# Patient Record
Sex: FEMALE | Race: WHITE | NOT HISPANIC OR LATINO | Employment: UNEMPLOYED | URBAN - NONMETROPOLITAN AREA
[De-identification: names, ages, dates, MRNs, and addresses within clinical notes are randomized per-mention and may not be internally consistent; named-entity substitution may affect disease eponyms.]

---
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
## 2021-01-12 ENCOUNTER — TRANSFERRED RECORDS (OUTPATIENT)
Dept: HEALTH INFORMATION MANAGEMENT | Facility: OTHER | Age: 21
End: 2021-01-12

## 2021-01-18 ENCOUNTER — MEDICAL CORRESPONDENCE (OUTPATIENT)
Dept: HEALTH INFORMATION MANAGEMENT | Facility: OTHER | Age: 21
End: 2021-01-18

## 2021-01-19 ENCOUNTER — TELEPHONE (OUTPATIENT)
Dept: OBGYN | Facility: OTHER | Age: 21
End: 2021-01-19

## 2021-01-19 DIAGNOSIS — O21.9 NAUSEA AND VOMITING DURING PREGNANCY: Primary | ICD-10-CM

## 2021-01-19 RX ORDER — PROMETHAZINE HYDROCHLORIDE 25 MG/1
25 TABLET ORAL EVERY 6 HOURS PRN
Qty: 30 TABLET | Refills: 0 | Status: SHIPPED | OUTPATIENT
Start: 2021-01-19 | End: 2021-08-11

## 2021-01-19 NOTE — TELEPHONE ENCOUNTER
Patient calls today stating she is pregnant and vomiting several times per day. She did see a provider in [place] who prescribed Unisom. Patient states it makes her too tired and does not help the nausea. Per standing order, patient was prescribed phenergan. She was advised to try this first in the evening as it may also make her tired. Patient will follow up with Intake visit on 1/22/21.    Analy Chan RN...................1/19/2021 3:12 PM

## 2021-01-19 NOTE — TELEPHONE ENCOUNTER
Clotilde is wondering if she can get anything for her morning sickness.  She has her phone intake visit scheduled for this Friday 1/22/21.  Caridad Wells on 1/19/2021 at 11:54 AM

## 2021-01-22 ENCOUNTER — VIRTUAL VISIT (OUTPATIENT)
Dept: OBGYN | Facility: OTHER | Age: 21
End: 2021-01-22
Attending: OBSTETRICS & GYNECOLOGY
Payer: COMMERCIAL

## 2021-01-22 VITALS — BODY MASS INDEX: 38.64 KG/M2 | HEIGHT: 62 IN | WEIGHT: 210 LBS

## 2021-01-22 DIAGNOSIS — O36.80X0 ENCOUNTER TO DETERMINE FETAL VIABILITY OF PREGNANCY, SINGLE OR UNSPECIFIED FETUS: Primary | ICD-10-CM

## 2021-01-22 PROCEDURE — 99207 PR OB VISIT-NO CHARGE - GICH ONLY: CPT | Mod: TEL

## 2021-01-22 ASSESSMENT — MIFFLIN-ST. JEOR: SCORE: 1680.8

## 2021-01-22 ASSESSMENT — PATIENT HEALTH QUESTIONNAIRE - PHQ9: SUM OF ALL RESPONSES TO PHQ QUESTIONS 1-9: 5

## 2021-01-22 ASSESSMENT — PAIN SCALES - GENERAL: PAINLEVEL: NO PAIN (0)

## 2021-01-22 NOTE — NURSING NOTE
"Chief Complaint   Patient presents with     Prenatal Care     Intake       Initial Ht 1.575 m (5' 2\")   Wt 95.3 kg (210 lb)   LMP 12/04/2020   BMI 38.41 kg/m   Estimated body mass index is 38.41 kg/m  as calculated from the following:    Height as of this encounter: 1.575 m (5' 2\").    Weight as of this encounter: 95.3 kg (210 lb).  Medication Reconciliation: complete    Verbal consent received for virtual intake visit.  Total duration of call 20 minutes.    Deb Whitten RN         "

## 2021-01-22 NOTE — PROGRESS NOTES
HPI:    This is a 20 year old female patient,  who was called today for OB Intake visit. Patient reports positive pregnancy test at home.     Obstetrical history and OB Demographics updated to the best of this nurse's ability based on patient report. PHQ-9 depression screening and routine Domestic Abuse screening completed. All immediate questions and concerns answered.    Last menstrual period is reported as Patient's last menstrual period was 2020. JASPREET based on LMP is 09/10/2021.  Her cycles are regular.  Her last menstrual period was normal.   Since her LMP, she has experienced  nausea, emesis, fatigue and headache.       Personal OB history includes: none  Previous OB Provider: leonela  Previous Delivering Clinic: na  Release of Records: na    Current delivery plan: GI  Preferred OB Provider: Claudia Cardenas MD  Current Primary Care Provider: Janki Muñoz  Pediatrician: none    Additional History: 2017 ovarian cyst rupture, MTHFR gene diagnosis     Have you travelled during the pregnancy?No  Have your sexual partner(s) travelled during the pregnancy?No      HISTORY:   Planned Pregnancy: No  Marital Status: Single  Occupation: currently unemployed  Living in Household: Significant Other    Father of the baby is involved.   Family and father of baby is supportive of current pregnancy.  Past Medical History of Father of Baby:No significant medical history    Past History:  Her past medical history History reviewed. No pertinent past medical history..      She has a history of      Since her last LMP she denies use of alcohol, tobacco and street drugs.    Pap smear history: NO - under age 21, PAP not appropriate for age    STD/STI history: No STD history    STD/STI symptoms: no noticeable symptoms     Past medical, surgical, social and family history were reviewed and updated in EPIC.    Medications reviewed by this nurse. Current medication list:  Current Outpatient Medications   Medication Sig  Dispense Refill     promethazine (PHENERGAN) 25 MG tablet Take 1 tablet (25 mg) by mouth every 6 hours as needed for nausea 30 tablet 0     albuterol (PROAIR HFA/PROVENTIL HFA/VENTOLIN HFA) 108 (90 Base) MCG/ACT inhaler Inhale 1-2 puffs into the lungs every 4 hours as needed for shortness of breath / dyspnea, wheezing or other (coughing spasms) (Patient not taking: Reported on 2021) 1 Inhaler 0     The following medications were recommended to be discontinued due to Pregnancy Category D status: none  Patient informed to contact her primary care provider as soon as possible to discuss a safer alternative.    Risk factors:  Moderate and moderately severe risks (consult with OB/Gyn)  Previous fetal or  demise: No  History of  delivery: No  History of heart disease Class I: No  Severe anemia, unresponsive to iron therapy: No  Pelvic mass or neoplasm: No  Previous : No  Hyper/hypothyroidism: No  History of postpartum hemorrhage requiring transfusion:No  History of Placenta Accreta: No    High Risk (Pregnancy managed by OB/Gyn)  Multiple pregnancy: No  Pre-gestational diabetes: No  Chronic Hypertension: No  Renal Failure: No  Heart disease, class II or greater: No  Rh Isoimmunization: No  Chronic active hepatitis: No  Convulsive disorder, poorly controlled: No  Isoimmune thrombocytopenia: No  Pre-term premature rupture of membranes: No  Lupus or other autoimmune disorder: Yes- 2017 diagnosis  Human Immunodeficiency Virus: No      ASSESSMENT/PLAN:     No diagnosis found.    20 year old , 7w0d of pregnancy with JASPREET of 9/10/2021, Alternate JASPREET Entry    Per standing orders and scope of practice of this nurse, patient will have the following orders placed and completed prior to initial OB visit with the appropriate provider:    --early ultrasound for dating and viability ordered for 6+ weeks gestation based on LMP    --Quantitative Beta HCG and progesterone monitoring if  indicated    Counseling given:     - Recommended weight gain for pregnancy: < 15 lbs.   BMI < 18.5  28-40 lbs   18.5 - 24.9 25-35   25 - 29.9 15-25   > 30  < 15       PLAN/PATIENT INSTRUCTIONS:    Normal exercise.  Normal sexual activity.  Prenatal vitamins.  Anticipated weight gain.    follow-up appointment with Dr. Claudia Cardenas MD for pre- care and take multivitamin or pre-keith vitamins    Deb Whitten RN.................................................. 2021 9:11 AM

## 2021-01-22 NOTE — PATIENT INSTRUCTIONS
Normal exercise.  Normal sexual activity.  Prenatal vitamins.  Anticipated weight gain.    follow-up appointment with Dr. Claudia Cardenas MD for pre- care and take multivitamin or pre- vitamins

## 2021-01-29 ENCOUNTER — PRENATAL OFFICE VISIT (OUTPATIENT)
Dept: OBGYN | Facility: OTHER | Age: 21
End: 2021-01-29
Attending: OBSTETRICS & GYNECOLOGY
Payer: COMMERCIAL

## 2021-01-29 ENCOUNTER — HOSPITAL ENCOUNTER (OUTPATIENT)
Dept: ULTRASOUND IMAGING | Facility: OTHER | Age: 21
End: 2021-01-29
Attending: OBSTETRICS & GYNECOLOGY
Payer: COMMERCIAL

## 2021-01-29 VITALS
BODY MASS INDEX: 34.6 KG/M2 | SYSTOLIC BLOOD PRESSURE: 122 MMHG | DIASTOLIC BLOOD PRESSURE: 74 MMHG | HEART RATE: 76 BPM | WEIGHT: 188 LBS | HEIGHT: 62 IN

## 2021-01-29 DIAGNOSIS — O36.80X0 ENCOUNTER TO DETERMINE FETAL VIABILITY OF PREGNANCY, SINGLE OR UNSPECIFIED FETUS: ICD-10-CM

## 2021-01-29 DIAGNOSIS — Z34.01 SUPERVISION OF NORMAL FIRST TEEN PREGNANCY IN FIRST TRIMESTER: Primary | ICD-10-CM

## 2021-01-29 LAB
ABO + RH BLD: NORMAL
ABO + RH BLD: NORMAL
BASOPHILS # BLD AUTO: 0 10E9/L (ref 0–0.2)
BASOPHILS NFR BLD AUTO: 0.4 %
BLD GP AB SCN SERPL QL: NORMAL
BLOOD BANK CMNT PATIENT-IMP: NORMAL
C TRACH DNA SPEC QL NAA+PROBE: NOT DETECTED
DIFFERENTIAL METHOD BLD: ABNORMAL
EOSINOPHIL # BLD AUTO: 0.1 10E9/L (ref 0–0.7)
EOSINOPHIL NFR BLD AUTO: 1 %
ERYTHROCYTE [DISTWIDTH] IN BLOOD BY AUTOMATED COUNT: 15.9 % (ref 10–15)
HCT VFR BLD AUTO: 36.7 % (ref 35–47)
HGB BLD-MCNC: 12 G/DL (ref 11.7–15.7)
IMM GRANULOCYTES # BLD: 0 10E9/L (ref 0–0.4)
IMM GRANULOCYTES NFR BLD: 0.4 %
LYMPHOCYTES # BLD AUTO: 1.2 10E9/L (ref 0.8–5.3)
LYMPHOCYTES NFR BLD AUTO: 11.8 %
MCH RBC QN AUTO: 26.1 PG (ref 26.5–33)
MCHC RBC AUTO-ENTMCNC: 32.7 G/DL (ref 31.5–36.5)
MCV RBC AUTO: 80 FL (ref 78–100)
MISCELLANEOUS TEST: NORMAL
MONOCYTES # BLD AUTO: 0.4 10E9/L (ref 0–1.3)
MONOCYTES NFR BLD AUTO: 4.3 %
N GONORRHOEA DNA SPEC QL NAA+PROBE: NOT DETECTED
NEUTROPHILS # BLD AUTO: 8.1 10E9/L (ref 1.6–8.3)
NEUTROPHILS NFR BLD AUTO: 82.1 %
PLATELET # BLD AUTO: 331 10E9/L (ref 150–450)
RBC # BLD AUTO: 4.6 10E12/L (ref 3.8–5.2)
SPECIMEN EXP DATE BLD: NORMAL
SPECIMEN SOURCE: NORMAL
WBC # BLD AUTO: 9.8 10E9/L (ref 4–11)

## 2021-01-29 PROCEDURE — 81329 SMN1 GENE DOS/DELETION ALYS: CPT | Mod: ZL | Performed by: OBSTETRICS & GYNECOLOGY

## 2021-01-29 PROCEDURE — 86850 RBC ANTIBODY SCREEN: CPT | Mod: ZL | Performed by: OBSTETRICS & GYNECOLOGY

## 2021-01-29 PROCEDURE — 86762 RUBELLA ANTIBODY: CPT | Mod: ZL | Performed by: OBSTETRICS & GYNECOLOGY

## 2021-01-29 PROCEDURE — 86780 TREPONEMA PALLIDUM: CPT | Mod: ZL | Performed by: OBSTETRICS & GYNECOLOGY

## 2021-01-29 PROCEDURE — 86901 BLOOD TYPING SEROLOGIC RH(D): CPT | Mod: ZL | Performed by: OBSTETRICS & GYNECOLOGY

## 2021-01-29 PROCEDURE — 81220 CFTR GENE COM VARIANTS: CPT | Performed by: OBSTETRICS & GYNECOLOGY

## 2021-01-29 PROCEDURE — 87491 CHLMYD TRACH DNA AMP PROBE: CPT | Mod: ZL,XU | Performed by: OBSTETRICS & GYNECOLOGY

## 2021-01-29 PROCEDURE — 87340 HEPATITIS B SURFACE AG IA: CPT | Mod: ZL | Performed by: OBSTETRICS & GYNECOLOGY

## 2021-01-29 PROCEDURE — 84999 UNLISTED CHEMISTRY PROCEDURE: CPT | Mod: ZL | Performed by: OBSTETRICS & GYNECOLOGY

## 2021-01-29 PROCEDURE — 36415 COLL VENOUS BLD VENIPUNCTURE: CPT | Mod: ZL | Performed by: OBSTETRICS & GYNECOLOGY

## 2021-01-29 PROCEDURE — 85025 COMPLETE CBC W/AUTO DIFF WBC: CPT | Mod: ZL | Performed by: OBSTETRICS & GYNECOLOGY

## 2021-01-29 PROCEDURE — 87389 HIV-1 AG W/HIV-1&-2 AB AG IA: CPT | Mod: ZL | Performed by: OBSTETRICS & GYNECOLOGY

## 2021-01-29 PROCEDURE — 86900 BLOOD TYPING SEROLOGIC ABO: CPT | Mod: ZL | Performed by: OBSTETRICS & GYNECOLOGY

## 2021-01-29 PROCEDURE — 87591 N.GONORRHOEAE DNA AMP PROB: CPT | Mod: ZL,XU | Performed by: OBSTETRICS & GYNECOLOGY

## 2021-01-29 PROCEDURE — 99207 PR OB VISIT-NO CHARGE - GICH ONLY: CPT | Performed by: OBSTETRICS & GYNECOLOGY

## 2021-01-29 PROCEDURE — 87086 URINE CULTURE/COLONY COUNT: CPT | Mod: ZL | Performed by: OBSTETRICS & GYNECOLOGY

## 2021-01-29 PROCEDURE — 76801 OB US < 14 WKS SINGLE FETUS: CPT

## 2021-01-29 ASSESSMENT — MIFFLIN-ST. JEOR: SCORE: 1581.01

## 2021-01-29 ASSESSMENT — PAIN SCALES - GENERAL: PAINLEVEL: NO PAIN (0)

## 2021-01-29 NOTE — NURSING NOTE
Chief Complaint   Patient presents with     Prenatal Care     8w0d     Offers no complaints   Huong Davis LPN........................1/29/2021  11:08 AM     Medication Reconciliation: completed   Huong Davis LPN  1/29/2021 11:08 AM

## 2021-01-29 NOTE — PROGRESS NOTES
New Obstetrics Visit    HPI: 20 year old  at 8w0d by LMP c/w 8w0d US here today for initial OB visit. Her LMP was 20. Was having regular monthly menses. This was a unplanned pregnancy, but welcome. Wasn't using contraception. Has mild nausea and occasional vomiting. Mild cramping. No VB. Has hx of depression- stopped bupropion and fluoxetine with +UPT and reports mood is good    OBHx  OB History    Para Term  AB Living   1 0 0 0 0 0   SAB TAB Ectopic Multiple Live Births   0 0 0 0 0      # Outcome Date GA Lbr Jerome/2nd Weight Sex Delivery Anes PTL Lv   1 Current                PMHx:   Past Medical History:   Diagnosis Date     MTHFR gene mutation (H)       PSHx:   Past Surgical History:   Procedure Laterality Date     wisdom teeth        Meds:   Current Outpatient Medications   Medication     promethazine (PHENERGAN) 25 MG tablet     albuterol (PROAIR HFA/PROVENTIL HFA/VENTOLIN HFA) 108 (90 Base) MCG/ACT inhaler     No current facility-administered medications for this visit.      Allergies:     Allergies   Allergen Reactions     Tramadol Other (See Comments)     Ankle swelling and pain       SocHx:   Social History     Tobacco Use     Smoking status: Never Smoker     Smokeless tobacco: Never Used   Substance Use Topics     Alcohol use: Yes     Frequency: Never     Comment: occ     Drug use: Not Currently     Types: Marijuana     Comment: Has not used marijuana during pregnancy     Lives with Alec, together x3 years. Not working or in school.    FamHx:   Family History   Problem Relation Age of Onset     Genetic Disorder Mother         MTHFR mutation     Pulmonary Embolism Mother         2018, no issues during pregnancy     Thyroid Cancer Father      Heart Disease Maternal Grandmother      Diabetes Maternal Grandfather         ROS: 10-Point ROS negative except as noted in HPI      Physical Exam  /74 (BP Location: Right arm, Patient Position: Sitting, Cuff Size: Adult Large)   Pulse 76   " Ht 1.575 m (5' 2\")   Wt 85.3 kg (188 lb)   LMP 2020   Breastfeeding No   BMI 34.39 kg/m    Body mass index is 34.39 kg/m .  Gen: Well-appearing, NAD  HEENT: Normocephalic, atraumatic  Neck: Thyroid is not enlarged, no appreciable masses palpated. Non-tender  CV:  RRR, no m/r/g auscultated  Pulm: CTAB, no w/r/r auscultated  Abd: Soft, non-tender, non-distended  Ext: No LE edema, extremities warm and well perfused    Pelvic:  Normal appearing external female genitalia. No vaginal lesions. Moderate white discharge. Cervix normal, no lesions. Uterus is 9 wk size, mobile, non-tender, anteverted. No adnexal tenderness or masses    Assessment/Plan:  Ms. Clotilde Dorantes is a 20 year old  at 8w0d by LMP c/w 8w0d uS, here for new OB visit. Pregnancy is complicated by MTHFR gene mutation, depression.  1. MTHFR mutation: no changes to folic acid recommendations. Discussed this is controversially associated with VTE- her bigger risk for VTE is her family history, although mother did well during pregnancy. She does not need anticoagulation during pregnancy or postpartum for ppx.  2. Depression: no meds, currently stable. Continue to monitor. Plan restart postpartum or sooner prn  3. New OB labs ord'd  4. Imaging: dating US at 8w0d  5. Genetics: desires- CF and SMA 2021. Plan [place] next visit  6. Immunizations: none    Follow up in 4 weeks.    Claudia Cardenas MD  OB/GYN  2021 11:45 AM     "

## 2021-01-30 LAB
BACTERIA SPEC CULT: NORMAL
SPECIMEN SOURCE: NORMAL
T PALLIDUM AB SER QL: NONREACTIVE

## 2021-01-31 LAB
HBV SURFACE AG SERPL QL IA: NONREACTIVE
HIV 1+2 AB+HIV1 P24 AG SERPL QL IA: NONREACTIVE

## 2021-02-01 LAB — RUBV IGG SERPL IA-ACNC: 74 IU/ML

## 2021-02-09 LAB
CFTR ALLELE 2 BLD/T QL: NEGATIVE
CFTR P.R117H+5T VAR BLD/T QL: NORMAL
CYSTIC FIBROSIS 165 VARIANT INTERP: NORMAL

## 2021-02-15 LAB
RESULT: NORMAL
SEND OUTS MISC TEST CODE: NORMAL
SEND OUTS MISC TEST SPECIMEN: NORMAL
TEST NAME: NORMAL

## 2021-03-02 ENCOUNTER — PRENATAL OFFICE VISIT (OUTPATIENT)
Dept: OBGYN | Facility: OTHER | Age: 21
End: 2021-03-02
Attending: OBSTETRICS & GYNECOLOGY
Payer: COMMERCIAL

## 2021-03-02 VITALS
DIASTOLIC BLOOD PRESSURE: 74 MMHG | WEIGHT: 186 LBS | HEART RATE: 80 BPM | SYSTOLIC BLOOD PRESSURE: 122 MMHG | BODY MASS INDEX: 34.02 KG/M2

## 2021-03-02 DIAGNOSIS — Z34.01 SUPERVISION OF NORMAL FIRST TEEN PREGNANCY IN FIRST TRIMESTER: Primary | ICD-10-CM

## 2021-03-02 PROCEDURE — 99207 PR OB VISIT-NO CHARGE - GICH ONLY: CPT | Performed by: OBSTETRICS & GYNECOLOGY

## 2021-03-02 RX ORDER — PRENATAL VIT/IRON FUM/FOLIC AC 27MG-0.8MG
1 TABLET ORAL DAILY
COMMUNITY
End: 2022-04-27

## 2021-03-02 ASSESSMENT — PAIN SCALES - GENERAL: PAINLEVEL: NO PAIN (0)
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
## 2021-03-11 ENCOUNTER — TELEPHONE (OUTPATIENT)
Dept: OBGYN | Facility: OTHER | Age: 21
End: 2021-03-11

## 2021-03-11 NOTE — TELEPHONE ENCOUNTER
Patient called and informed of normal [facility] results and she wanted to know sex of baby (Male) and requested info to be left with unit 5 check in for FOB. No further questions or concerns at this time.  Fadumo Cardenas RN on 3/11/2021 at 3:25 PM

## 2021-03-30 ENCOUNTER — PRENATAL OFFICE VISIT (OUTPATIENT)
Dept: OBGYN | Facility: OTHER | Age: 21
End: 2021-03-30
Attending: OBSTETRICS & GYNECOLOGY
Payer: COMMERCIAL

## 2021-03-30 VITALS
WEIGHT: 190 LBS | HEART RATE: 80 BPM | DIASTOLIC BLOOD PRESSURE: 60 MMHG | SYSTOLIC BLOOD PRESSURE: 124 MMHG | BODY MASS INDEX: 34.75 KG/M2

## 2021-03-30 DIAGNOSIS — Z34.02 SUPERVISION OF NORMAL FIRST TEEN PREGNANCY IN SECOND TRIMESTER: Primary | ICD-10-CM

## 2021-03-30 PROCEDURE — 99207 PR OB VISIT-NO CHARGE - GICH ONLY: CPT | Performed by: OBSTETRICS & GYNECOLOGY

## 2021-03-30 SDOH — HEALTH STABILITY: MENTAL HEALTH: HOW OFTEN DO YOU HAVE 6 OR MORE DRINKS ON ONE OCCASION?: NOT ASKED

## 2021-03-30 SDOH — HEALTH STABILITY: MENTAL HEALTH: HOW MANY STANDARD DRINKS CONTAINING ALCOHOL DO YOU HAVE ON A TYPICAL DAY?: NOT ASKED

## 2021-03-30 ASSESSMENT — PAIN SCALES - GENERAL: PAINLEVEL: NO PAIN (0)

## 2021-03-30 NOTE — PROGRESS NOTES
Return OB Visit    S: Patient is feeling well. Still tired. Occasional mild cramping. No VB.    O: /60 (BP Location: Right arm, Patient Position: Sitting, Cuff Size: Adult Large)   Pulse 80   Wt 86.2 kg (190 lb)   LMP 2020   Breastfeeding No   BMI 34.75 kg/m    Gen: Well-appearing, NAD  See OB Flowsheet    A/P:  Clotilde Dorantes is a 20 year old  at 16w4d by LMP c/w 8w0d US, here for return OB visit.  MTHFR mutation: does not need anticoagulation during pregnancy or postpartum.   Depression: no meds, currently stable. Plan to restart PP or sooner prn     PNC: Rh positive, Rubella immune  Genetics: normal CF and SMA screen. [employer] 3/2/2021   Imaging: dating US at 8w0d  Immunizations: none  RTC 4 weeks with US    Claudia Cardenas MD  OB/GYN  3/30/2021 9:53 AM

## 2021-03-30 NOTE — NURSING NOTE
Chief Complaint   Patient presents with     Prenatal Care     16w4d     Offers no complaints  Huong Davis LPN........................3/30/2021  9:41 AM     Medication Reconciliation: completed   Huong Davis LPN  3/30/2021 9:41 AM

## 2021-04-15 ENCOUNTER — TELEPHONE (OUTPATIENT)
Dept: EMERGENCY MEDICINE | Facility: OTHER | Age: 21
End: 2021-04-15

## 2021-04-15 ENCOUNTER — HOSPITAL ENCOUNTER (EMERGENCY)
Facility: OTHER | Age: 21
Discharge: HOME | End: 2021-04-15
Attending: EMERGENCY MEDICINE | Admitting: EMERGENCY MEDICINE
Payer: COMMERCIAL

## 2021-04-15 ENCOUNTER — ANCILLARY PROCEDURE (OUTPATIENT)
Dept: ULTRASOUND IMAGING | Facility: OTHER | Age: 21
End: 2021-04-15
Attending: EMERGENCY MEDICINE
Payer: COMMERCIAL

## 2021-04-15 VITALS
TEMPERATURE: 98.2 F | BODY MASS INDEX: 34.96 KG/M2 | DIASTOLIC BLOOD PRESSURE: 68 MMHG | HEART RATE: 78 BPM | SYSTOLIC BLOOD PRESSURE: 111 MMHG | RESPIRATION RATE: 20 BRPM | WEIGHT: 190 LBS | OXYGEN SATURATION: 98 % | HEIGHT: 62 IN

## 2021-04-15 DIAGNOSIS — R10.9 ABDOMINAL CRAMPING AFFECTING PREGNANCY: ICD-10-CM

## 2021-04-15 DIAGNOSIS — O26.899 ABDOMINAL CRAMPING AFFECTING PREGNANCY: ICD-10-CM

## 2021-04-15 DIAGNOSIS — O23.599 BACTERIAL VAGINOSIS IN PREGNANCY: Primary | ICD-10-CM

## 2021-04-15 DIAGNOSIS — B96.89 BACTERIAL VAGINOSIS IN PREGNANCY: Primary | ICD-10-CM

## 2021-04-15 DIAGNOSIS — B37.31 CANDIDIASIS OF VAGINA DURING PREGNANCY: ICD-10-CM

## 2021-04-15 DIAGNOSIS — O98.819 CANDIDIASIS OF VAGINA DURING PREGNANCY: ICD-10-CM

## 2021-04-15 LAB
ALBUMIN UR-MCNC: NEGATIVE MG/DL
APPEARANCE UR: ABNORMAL
BACTERIA #/AREA URNS HPF: ABNORMAL /HPF
BILIRUB UR QL STRIP: NEGATIVE
COLOR UR AUTO: ABNORMAL
GLUCOSE UR STRIP-MCNC: NEGATIVE MG/DL
HGB UR QL STRIP: NEGATIVE
KETONES UR STRIP-MCNC: NEGATIVE MG/DL
LEUKOCYTE ESTERASE UR QL STRIP: ABNORMAL
NITRATE UR QL: NEGATIVE
PH UR STRIP: 7.5 PH (ref 5–7)
RBC #/AREA URNS AUTO: 2 /HPF (ref 0–2)
SOURCE: ABNORMAL
SP GR UR STRIP: 1.01 (ref 1–1.03)
SQUAMOUS #/AREA URNS AUTO: 14 /HPF (ref 0–1)
UROBILINOGEN UR STRIP-MCNC: NORMAL MG/DL (ref 0–2)
WBC #/AREA URNS AUTO: 2 /HPF (ref 0–5)

## 2021-04-15 PROCEDURE — 99282 EMERGENCY DEPT VISIT SF MDM: CPT | Performed by: EMERGENCY MEDICINE

## 2021-04-15 PROCEDURE — 76815 OB US LIMITED FETUS(S): CPT | Mod: TC | Performed by: EMERGENCY MEDICINE

## 2021-04-15 PROCEDURE — 99282 EMERGENCY DEPT VISIT SF MDM: CPT | Mod: 25 | Performed by: EMERGENCY MEDICINE

## 2021-04-15 PROCEDURE — 99283 EMERGENCY DEPT VISIT LOW MDM: CPT | Performed by: EMERGENCY MEDICINE

## 2021-04-15 PROCEDURE — 76815 OB US LIMITED FETUS(S): CPT | Mod: 26 | Performed by: EMERGENCY MEDICINE

## 2021-04-15 PROCEDURE — 87086 URINE CULTURE/COLONY COUNT: CPT | Mod: ZL | Performed by: EMERGENCY MEDICINE

## 2021-04-15 PROCEDURE — 81001 URINALYSIS AUTO W/SCOPE: CPT | Mod: ZL | Performed by: EMERGENCY MEDICINE

## 2021-04-15 ASSESSMENT — MIFFLIN-ST. JEOR: SCORE: 1590.08

## 2021-04-15 NOTE — ED PROVIDER NOTES
"Clotilde Dorantes  : 2000 Age: 20 year old Sex: female MRN: [id number]    CC: Abd pain in pregnancy    HPI: Clotilde Dorantes is a 20 year old  at 18w6d by 8w0d US who presents with cramping abdominal pain. She states that she has intermittently had cramping abdominal pain throughout the pregnancy, but since yesterday evening the pain has been worse than it was previously. She endorses cramping feelings every few hours which are sufficient to wake her up. No gush of fluid or vaginal bleeding. No Fevers. No N/V/D. No dysuria/freq/urg.     ED Course and MDM:  Abd pain in preg: [place] leach vs UTI vs round ligament. Bedside US demonstrated normal fetal movement and heartrate.  I initially neglected to obtain a urinalysis, but called the patient and placed an order with a lab for urinalysis.  Her urinalysis to me appears to be a contaminated specimen.  Rather than treating her for potential asymptomatic bacteriuria of pregnancy, I would like to wait on the culture results for this.  With a reassuring ultrasound, patient was discharged home in stable condition with return precautions and follow-up with her obstetrician    Final Clinical Impression:  Abdominal pain in pregnancy    Amor Menard MD  Internal Medicine and Emergency Medicine  9:07 AM 04/15/21      Physical Exam:  /76   Pulse 84   Temp 98.2  F (36.8  C) (Tympanic)   Resp 20   Ht 1.575 m (5' 2\")   Wt 86.2 kg (190 lb)   LMP 2020   SpO2 98%   BMI 34.75 kg/m      Abd Soft, mildly tender throughout without rebound or guarding    ROS:  Focused ROS performed and noted in HPI           Amor Menard MD  04/15/21 1705    "

## 2021-04-15 NOTE — ED TRIAGE NOTES
ED Nursing Triage Note (General)   ________________________________    Clotilde JUSTIN Dorantes is a 20 year old Female that presents to triage private car with a chief complaint of abdominal cramping. It started last night and worsen throughout the night to the point of waking her up. Pt will be 19 weeks pregnant tomorrow. Pt says cramps get up to 8/10 when they present. Pt denies any bleeding. Pt denies feeling nauseated.     LMP 12/04/2020 t  Patient appears alert  and oriented, in no acute distress., and cooperative, pleasant and calm behavior.  GCS Total = 15  Airway: intact  Breathing noted as Normal.  Circulation Normal  Skin normal  Action taken:  Triage to critical care immediately      PRE HOSPITAL PRIOR LIVING SITUATION Significant Other

## 2021-04-15 NOTE — DISCHARGE INSTRUCTIONS
You were seen today with cramping abdominal pain in pregnancy.  At this time there are no signs of urine premature labor.  The baby looks good on ultrasound.  If you have any new symptoms that are concerning you, please come back to be seen.  Please follow-up with your obstetrician at your scheduled appointment.  Please take Tylenol for your pain and stay well-hydrated

## 2021-04-16 LAB
BACTERIA SPEC CULT: NORMAL
SPECIMEN SOURCE: NORMAL

## 2021-04-26 NOTE — PROGRESS NOTES
Return OB Visit    S: Ms. Dorantes is feeling well today. She has no acute concerns. Denies leaking of fluid, vaginal bleeding, painful contractions. Notes fetal movements. She had her anatomy US today    O:  /68   Pulse 104   Wt 89.9 kg (198 lb 4.8 oz)   LMP 2020   BMI 36.27 kg/m      Gen: Well-appearing, NAD  See OB Flowsheet    FH 20 cm   bpm    Assessment:  Ms. Clotilde Dorantes is a 20 year old yo  here for an OB follow up visit. She is currently 20w4d. This pregnancy is complicated by MTHFR mutation and depression.    Plan:  # Routine Prenatal Care  -- Dating: LMP= 8w US JASPREET: 9/10/2021  -- PNLs:    A+, Ab screen neg   RPR nr   Hep B S Ag neg   Rubella Immune   HIV neg   GC/Chlam neg    -- Genetic Screening: [place] low risk: male, SMA low risk, CF low risk  -- Anatomy US: performed today, will follow up results  -- Immunizations: Plans for Tdap at approximately 27 weeks gestation  -- 3rd TM labs including CBC, RPR: Planned for after 28 weeks gestation  -- 1 hr GTT: next visit  -- GBS: Planned for 36 weeks  -- Postpartum Planning: To be discussed   -- Delivery Planning: No indication for early IOL at this time. To be discussed continually  -- Return to clinic in 4 weeks for OB follow up visit  -- Planning to do at next visit: 1 hr GTT    # MTHFR mutation  -- no anticoagulation needed in pregnancy or postpartum period    # Depression  -- currently stable  -- plan for resuming medications postpartum

## 2021-04-27 ENCOUNTER — HOSPITAL ENCOUNTER (OUTPATIENT)
Dept: ULTRASOUND IMAGING | Facility: OTHER | Age: 21
End: 2021-04-27
Attending: OBSTETRICS & GYNECOLOGY
Payer: COMMERCIAL

## 2021-04-27 ENCOUNTER — PRENATAL OFFICE VISIT (OUTPATIENT)
Dept: OBGYN | Facility: OTHER | Age: 21
End: 2021-04-27
Attending: STUDENT IN AN ORGANIZED HEALTH CARE EDUCATION/TRAINING PROGRAM
Payer: COMMERCIAL

## 2021-04-27 VITALS
SYSTOLIC BLOOD PRESSURE: 102 MMHG | BODY MASS INDEX: 36.27 KG/M2 | HEART RATE: 104 BPM | WEIGHT: 198.3 LBS | DIASTOLIC BLOOD PRESSURE: 68 MMHG

## 2021-04-27 DIAGNOSIS — Z15.89 MTHFR MUTATION: ICD-10-CM

## 2021-04-27 DIAGNOSIS — Z34.02 SUPERVISION OF NORMAL FIRST TEEN PREGNANCY IN SECOND TRIMESTER: Primary | ICD-10-CM

## 2021-04-27 DIAGNOSIS — Z34.02 SUPERVISION OF NORMAL FIRST TEEN PREGNANCY IN SECOND TRIMESTER: ICD-10-CM

## 2021-04-27 PROCEDURE — 76805 OB US >/= 14 WKS SNGL FETUS: CPT

## 2021-04-27 PROCEDURE — 99207 PR OB VISIT-NO CHARGE - GICH ONLY: CPT | Performed by: STUDENT IN AN ORGANIZED HEALTH CARE EDUCATION/TRAINING PROGRAM

## 2021-04-27 ASSESSMENT — PAIN SCALES - GENERAL: PAINLEVEL: NO PAIN (0)

## 2021-04-27 NOTE — NURSING NOTE
Pt presents to clinic today for prenatal care 20w4d.Pt denies any contractions, bleeding, or leakage of fluid at this time.      Medication Reconciliation: complete  Alayna Crump LPN

## 2021-05-11 ENCOUNTER — HOSPITAL ENCOUNTER (OUTPATIENT)
Facility: OTHER | Age: 21
End: 2021-05-11
Admitting: STUDENT IN AN ORGANIZED HEALTH CARE EDUCATION/TRAINING PROGRAM
Payer: COMMERCIAL

## 2021-05-11 ENCOUNTER — NURSE TRIAGE (OUTPATIENT)
Dept: NURSING | Facility: CLINIC | Age: 21
End: 2021-05-11

## 2021-05-11 ENCOUNTER — HOSPITAL ENCOUNTER (OUTPATIENT)
Facility: OTHER | Age: 21
Discharge: HOME | End: 2021-05-11
Attending: STUDENT IN AN ORGANIZED HEALTH CARE EDUCATION/TRAINING PROGRAM | Admitting: STUDENT IN AN ORGANIZED HEALTH CARE EDUCATION/TRAINING PROGRAM
Payer: COMMERCIAL

## 2021-05-11 VITALS
OXYGEN SATURATION: 96 % | RESPIRATION RATE: 18 BRPM | TEMPERATURE: 98.6 F | SYSTOLIC BLOOD PRESSURE: 127 MMHG | HEART RATE: 88 BPM | DIASTOLIC BLOOD PRESSURE: 68 MMHG

## 2021-05-11 DIAGNOSIS — Z34.02 SUPERVISION OF NORMAL FIRST TEEN PREGNANCY IN SECOND TRIMESTER: Primary | ICD-10-CM

## 2021-05-11 PROBLEM — Z36.89 ENCOUNTER FOR TRIAGE IN PREGNANT PATIENT: Status: ACTIVE | Noted: 2021-05-11

## 2021-05-11 LAB
ALBUMIN UR-MCNC: NEGATIVE MG/DL
AMORPH CRY #/AREA URNS HPF: ABNORMAL /HPF
APPEARANCE UR: CLEAR
BACTERIA #/AREA URNS HPF: ABNORMAL /HPF
BILIRUB UR QL STRIP: NEGATIVE
COLOR UR AUTO: ABNORMAL
GLUCOSE UR STRIP-MCNC: NEGATIVE MG/DL
HGB UR QL STRIP: NEGATIVE
KETONES UR STRIP-MCNC: NEGATIVE MG/DL
LEUKOCYTE ESTERASE UR QL STRIP: ABNORMAL
NITRATE UR QL: NEGATIVE
PH UR STRIP: 7 PH (ref 5–7)
RBC #/AREA URNS AUTO: 1 /HPF (ref 0–2)
SOURCE: ABNORMAL
SP GR UR STRIP: 1.01 (ref 1–1.03)
SPECIMEN SOURCE: ABNORMAL
SQUAMOUS #/AREA URNS AUTO: 5 /HPF (ref 0–1)
UROBILINOGEN UR STRIP-MCNC: NORMAL MG/DL (ref 0–2)
WBC #/AREA URNS AUTO: 6 /HPF (ref 0–5)
WET PREP SPEC: ABNORMAL

## 2021-05-11 PROCEDURE — G0463 HOSPITAL OUTPT CLINIC VISIT: HCPCS | Mod: 25

## 2021-05-11 PROCEDURE — 81001 URINALYSIS AUTO W/SCOPE: CPT | Performed by: STUDENT IN AN ORGANIZED HEALTH CARE EDUCATION/TRAINING PROGRAM

## 2021-05-11 PROCEDURE — 87210 SMEAR WET MOUNT SALINE/INK: CPT | Performed by: STUDENT IN AN ORGANIZED HEALTH CARE EDUCATION/TRAINING PROGRAM

## 2021-05-11 PROCEDURE — 250N000013 HC RX MED GY IP 250 OP 250 PS 637: Performed by: STUDENT IN AN ORGANIZED HEALTH CARE EDUCATION/TRAINING PROGRAM

## 2021-05-11 RX ORDER — METRONIDAZOLE 500 MG/1
500 TABLET ORAL 2 TIMES DAILY
Qty: 14 TABLET | Refills: 0 | Status: SHIPPED | OUTPATIENT
Start: 2021-05-11 | End: 2021-05-18

## 2021-05-11 RX ORDER — ACETAMINOPHEN 325 MG/1
975 TABLET ORAL ONCE
Status: COMPLETED | OUTPATIENT
Start: 2021-05-11 | End: 2021-05-11

## 2021-05-11 RX ORDER — CLOTRIMAZOLE 1 %
CREAM (GRAM) TOPICAL DAILY
Qty: 15 G | Refills: 0 | Status: SHIPPED | OUTPATIENT
Start: 2021-05-11 | End: 2021-05-18

## 2021-05-11 RX ORDER — CLOTRIMAZOLE 1 %
CREAM (GRAM) TOPICAL DAILY
Qty: 15 G | Refills: 0 | Status: SHIPPED | OUTPATIENT
Start: 2021-05-11 | End: 2021-06-24

## 2021-05-11 RX ADMIN — ACETAMINOPHEN 975 MG: 325 TABLET, FILM COATED ORAL at 20:14

## 2021-05-11 NOTE — TELEPHONE ENCOUNTER
Patient calling reporting she is 22w4d pregnant. Reports she has been having contractions. Denies feeling like she is going to pass out. RN artemio transferred patient to [facility]  to be triaged since FNA do not triage OB patient for [facility].     Mandeep Bay RN  [facility]

## 2021-05-12 NOTE — PROGRESS NOTES
Lab results called to MD. Patient will be treated for BV and yeast infection. Medications sent to [facility] pharmacy and patient will  tonight to start treatment. Discharged to home with instructions to follow up as previously scheduled in clinic. No further questions or concerns.

## 2021-05-12 NOTE — PROGRESS NOTES
Patient arrived from home with c/o cramping that started yesterday, reports baby has been active, denies vaginal bleeding or leaking of fluid of discharge. , 29 4/7 weeks. No recent intercourse. Wet prep and UA sent to lab. SVE: 0 cm, 0%, -2. FHR: 145 bpm, no contractions tracing or palpated. MD aware of patient, orders placed.

## 2021-05-26 ENCOUNTER — TELEPHONE (OUTPATIENT)
Dept: OBGYN | Facility: OTHER | Age: 21
End: 2021-05-26

## 2021-05-26 NOTE — PROGRESS NOTES
Return OB Visit    S: Ms. Dorantes is feeling well today. She has no acute concerns. Denies leaking of fluid, vaginal bleeding, painful contractions. Notes fetal movements.    O:   /80   Pulse 108   Wt 93.9 kg (207 lb)   LMP 2020   BMI 37.86 kg/m      Gen: Well-appearing, NAD  See OB Flowsheet    FH 25 cm   bpm    Assessment:  Ms. Clotilde Dorantes is a 20 year old yo  here for an OB follow up visit. She is currently 24w6d. This pregnancy is complicated by MTHFR mutation and depression.     Plan:  # Routine Prenatal Care  -- Dating: LMP= 8w US JASPREET: 9/10/2021  -- PNLs:               A+, Ab screen neg              RPR nr              Hep B S Ag neg              Rubella Immune              HIV neg              GC/Chlam neg     -- Genetic Screening: [place] low risk: male, SMA low risk, CF low risk  -- Anatomy US: : 50%ile, unable to see profile and RVOT   Follow up US ordered today  -- Immunizations: Plans for Tdap at next visit  -- 3rd TM labs including CBC, RPR: Planned for next visit  -- 1 hr GTT: today  -- GBS: Planned for 36 weeks  -- Postpartum Planning: To be discussed   -- Delivery Planning: No indication for early IOL at this time. To be discussed continually  -- Return to clinic in 4 weeks for OB follow up visit  -- Planning to do at next visit: CBC, RPR, Tdap     # MTHFR mutation  -- no anticoagulation needed in pregnancy or postpartum period     # Depression  -- currently stable  -- plan for resuming medications postpartum    JOSE JASSO MD on 2021 at 11:39 AM

## 2021-05-26 NOTE — TELEPHONE ENCOUNTER
Call from patient stating she is having some cramping lower abdomen, no bleeding or leaking fluid, no fever, baby is active as usual, no known urinary issues, no headache and swelling of the hands/feet. Advised patient to increase her fluid intake, take some tylenol and reviewed round ligament pain. Patient has appointment with Dr. Gutierrez tomorrow and does not request to be seen today. Advised her to call back if anything changes yet today. Patient voiced understanding and has no further questions or concerns at this time.   Fadumo Cardenas RN on 5/26/2021 at 11:45 AM

## 2021-05-27 ENCOUNTER — PRENATAL OFFICE VISIT (OUTPATIENT)
Dept: OBGYN | Facility: OTHER | Age: 21
End: 2021-05-27
Attending: STUDENT IN AN ORGANIZED HEALTH CARE EDUCATION/TRAINING PROGRAM
Payer: COMMERCIAL

## 2021-05-27 ENCOUNTER — HOSPITAL ENCOUNTER (OUTPATIENT)
Facility: OTHER | Age: 21
End: 2021-05-27
Admitting: STUDENT IN AN ORGANIZED HEALTH CARE EDUCATION/TRAINING PROGRAM
Payer: COMMERCIAL

## 2021-05-27 ENCOUNTER — HOSPITAL ENCOUNTER (OUTPATIENT)
Facility: OTHER | Age: 21
Discharge: HOME | End: 2021-05-27
Attending: STUDENT IN AN ORGANIZED HEALTH CARE EDUCATION/TRAINING PROGRAM | Admitting: STUDENT IN AN ORGANIZED HEALTH CARE EDUCATION/TRAINING PROGRAM
Payer: COMMERCIAL

## 2021-05-27 ENCOUNTER — NURSE TRIAGE (OUTPATIENT)
Dept: NURSING | Facility: CLINIC | Age: 21
End: 2021-05-27

## 2021-05-27 VITALS
TEMPERATURE: 98.2 F | HEART RATE: 82 BPM | RESPIRATION RATE: 18 BRPM | DIASTOLIC BLOOD PRESSURE: 60 MMHG | SYSTOLIC BLOOD PRESSURE: 131 MMHG

## 2021-05-27 VITALS
SYSTOLIC BLOOD PRESSURE: 124 MMHG | HEART RATE: 108 BPM | WEIGHT: 207 LBS | DIASTOLIC BLOOD PRESSURE: 80 MMHG | BODY MASS INDEX: 37.86 KG/M2

## 2021-05-27 DIAGNOSIS — Z34.02 SUPERVISION OF NORMAL FIRST TEEN PREGNANCY IN SECOND TRIMESTER: Primary | ICD-10-CM

## 2021-05-27 DIAGNOSIS — Z15.89 MTHFR MUTATION: ICD-10-CM

## 2021-05-27 LAB
ALBUMIN UR-MCNC: NEGATIVE MG/DL
APPEARANCE UR: ABNORMAL
BACTERIA #/AREA URNS HPF: ABNORMAL /HPF
BILIRUB UR QL STRIP: NEGATIVE
COLOR UR AUTO: ABNORMAL
GLUCOSE 1H P 50 G GLC PO SERPL-MCNC: 132 MG/DL (ref 60–129)
GLUCOSE UR STRIP-MCNC: NEGATIVE MG/DL
HGB UR QL STRIP: NEGATIVE
KETONES UR STRIP-MCNC: NEGATIVE MG/DL
LEUKOCYTE ESTERASE UR QL STRIP: ABNORMAL
MUCOUS THREADS #/AREA URNS LPF: PRESENT /LPF
NITRATE UR QL: NEGATIVE
PH UR STRIP: 7.5 PH (ref 5–7)
RBC #/AREA URNS AUTO: 0 /HPF (ref 0–2)
SOURCE: ABNORMAL
SP GR UR STRIP: 1.02 (ref 1–1.03)
SQUAMOUS #/AREA URNS AUTO: 2 /HPF (ref 0–1)
UROBILINOGEN UR STRIP-MCNC: NORMAL MG/DL (ref 0–2)
WBC #/AREA URNS AUTO: 2 /HPF (ref 0–5)

## 2021-05-27 PROCEDURE — 81001 URINALYSIS AUTO W/SCOPE: CPT | Performed by: STUDENT IN AN ORGANIZED HEALTH CARE EDUCATION/TRAINING PROGRAM

## 2021-05-27 PROCEDURE — 82950 GLUCOSE TEST: CPT | Mod: ZL | Performed by: STUDENT IN AN ORGANIZED HEALTH CARE EDUCATION/TRAINING PROGRAM

## 2021-05-27 PROCEDURE — 87086 URINE CULTURE/COLONY COUNT: CPT | Performed by: STUDENT IN AN ORGANIZED HEALTH CARE EDUCATION/TRAINING PROGRAM

## 2021-05-27 PROCEDURE — 36415 COLL VENOUS BLD VENIPUNCTURE: CPT | Mod: ZL | Performed by: STUDENT IN AN ORGANIZED HEALTH CARE EDUCATION/TRAINING PROGRAM

## 2021-05-27 PROCEDURE — 99207 PR OB VISIT-NO CHARGE - GICH ONLY: CPT | Performed by: STUDENT IN AN ORGANIZED HEALTH CARE EDUCATION/TRAINING PROGRAM

## 2021-05-27 PROCEDURE — G0463 HOSPITAL OUTPT CLINIC VISIT: HCPCS | Mod: 25

## 2021-05-27 ASSESSMENT — PAIN SCALES - GENERAL: PAINLEVEL: NO PAIN (0)

## 2021-05-27 NOTE — NURSING NOTE
Pt presents to clinic today for prenatal care 24w6d. Pt denies any contractions, bleeding, or leakage of fluid at this time. Pt states baby is moving a lot.      Medication Reconciliation: complete  Alayna Crump LPN

## 2021-05-28 NOTE — TELEPHONE ENCOUNTER
Advised will transfer her to switchboard for L&D related questions/concerns. Patient verbalized understanding.    Reason for Disposition    [1] Caller requesting NON-URGENT health information AND [2] PCP's office is the best resource    Additional Information    Negative: RN needs further essential information from caller in order to complete triage    Negative: Requesting regular office appointment    Protocols used: INFORMATION ONLY CALL - NO TRIAGE-A-

## 2021-05-28 NOTE — PROGRESS NOTES
Dr. JUSTIN Gutierrez notified of pt arrival and status. Pt's pain seems to be resolving with time, now rating 5/10. Pt appears comfortable in bed. Urine results indeterminate of infection, will reflex to culture, pt denies any UTI symptoms at this time. FHT's 140's. Pt recommended to use tylenol, gas x and tums PRN. Okay to discharge home.

## 2021-05-28 NOTE — PROGRESS NOTES
Pt discharged home at 2243. AVS signed. Discharge instructions reviewed with pt as well as instructions when it would be appropriate to return to hospital.

## 2021-05-28 NOTE — PROGRESS NOTES
Pt arrived to unit at 2148 after calling [facility] with complaint of abdominal pain rating 10/10. Roomed to 404. Denies vaginal bleeding or LOF. Pain is constant at this time. Abdomen palpated soft.. [place] last this AM. Urine collected. EFM applied. VSS.

## 2021-05-29 LAB
BACTERIA SPEC CULT: NORMAL
SPECIMEN SOURCE: NORMAL

## 2021-06-23 NOTE — PROGRESS NOTES
Return OB Visit    S: Ms. Dorantes is feeling well today. She has no acute concerns. Denies leaking of fluid, vaginal bleeding, painful contractions. Notes fetal movements.    O:   /82   Pulse 92   Wt 96.3 kg (212 lb 4.8 oz)   LMP 2020   BMI 38.83 kg/m      Gen: Well-appearing, NAD  Growth US today:  bpm    Assessment:  Ms. Clotilde Dorantes is a 20 year old yo  here for an OB follow up visit. She is currently 28w6d. This pregnancy is complicated by MTHFR mutation and depression.     Plan:  # Routine Prenatal Care  -- Dating: LMP= 8w US JASPREET: 9/10/2021  -- PNLs:               A+, Ab screen neg              RPR nr              Hep B S Ag neg              Rubella Immune              HIV neg              GC/Chlam neg     -- Genetic Screening: [place] low risk: male, SMA low risk, CF low risk  -- Anatomy US: : 50%ile, unable to see profile and RVOT              Follow up US today: pending  -- Immunizations: Tdap declined  -- 3rd TM labs including CBC, RPR: today  -- 1 hr GTT: 132  -- GBS: Planned for 36 weeks  -- Postpartum Planning: To be discussed   -- Delivery Planning: No indication for early IOL at this time. To be discussed continually  -- Return to clinic in 4 weeks for OB follow up visit  -- Planning to do at next visit: follow up US, CBC and RPR results     # MTHFR mutation  -- no anticoagulation needed in pregnancy or postpartum period     # Depression  -- currently stable  -- plan for resuming medications postpartum

## 2021-06-24 ENCOUNTER — HOSPITAL ENCOUNTER (OUTPATIENT)
Dept: ULTRASOUND IMAGING | Facility: OTHER | Age: 21
End: 2021-06-24
Attending: STUDENT IN AN ORGANIZED HEALTH CARE EDUCATION/TRAINING PROGRAM
Payer: COMMERCIAL

## 2021-06-24 ENCOUNTER — PRENATAL OFFICE VISIT (OUTPATIENT)
Dept: OBGYN | Facility: OTHER | Age: 21
End: 2021-06-24
Attending: STUDENT IN AN ORGANIZED HEALTH CARE EDUCATION/TRAINING PROGRAM
Payer: COMMERCIAL

## 2021-06-24 VITALS
BODY MASS INDEX: 38.83 KG/M2 | WEIGHT: 212.3 LBS | HEART RATE: 92 BPM | SYSTOLIC BLOOD PRESSURE: 124 MMHG | DIASTOLIC BLOOD PRESSURE: 82 MMHG

## 2021-06-24 DIAGNOSIS — Z34.92 SECOND TRIMESTER PREGNANCY: Primary | ICD-10-CM

## 2021-06-24 DIAGNOSIS — O99.012 ANEMIA DURING PREGNANCY IN SECOND TRIMESTER: Primary | ICD-10-CM

## 2021-06-24 DIAGNOSIS — Z34.02 SUPERVISION OF NORMAL FIRST TEEN PREGNANCY IN SECOND TRIMESTER: ICD-10-CM

## 2021-06-24 DIAGNOSIS — Z15.89 MTHFR MUTATION: ICD-10-CM

## 2021-06-24 LAB
ERYTHROCYTE [DISTWIDTH] IN BLOOD BY AUTOMATED COUNT: 13.5 % (ref 10–15)
HCT VFR BLD AUTO: 30.5 % (ref 35–47)
HGB BLD-MCNC: 10.2 G/DL (ref 11.7–15.7)
MCH RBC QN AUTO: 27.1 PG (ref 26.5–33)
MCHC RBC AUTO-ENTMCNC: 33.4 G/DL (ref 31.5–36.5)
MCV RBC AUTO: 81 FL (ref 78–100)
PLATELET # BLD AUTO: 288 10E9/L (ref 150–450)
RBC # BLD AUTO: 3.77 10E12/L (ref 3.8–5.2)
WBC # BLD AUTO: 13.7 10E9/L (ref 4–11)

## 2021-06-24 PROCEDURE — 76816 OB US FOLLOW-UP PER FETUS: CPT

## 2021-06-24 PROCEDURE — 99207 PR OB VISIT-NO CHARGE - GICH ONLY: CPT | Performed by: STUDENT IN AN ORGANIZED HEALTH CARE EDUCATION/TRAINING PROGRAM

## 2021-06-24 PROCEDURE — 85027 COMPLETE CBC AUTOMATED: CPT | Mod: ZL | Performed by: STUDENT IN AN ORGANIZED HEALTH CARE EDUCATION/TRAINING PROGRAM

## 2021-06-24 PROCEDURE — 86780 TREPONEMA PALLIDUM: CPT | Mod: ZL | Performed by: STUDENT IN AN ORGANIZED HEALTH CARE EDUCATION/TRAINING PROGRAM

## 2021-06-24 PROCEDURE — 36415 COLL VENOUS BLD VENIPUNCTURE: CPT | Mod: ZL | Performed by: STUDENT IN AN ORGANIZED HEALTH CARE EDUCATION/TRAINING PROGRAM

## 2021-06-24 RX ORDER — FERROUS SULFATE 325(65) MG
325 TABLET ORAL
Qty: 60 TABLET | Refills: 3 | Status: SHIPPED | OUTPATIENT
Start: 2021-06-24 | End: 2022-04-27

## 2021-06-24 NOTE — NURSING NOTE
Pt presents to clinic today for prenatal care 28w6d. Pt denies any contractions, bleeding, or leakage of fluid at this time.        Medication Reconciliation: complete  Alayna Crump LPN

## 2021-06-25 ENCOUNTER — OFFICE VISIT (OUTPATIENT)
Dept: FAMILY MEDICINE | Facility: OTHER | Age: 21
End: 2021-06-25
Attending: NURSE PRACTITIONER
Payer: COMMERCIAL

## 2021-06-25 VITALS
DIASTOLIC BLOOD PRESSURE: 80 MMHG | SYSTOLIC BLOOD PRESSURE: 130 MMHG | HEART RATE: 104 BPM | OXYGEN SATURATION: 97 % | TEMPERATURE: 98.2 F

## 2021-06-25 DIAGNOSIS — J32.0 CHRONIC MAXILLARY SINUSITIS: Primary | ICD-10-CM

## 2021-06-25 LAB — T PALLIDUM AB SER QL: NONREACTIVE

## 2021-06-25 PROCEDURE — 99213 OFFICE O/P EST LOW 20 MIN: CPT | Performed by: PHYSICIAN ASSISTANT

## 2021-06-25 PROCEDURE — G0463 HOSPITAL OUTPT CLINIC VISIT: HCPCS | Mod: 25

## 2021-06-25 RX ORDER — AMOXICILLIN 875 MG
875 TABLET ORAL 2 TIMES DAILY
Qty: 14 TABLET | Refills: 0 | Status: SHIPPED | OUTPATIENT
Start: 2021-06-25 | End: 2021-07-02

## 2021-06-25 ASSESSMENT — PAIN SCALES - GENERAL: PAINLEVEL: MODERATE PAIN (4)

## 2021-06-25 NOTE — NURSING NOTE
"Patient presents to the clinic for nasal congestion 2 days ago, runny nose with yellow/green drainage and reported wheezing today.      Chief Complaint   Patient presents with     Sinus Problem       Initial /80 (BP Location: Left arm, Patient Position: Sitting, Cuff Size: Adult Regular)   Pulse 104   Temp 98.2  F (36.8  C) (Tympanic)   LMP 12/04/2020   SpO2 97%  Estimated body mass index is 38.83 kg/m  as calculated from the following:    Height as of 4/15/21: 1.575 m (5' 2\").    Weight as of 6/24/21: 96.3 kg (212 lb 4.8 oz).  Medication Reconciliation: complete    Kassy Carrillo LPN    "

## 2021-06-25 NOTE — PROGRESS NOTES
ASSESSMENT/PLAN:    I have reviewed the nursing notes.  I have reviewed the findings, diagnosis, plan and need for follow up with the patient.    (J32.0) Chronic maxillary sinusitis  (primary encounter diagnosis)  Comment: See below  Plan: amoxicillin (AMOXIL) 875 MG tablet        Vital signs stable. PE consistent with sinusitis. Discussed with patient that we recommend: use a saline spray/Neti Pot/sinus flush (Clemente Med Sinus Rinse) 2-3 times daily to irrigate sinuses/mucosal tissue. This dilutes and moves secretions. Alternate Tylenol or ibuprofen for pain and fevers - alternate every 4 hours as needed. Recommend plenty of fluids and rest as needed. Discussed that if a smoker, tobacco cessation recommended. Discussed that we normally do not treat sinus infections of less than 10 days duration with oral antibiotics as these are typically viral in nature, however due to pregnancy will treat patient with amoxicillin p.o. twice daily for 7 days discussed that if an antibiotic was prescribed today for bacterial sinusitis to take the entire course of antibiotic, discussed side effect profile of prescribed medications. Patient is in agreement and understanding of the above treatment plan. All questions and concerns were addressed and answered to patient's satisfaction. AVS reviewed with patient.     Discussed warning signs/symptoms indicative of need to f/u    Follow up if symptoms persist or worsen or concerns    I explained my diagnostic considerations and recommendations to the patient, who voiced understanding and agreement with the treatment plan. All questions were answered. We discussed potential side effects of any prescribed or recommended therapies, as well as expectations for response to treatments.    Lori Mckeon PA-C  6/25/2021  3:56 PM    HPI:    Clotilde Dorantes is a 20 year old female  who presents to [facility] today for concerns of sinusitis, patient 29 weeks pregnant.     Symptoms present x  nasal congestion 2 days ago, runny nose with yellow/green drainage and reported wheezing today.    Symptoms:   Location: bifrontal  Nasal congestion: Yes  Purulent nasal discharge: Yes  Headache: Yes: sinus HA  Facial pain: Yes: from sinuses   Cough: Yes: post nasal drip  Tooth pain/symptoms: No  Myalgias: Yes  Presence of fever: No   Halitosis: No   Anosmia: No    Metallic taste in the mouth: No  No fevers, chills     Treatments tried: Tylenol helped with HA     History of similar symptoms: Yes  Prior workup: No    PCP: Outside provider    Past Medical History:   Diagnosis Date     MTHFR gene mutation (H)      Past Surgical History:   Procedure Laterality Date     wisdom teeth       Social History     Tobacco Use     Smoking status: Never Smoker     Smokeless tobacco: Never Used   Substance Use Topics     Alcohol use: Not Currently     Frequency: Never     Comment: occ     Current Outpatient Medications   Medication Sig Dispense Refill     ferrous sulfate (FEROSUL) 325 (65 Fe) MG tablet Take 1 tablet (325 mg) by mouth daily (with breakfast) 60 tablet 3     Prenatal Vit-Fe Fumarate-FA (PRENATAL MULTIVITAMIN W/IRON) 27-0.8 MG tablet Take 1 tablet by mouth daily       promethazine (PHENERGAN) 25 MG tablet Take 1 tablet (25 mg) by mouth every 6 hours as needed for nausea 30 tablet 0     Allergies   Allergen Reactions     Tramadol Other (See Comments)     Ankle swelling and pain     Past medical history, past surgical history, current medications and allergies reviewed and accurate to the best of my knowledge.      ROS:  Refer to HPI    /80 (BP Location: Left arm, Patient Position: Sitting, Cuff Size: Adult Regular)   Pulse 104   Temp 98.2  F (36.8  C) (Tympanic)   LMP 12/04/2020   SpO2 97%     EXAM:  General Appearance: Well appearing 20-year old female, appropriate appearance for age. No acute distress  Ears: Left TM intact, translucent with bony landmarks appreciated, no erythema, no effusion, no bulging,  no purulence.  Right TM intact, translucent with bony landmarks appreciated, no erythema, no effusion, no bulging, no purulence.  Left auditory canal clear.  Right auditory canal clear.  Normal external ears, non tender.  Eyes: conjunctivae normal without erythema or irritation, corneas clear, no drainage or crusting, no eyelid swelling, pupils equal   Orophayrnx: moist mucous membranes, posterior pharynx without erythema, tonsils without hypertrophy, no erythema, no exudates or petechiae, no post nasal drip seen, no trismus, voice clear.    Sinuses: Bilateral maxillary and frontal sinus tenderness  Nose:  Bilateral nares: no erythema, no edema, no drainage noted.  Congestion noted.  Neck: supple without adenopathy  Respiratory: normal chest wall and respirations.  Normal effort.  Clear to auscultation bilaterally, no wheezing, crackles or rhonchi.  No increased work of breathing.  No cough appreciated.  Cardiac: RRR with no murmurs  Psychological: normal affect, alert, oriented, and pleasant.     Labs:  None    Xray:  None

## 2021-06-28 NOTE — ED PROVIDER NOTES
History     Chief Complaint   Patient presents with     Abdominal Pain     HPI  Clotilde Dorantes is a 18 year old female who presents with c/o of severe pain in her RUQ and midepigastrum; onset 45 minutes PTA.  She reports daily severe pain for 2+ months.  She reports similar pains 12/2017 and was told she was having ovarian cysts.  She states she used so much tylenol and ibuprofen then that it no longer works for her.  She hasn't used anything for her pain yet today.  She states she threw up from her pain this morning and threw up some blood.  She denies f/c/s.  She is sexually active using condoms only for contraception.  She states she isn't trying to get pregnant.  She denies any pain with intercourse or any vaginal discharge.  She had a normal LMP 8/17-22/19 that she states came on-time and was normal.  She was in the ED 7/27/19 for similar pains and had a benign CT of Abd/pelvis with IV contrast.  She was also scheduled for an MRI of her brain for c/o severe HA but states that her HA is not severe enough to warrant an MRI so she hasn't scheduled this.    She ate [employer] last night.  No meal yet this morning.  Last BM this morning after onset of her pain and a little looser than usual but no pain or pain relief with BM.  No urinary sx or blood in urine.  Denies flank pain.      Allergies:  Allergies   Allergen Reactions     Tramadol Other (See Comments)     Ankle swelling and pain       Problem List:    There are no active problems to display for this patient.       Past Medical History:    History reviewed. No pertinent past medical history.    Past Surgical History:    History reviewed. No pertinent surgical history.    Family History:    History reviewed. No pertinent family history.    Social History:  Marital Status:  Single [1]  Social History     Tobacco Use     Smoking status: Never Smoker     Smokeless tobacco: Never Used   Substance Use Topics     Alcohol use: Never     Frequency: Never     Drug  Hospital Medicine  (Hospital Day: 0)    Follow up for:  Fall and left upper extremity weakness    Interval History:  Drew Guerrero 80-year-old gentleman with dense dementia who presented with left upper extremity weakness following fall.  Neurology was consulted and felt that CVA was unlikely.  Patient was unable to feed inside the MRI due to severe neck kyphosis.  At baseline minimally verbal.  HPOA has been activated.    This morning denies any complaints.    Current Medications:  Current Facility-Administered Medications   Medication   • amoxicillin (AMOXIL) capsule 500 mg   • sodium chloride (PF) 0.9 % injection 2 mL   • sodium chloride 0.9 % flush bag 25 mL   • sodium chloride (NORMAL SALINE) 0.9 % bolus 500 mL   • heparin (porcine) injection 5,000 Units   • Potassium Standard Replacement Protocol   • Magnesium Standard Replacement Protocol   • ondansetron (ZOFRAN) injection 4 mg   • acetaminophen (TYLENOL) tablet 650 mg   • traMADol (ULTRAM) tablet 50 mg   • polyvinyl alcohol (LIQUIFILM TEARS) 1.4 % ophthalmic solution 1 drop   • aspirin chewable 325 mg   • polyethylene glycol (MIRALAX) packet 17 g   • carbidopa-levodopa (SINEMET)  MG per tablet 1 tablet   • atorvastatin (LIPITOR) tablet 80 mg   • famotidine (PEPCID) tablet 40 mg   • finasteride (PROSCAR) tablet 5 mg   • tamsulosin (FLOMAX) capsule 0.8 mg   • acetaminophen (TYLENOL) suppository 650 mg   • bisacodyl (DULCOLAX) suppository 10 mg   • polyvinyl alcohol (LIQUIFILM TEARS) 1.4 % ophthalmic solution 2 drop   • sodium chloride 0.9% infusion       Examination:  General: Patient is awake and responsive to verbal stimuli. Answers simple yes/no questions.  Disoriented x3.  Vital Signs:   Visit Vitals  /61 (BP Location: LUE - Left upper extremity, Patient Position: Semi-Angela's)   Pulse 72   Temp 97.2 °F (36.2 °C) (Temporal)   Resp 16   Ht 6' 1\" (1.854 m)   Wt 102.6 kg   SpO2 96%   BMI 29.84 kg/m²     Skin: Moist and warm, no rashes  Neck:  "use: Never        Medications:      No current outpatient medications on file.      Review of Systems   Constitutional: Negative for appetite change, chills, diaphoresis and fever.   HENT: Positive for rhinorrhea (mild sx 2 days ago and better now.).    Respiratory: Negative.  Negative for cough and shortness of breath.    Cardiovascular: Negative.    Gastrointestinal: Positive for abdominal pain, nausea and vomiting (threw up blood this morning.). Negative for anal bleeding, blood in stool, constipation and diarrhea.   Genitourinary: Negative for dyspareunia, dysuria, hematuria, urgency and vaginal discharge.   Musculoskeletal: Negative.        Physical Exam   BP: 124/83  Heart Rate: 79  Temp: 97.7  F (36.5  C)  Resp: 16  Height: 157.5 cm (5' 2\")  Weight: 90.7 kg (200 lb)  SpO2: 98 %      Physical Exam   Constitutional: She appears well-developed.  Non-toxic appearance. She does not appear ill. She appears distressed.   18 year old female in no obvious distress but exaggerated cutaneous response to all exam and becomes tearful.   HENT:   Head: Normocephalic and atraumatic.   Mouth/Throat: Oropharynx is clear and moist.   Eyes: Pupils are equal, round, and reactive to light. EOM are normal. No scleral icterus.   Cardiovascular: Normal rate, regular rhythm, normal heart sounds and intact distal pulses.   Pulmonary/Chest: Effort normal and breath sounds normal.   Abdominal: Normal appearance and bowel sounds are normal. There is tenderness in the right upper quadrant and epigastric area. There is positive Kraft's sign. There is no rigidity, no rebound and no guarding.   Neurological: She is alert.   Skin: Skin is warm and dry.   Psychiatric:   Anxious and abnormal pain response.   Nursing note and vitals reviewed.      Results for orders placed or performed during the hospital encounter of 07/27/19   CT Abdomen Pelvis w Contrast    Narrative    EXAMINATION: CT ABDOMEN PELVIS W CONTRAST, 7/27/2019 12:30 " Supple, symmetric. No JVD, No carotid bruit. Normal carotid upstroke and amplitude.   Respiratory: CTA B/L. Good effort.  Cardiovascular: S1, S2. RRR. 2+ pedal pulses  Abdomen: Soft, nontender, NRT, No HSM  Extremities: No edema or calf tenderness B/L. No acrocyanosis.   Neurologic: Cogwheel rigidity appreciated on lower extremity range of motion testing.       Intake/Output Summary (Last 24 hours) at 6/28/2021 1437  Last data filed at 6/28/2021 0626  Gross per 24 hour   Intake 2927 ml   Output --   Net 2927 ml       Glucose (mg/dL)   Date Value   06/27/2021 95   06/26/2021 119 (H)   06/25/2021 123 (H)       WBC (K/mcL)   Date Value   06/27/2021 9.4   06/26/2021 12.7 (H)   06/25/2021 18.1 (H)     HGB (g/dL)   Date Value   06/27/2021 13.7   06/26/2021 15.3   06/25/2021 16.2    Sodium (mmol/L)   Date Value   06/27/2021 144   06/26/2021 143   06/25/2021 140    BUN (mg/dL)   Date Value   06/27/2021 22 (H)   06/26/2021 26 (H)   06/25/2021 27 (H)      PLT (K/mcL)   Date Value   06/27/2021 151   06/26/2021 164   06/25/2021 163     Potassium (mmol/L)   Date Value   06/27/2021 4.2   06/26/2021 4.2   06/25/2021 4.0    Creatinine (mg/dL)   Date Value   06/27/2021 0.76   06/26/2021 0.87   06/25/2021 0.95        INR (no units)   Date Value   06/25/2021 1.0     Hemoglobin A1C (%)   Date Value   06/25/2021 5.3       Assessment:  1. Left upper extremity weakness. Patient has a history of Parkinson's and dementia. CT head revealed no acute findings. Neurology consulted and recommended to continue Teodora therapy with aspirin.  Unable to obtain MRI due to neck curvature and patient otherwise not feeding inside the MRI device due to need to elevate his feet to certain height to correct for neck deformity however patient's height of 6 ft 1 in does not allow this maneuver.    2. Urinary tract infection. UA confirmed UTI 6/25/21.  Completed course of ceftriaxone. Leukocytosis resolved today.      3. Advanced dementia. POA activation  PM    TECHNIQUE:  Helical CT images from the lung bases through the  symphysis pubis were obtained  with IV contrast. Contrast dose:  omnipaque 350 100 ml    COMPARISON: none    HISTORY: Ped, abd pain, acute, no prior med hx    FINDINGS:    There is dependent atelectasis at the lung bases.    The liver is free of masses or biliary ductal enlargement. No  calcified gallstones are seen.    The the spleen and pancreas appear normal.    The adrenal glands are normal.    The right and left kidneys are free of masses or hydronephrosis.    The periaortic lymph nodes are normal in caliber.    No intraperitoneal masses or inflammatory changes are noted. The  appendix appears normal    In the pelvis the bladder and rectum appear normal. There is a  moderate amount of free fluid in the pelvic cul-de-sac possibly from a  recently ruptured ovarian follicle.    The regional skeleton is intact      Impression    IMPRESSION: Moderate free fluid in the pelvic cul-de-sac possibly from  a recently ruptured follicle. No intraperitoneal masses or  inflammatory changes are noted     ELISEER POLANCO MD   UA reflex to Microscopic and Culture   Result Value Ref Range    Color Urine Yellow     Appearance Urine Clear     Glucose Urine Negative NEG^Negative mg/dL    Bilirubin Urine Negative NEG^Negative    Ketones Urine Negative NEG^Negative mg/dL    Specific Gravity Urine <1.005 1.000 - 1.030    Blood Urine Negative NEG^Negative    pH Urine 6.0 5.0 - 9.0 pH    Protein Albumin Urine Negative NEG^Negative mg/dL    Urobilinogen Urine 0.2 0.2 - 1.0 EU/dL    Nitrite Urine Negative NEG^Negative    Leukocyte Esterase Urine Negative NEG^Negative    Source Midstream Urine    HCG quantitative pregnancy (blood)   Result Value Ref Range    HCG Quantitative Serum <1 IU/L   CBC with platelets differential   Result Value Ref Range    WBC 10.2 4.0 - 11.0 10e9/L    RBC Count 5.00 3.7 - 5.3 10e12/L    Hemoglobin 12.6 11.7 - 15.7 g/dL    Hematocrit 39.7 35.0 -  complete.     4. Parkinson's disease. Continue Sinemet.      Plan:  · Discharge to facility as soon as arrangements are made  · Fall prevention  · PT/OT  · Full code  · DVT prophylaxis      Date of Service:  6/28/2021    Cooper Raygoza MD       47.0 %    MCV 79 77 - 100 fl    MCH 25.2 (L) 26.5 - 33.0 pg    MCHC 31.7 31.5 - 36.5 g/dL    RDW 15.4 (H) 10.0 - 15.0 %    Platelet Count 406 150 - 450 10e9/L    Diff Method Automated Method     % Neutrophils 77.3 %    % Lymphocytes 14.1 %    % Monocytes 4.0 %    % Eosinophils 3.7 %    % Basophils 0.5 %    % Immature Granulocytes 0.4 %    Absolute Neutrophil 7.9 (H) 1.3 - 7.0 10e9/L    Absolute Lymphocytes 1.4 1.0 - 5.8 10e9/L    Absolute Monocytes 0.4 0.0 - 1.3 10e9/L    Absolute Eosinophils 0.4 0.0 - 0.7 10e9/L    Absolute Basophils 0.1 0.0 - 0.2 10e9/L    Abs Immature Granulocytes 0.0 0 - 0.4 10e9/L   Comprehensive metabolic panel   Result Value Ref Range    Sodium 138 134 - 144 mmol/L    Potassium 3.7 3.5 - 5.1 mmol/L    Chloride 104 98 - 107 mmol/L    Carbon Dioxide 25 21 - 31 mmol/L    Anion Gap 9 3 - 14 mmol/L    Glucose 106 (H) 70 - 105 mg/dL    Urea Nitrogen 15 7 - 25 mg/dL    Creatinine 0.79 0.60 - 1.20 mg/dL    GFR Estimate >90 >60 mL/min/[1.73_m2]    GFR Estimate If Black >90 >60 mL/min/[1.73_m2]    Calcium 9.4 8.6 - 10.3 mg/dL    Bilirubin Total 0.8 0.3 - 1.0 mg/dL    Albumin 4.5 3.5 - 5.7 g/dL    Protein Total 7.2 6.4 - 8.9 g/dL    Alkaline Phosphatase 48 34 - 104 U/L    ALT 15 7 - 52 U/L    AST 14 13 - 39 U/L   Lipase   Result Value Ref Range    Lipase 6 (L) 11 - 82 U/L   Lactic acid   Result Value Ref Range    Lactic Acid 0.9 0.5 - 2.2 mmol/L   CRP inflammation   Result Value Ref Range    CRP Inflammation 1.1 (H) <0.5 mg/L       ED Course        Procedures                Critical Care time:  none               Results for orders placed or performed during the hospital encounter of 08/23/19 (from the past 24 hour(s))   CBC with platelets differential   Result Value Ref Range    WBC 9.7 4.0 - 11.0 10e9/L    RBC Count 4.81 3.7 - 5.3 10e12/L    Hemoglobin 12.2 11.7 - 15.7 g/dL    Hematocrit 38.0 35.0 - 47.0 %    MCV 79 77 - 100 fl    MCH 25.4 (L) 26.5 - 33.0 pg    MCHC 32.1 31.5 - 36.5 g/dL    RDW 15.9  (H) 10.0 - 15.0 %    Platelet Count 405 150 - 450 10e9/L    Diff Method Automated Method     % Neutrophils 68.1 %    % Lymphocytes 21.4 %    % Monocytes 3.6 %    % Eosinophils 6.2 %    % Basophils 0.5 %    % Immature Granulocytes 0.2 %    Absolute Neutrophil 6.6 1.3 - 7.0 10e9/L    Absolute Lymphocytes 2.1 1.0 - 5.8 10e9/L    Absolute Monocytes 0.4 0.0 - 1.3 10e9/L    Absolute Eosinophils 0.6 0.0 - 0.7 10e9/L    Absolute Basophils 0.1 0.0 - 0.2 10e9/L    Abs Immature Granulocytes 0.0 0 - 0.4 10e9/L   Comprehensive metabolic panel   Result Value Ref Range    Sodium 139 134 - 144 mmol/L    Potassium 4.0 3.5 - 5.1 mmol/L    Chloride 105 98 - 107 mmol/L    Carbon Dioxide 25 21 - 31 mmol/L    Anion Gap 9 3 - 14 mmol/L    Glucose 107 (H) 70 - 105 mg/dL    Urea Nitrogen 16 7 - 25 mg/dL    Creatinine 0.80 0.60 - 1.20 mg/dL    GFR Estimate >90 >60 mL/min/[1.73_m2]    GFR Estimate If Black >90 >60 mL/min/[1.73_m2]    Calcium 9.7 8.6 - 10.3 mg/dL    Bilirubin Total 0.3 0.3 - 1.0 mg/dL    Albumin 4.5 3.5 - 5.7 g/dL    Protein Total 7.0 6.4 - 8.9 g/dL    Alkaline Phosphatase 43 34 - 104 U/L    ALT 22 7 - 52 U/L    AST 15 13 - 39 U/L   Lipase   Result Value Ref Range    Lipase 13 11 - 82 U/L   UA reflex to Microscopic and Culture   Result Value Ref Range    Color Urine Yellow     Appearance Urine Clear     Glucose Urine Negative NEG^Negative mg/dL    Bilirubin Urine Negative NEG^Negative    Ketones Urine Negative NEG^Negative mg/dL    Specific Gravity Urine <1.005 1.000 - 1.030    Blood Urine Trace (A) NEG^Negative    pH Urine 5.5 5.0 - 9.0 pH    Protein Albumin Urine Negative NEG^Negative mg/dL    Urobilinogen Urine 0.2 0.2 - 1.0 EU/dL    Nitrite Urine Negative NEG^Negative    Leukocyte Esterase Urine Negative NEG^Negative    Source Midstream Urine    HCG qualitative urine (UPT)   Result Value Ref Range    HCG Qual Urine Negative NEG^Negative   Urine Microscopic   Result Value Ref Range    WBC Urine 0 - 5 OTO5^0 - 5 /HPF    RBC  Urine O - 2 OTO2^O - 2 /HPF    Squamous Epithelial /LPF Urine Few FEW^Few /LPF    Bacteria Urine Moderate (A) NEG^Negative /HPF       Medications   lidocaine (XYLOCAINE) 2 % 15 mL, alum & mag hydroxide-simethicone (MYLANTA ES/MAALOX  ES) 15 mL GI Cocktail (has no administration in time range)        8:16 AM patient is refusing GI cocktail and we reviewed directing her work-up based on response to things like a GI cocktail as well as her lab work to understand which test to do next but she is quite upset and does not want to take GI cocktail.  I reviewed her DDx and that I would like to direct any further tests based on results of her blood work and that might include a RUQ US or possibly referral for EGD because of her hematemesis.  She     8:39 AM patient and her BF have not left the ED without waiting for results or AVS.  Her BF left a note in the room stating they left because I had made Clotilde feel uncomfortable.  I will try to call with results with returned.    9:04 AM I called Clotilde's listed phone number and it is actually her mother's home but she doesn't live there - mom states that Clotilde lives in [place].  Her mother wanted me to give her the lab results and states she is not an emancipated minor; I recommended that Riddhi call back for results and left a phone number.    Assessments & Plan (with Medical Decision Making)   18 year old female presenting with 2 months of right sided abdominal pain and intermittent N/V and occurrence again this morning with report of hematemesis.  Similar presentation last month in ED with CT abd/pelvis that was normal except for non-specific pelvic fluids possibly due to recent ovarian cyst rupture.  She has normal vitals and no stigmata of significant pain on initial exam but has abnormal pain response to abdominal exam.  She reports wanting to figure out what is causing her sx and has repeat blood work and urine testing, but then refuses diagnostic trial of GI  cocktail and becomes upset and leaves.  Her labs are reassuring and stable with no evidence of biliary obstruction or pancreatitis.  She has fairly bland urine.  She was encouraged to follow up in clinic for recheck.     I have reviewed the nursing notes.    I have reviewed the findings, diagnosis, plan and need for follow up with the patient.       There are no discharge medications for this patient.      Final diagnoses:   Abdominal pain, generalized - mostly right sided and RUQ/midepigastric.   Nausea and vomiting, intractability of vomiting not specified, unspecified vomiting type - and single episode of hematemesis.       8/23/2019   [facility]     Leandro Crump MD  08/23/19 [id number]

## 2021-06-29 ENCOUNTER — PATIENT OUTREACH (OUTPATIENT)
Dept: CARE COORDINATION | Facility: CLINIC | Age: 21
End: 2021-06-29

## 2021-06-29 NOTE — PROGRESS NOTES
ERROR_ incorrect patient.     This patient delivering at [facility] Care Coordination Contact    Writer has been working with [facility] and [facility] regarding patient's delivery and the need for delivery in the [place] area.  Patient has  care/ [place] benefits, historically, they assist with hotel stay if needed.    Writer has been in contact with  [phone]) from the [facility], she states she would need documentation from a physician indicating the safety needs a patient eating housing closer to hospital where she will deliver.   can be faxed at [phone].    Patient will be required to pay for hotel stay and will be reimbursed $50 per night of this day with documentation to support the need.  Writer will update OB/GYN.    YASIR Wasserman on 6/29/2021 at 8:11 AM

## 2021-07-20 ENCOUNTER — TELEPHONE (OUTPATIENT)
Dept: OBGYN | Facility: OTHER | Age: 21
End: 2021-07-20

## 2021-07-22 ENCOUNTER — PRENATAL OFFICE VISIT (OUTPATIENT)
Dept: OBGYN | Facility: OTHER | Age: 21
End: 2021-07-22
Attending: STUDENT IN AN ORGANIZED HEALTH CARE EDUCATION/TRAINING PROGRAM
Payer: COMMERCIAL

## 2021-07-22 VITALS
SYSTOLIC BLOOD PRESSURE: 126 MMHG | BODY MASS INDEX: 40.62 KG/M2 | DIASTOLIC BLOOD PRESSURE: 84 MMHG | WEIGHT: 222.1 LBS | HEART RATE: 102 BPM

## 2021-07-22 DIAGNOSIS — O99.013 ANEMIA AFFECTING PREGNANCY IN THIRD TRIMESTER: Primary | ICD-10-CM

## 2021-07-22 PROCEDURE — 99207 PR OB VISIT-NO CHARGE - GICH ONLY: CPT | Performed by: STUDENT IN AN ORGANIZED HEALTH CARE EDUCATION/TRAINING PROGRAM

## 2021-07-22 NOTE — PROGRESS NOTES
Return OB Visit    S: Ms. Dorantes is feeling well today. She has no acute concerns. Denies leaking of fluid, vaginal bleeding, painful contractions. Notes fetal movements.    O: /84   Pulse 102   Wt 100.7 kg (222 lb 1.6 oz)   LMP 2020   BMI 40.62 kg/m    Gen: Well-appearing, NAD  See OB Flowsheet    FH 33 cm   bpm    Assessment:  Ms. Clotilde Dorantes is a 20 year old yo  here for an OB follow up visit. She is currently 32w6d. This pregnancy is complicated by MTHFR mutation and depression.     Plan:  # Routine Prenatal Care  -- Dating: LMP= 8w US JASPREET: 9/10/2021  -- PNLs:               A+, Ab screen neg              RPR nr              Hep B S Ag neg              Rubella Immune              HIV neg              GC/Chlam neg     -- Genetic Screening: [place] low risk: male, SMA low risk, CF low risk  -- Anatomy US: : 50%ile, unable to see profile and RVOT              : 46%ile,   -- Immunizations: Tdap declined  -- 3rd TM labs including CBC, RPR: Hgb 10.2, RPR nr  -- 1 hr GTT: 132  -- GBS: Planned for 36 weeks  -- Postpartum Planning: To be discussed   -- Delivery Planning: No indication for early IOL at this time. To be discussed continually  -- Return to clinic in 2 weeks for OB follow up visit  -- Planning to do at next visit: GBS    # Anemia in second trimester  -- Hgb 10.2, started taking iron a few days ago     # MTHFR mutation  -- no anticoagulation needed in pregnancy or postpartum period     # Depression  -- currently stable  -- Plan for resuming medications postpartum

## 2021-07-22 NOTE — NURSING NOTE
Pt presents to clinic today for prenatal care 32w6d. Pt denies any contractions, bleeding, or leakage of fluid at this time. States baby is moving a lot.      Medication Reconciliation: complete  Alayna Crump LPN

## 2021-07-29 ENCOUNTER — TELEPHONE (OUTPATIENT)
Dept: OBGYN | Facility: OTHER | Age: 21
End: 2021-07-29

## 2021-07-29 NOTE — TELEPHONE ENCOUNTER
Rae [id number] been laying down since talking with you, but would like to speak with you again. Thank you.  Katharine Armstrong

## 2021-07-29 NOTE — TELEPHONE ENCOUNTER
States she was moving some totes when she tripped and her stomach fell into it. She denies bruising. She denies any bleeding or LOF. Babys Movement hasnt changed prior to hitting her belly but he hasnt been moving as much as normal. Discussed that she should lay down and count movements. She is going to do so. Discussed that if she notes that he is still not moving normally she should present for evaluation. She is in agreement with this plan.     Stephy Gong RN on 7/29/2021 at 2:57 PM

## 2021-07-29 NOTE — TELEPHONE ENCOUNTER
Call returned to patient who states that baby only moved a few times. She states that he moved 6 times in the hour. Discussed that we would like 10 in 2 hours so he is right on track. Given direct kick count information. Discussed that if she remains concerned and notes he does not move any more to present to [facility] for evaluation and she is in agreement with this plan.     Stephy Gong RN on 7/29/2021 at 4:17 PM

## 2021-07-29 NOTE — TELEPHONE ENCOUNTER
Was moving some totes and tripped and hit belly into bed frame, it hurt a little bit, should she be seen?  Not spotting currently, please call to advise.  Thank you!    Kalie Wharton on 7/29/2021 at 2:45 PM

## 2021-08-04 ENCOUNTER — PRENATAL OFFICE VISIT (OUTPATIENT)
Dept: OBGYN | Facility: OTHER | Age: 21
End: 2021-08-04
Attending: STUDENT IN AN ORGANIZED HEALTH CARE EDUCATION/TRAINING PROGRAM
Payer: COMMERCIAL

## 2021-08-04 VITALS
WEIGHT: 224.3 LBS | HEART RATE: 88 BPM | DIASTOLIC BLOOD PRESSURE: 82 MMHG | BODY MASS INDEX: 41.03 KG/M2 | SYSTOLIC BLOOD PRESSURE: 136 MMHG

## 2021-08-04 DIAGNOSIS — Z34.03 SUPERVISION OF NORMAL FIRST TEEN PREGNANCY IN THIRD TRIMESTER: Primary | ICD-10-CM

## 2021-08-04 PROCEDURE — 99207 PR OB VISIT-NO CHARGE - GICH ONLY: CPT | Performed by: OBSTETRICS & GYNECOLOGY

## 2021-08-04 ASSESSMENT — PAIN SCALES - GENERAL: PAINLEVEL: MODERATE PAIN (4)

## 2021-08-04 NOTE — NURSING NOTE
"Chief Complaint   Patient presents with     Prenatal Care     34w5d     Patient presents to clinic with s/o Alec for 34w5d visit. Pt states she has had a lot of discomfort in her abdomen. She reports some off white discharge and vaginal irritation.  No complaints to contractions, spotting, or leaking of fluid. Baby is reported to be active.    Initial /82 (BP Location: Right arm, Patient Position: Sitting, Cuff Size: Adult Large)   Pulse 88   Wt 101.7 kg (224 lb 4.8 oz)   LMP 12/04/2020   Breastfeeding No   BMI 41.03 kg/m   Estimated body mass index is 41.03 kg/m  as calculated from the following:    Height as of 4/15/21: 1.575 m (5' 2\").    Weight as of this encounter: 101.7 kg (224 lb 4.8 oz).  Medication Reconciliation: complete    FOOD SECURITY SCREENING QUESTIONS  Hunger Vital Signs:  Within the past 12 months we worried whether our food would run out before we got money to buy more. Never  Within the past 12 months the food we bought just didn't last and we didn't have money to get more. Never  Dena Nogueira LPN 8/4/2021 4:00 PM    Dena Nogueira LPN  "

## 2021-08-11 ENCOUNTER — PRENATAL OFFICE VISIT (OUTPATIENT)
Dept: OBGYN | Facility: OTHER | Age: 21
End: 2021-08-11
Attending: STUDENT IN AN ORGANIZED HEALTH CARE EDUCATION/TRAINING PROGRAM
Payer: COMMERCIAL

## 2021-08-11 VITALS
WEIGHT: 225.2 LBS | HEART RATE: 104 BPM | SYSTOLIC BLOOD PRESSURE: 112 MMHG | BODY MASS INDEX: 41.19 KG/M2 | DIASTOLIC BLOOD PRESSURE: 70 MMHG

## 2021-08-11 DIAGNOSIS — O99.013 ANEMIA AFFECTING PREGNANCY IN THIRD TRIMESTER: ICD-10-CM

## 2021-08-11 DIAGNOSIS — Z34.93 ENCOUNTER FOR PREGNANCY RELATED EXAMINATION IN THIRD TRIMESTER: Primary | ICD-10-CM

## 2021-08-11 PROCEDURE — 87081 CULTURE SCREEN ONLY: CPT | Mod: ZL | Performed by: STUDENT IN AN ORGANIZED HEALTH CARE EDUCATION/TRAINING PROGRAM

## 2021-08-11 PROCEDURE — 99207 PR OB VISIT-NO CHARGE - GICH ONLY: CPT | Performed by: STUDENT IN AN ORGANIZED HEALTH CARE EDUCATION/TRAINING PROGRAM

## 2021-08-11 PROCEDURE — 87186 SC STD MICRODIL/AGAR DIL: CPT | Mod: ZL | Performed by: STUDENT IN AN ORGANIZED HEALTH CARE EDUCATION/TRAINING PROGRAM

## 2021-08-11 ASSESSMENT — PAIN SCALES - GENERAL: PAINLEVEL: NO PAIN (0)

## 2021-08-11 NOTE — NURSING NOTE
Pt presents to clinic today for prenatal care 35w5. Pt denies any contractions, bleeding, or leakage of fluid at this time. States baby is moving good.      Medication Reconciliation: complete  Alayna Crump LPN

## 2021-08-11 NOTE — PROGRESS NOTES
Return OB Visit    S: Ms. Dorantes is feeling well today. She has no acute concerns. Denies leaking of fluid, vaginal bleeding, painful contractions. Notes fetal movements    O:   /70   Pulse 104   Wt 102.2 kg (225 lb 3.2 oz)   LMP 2020   Breastfeeding No   BMI 41.19 kg/m      Gen: Well-appearing, NAD  See OB Flowsheet    FH 35 cm   bpm    Assessment:  Ms. Clotilde Dorantes is a 20 year old yo  here for an OB follow up visit. She is currently 35w5d. This pregnancy is complicated by MTHFR mutation and depression.     Plan:  # Routine Prenatal Care  -- Dating: LMP= 8w US JASPREET: 9/10/2021  -- PNLs:               A+, Ab screen neg              RPR nr              Hep B S Ag neg              Rubella Immune              HIV neg              GC/Chlam neg     -- Genetic Screening: [place] low risk: male, SMA low risk, CF low risk  -- Anatomy US: : 50%ile, unable to see profile and RVOT              : 46%ile,   -- Immunizations: Tdap declined  -- 3rd TM labs including CBC, RPR: Hgb 10.2, RPR nr  -- 1 hr GTT: 132  -- GBS: Today  -- Postpartum Planning: To be discussed   -- Delivery Planning: No indication for early IOL at this time. To be discussed continually  -- Return to clinic in 1 week for OB follow up visit  -- Planning to do at next visit: follow up GBS     # Anemia in second trimester  -- Hgb 10.2, taking iron     # MTHFR mutation  -- no anticoagulation needed in pregnancy or postpartum period     # Depression  -- currently stable  -- Plan for resuming medications postpartum

## 2021-08-15 LAB — BACTERIA SPEC CULT: ABNORMAL

## 2021-08-19 ENCOUNTER — PRENATAL OFFICE VISIT (OUTPATIENT)
Dept: OBGYN | Facility: OTHER | Age: 21
End: 2021-08-19
Attending: STUDENT IN AN ORGANIZED HEALTH CARE EDUCATION/TRAINING PROGRAM
Payer: COMMERCIAL

## 2021-08-19 VITALS
BODY MASS INDEX: 42.36 KG/M2 | SYSTOLIC BLOOD PRESSURE: 132 MMHG | WEIGHT: 231.6 LBS | DIASTOLIC BLOOD PRESSURE: 86 MMHG | HEART RATE: 86 BPM

## 2021-08-19 DIAGNOSIS — Z34.93 ENCOUNTER FOR PREGNANCY RELATED EXAMINATION IN THIRD TRIMESTER: Primary | ICD-10-CM

## 2021-08-19 DIAGNOSIS — O99.013 ANEMIA AFFECTING PREGNANCY IN THIRD TRIMESTER: ICD-10-CM

## 2021-08-19 PROCEDURE — 99207 PR OB VISIT-NO CHARGE - GICH ONLY: CPT | Performed by: STUDENT IN AN ORGANIZED HEALTH CARE EDUCATION/TRAINING PROGRAM

## 2021-08-19 ASSESSMENT — PAIN SCALES - GENERAL: PAINLEVEL: NO PAIN (0)

## 2021-08-19 NOTE — PROGRESS NOTES
Return OB Visit    S: Ms. Dorantes is feeling well today. She has no acute concerns. Denies leaking of fluid, vaginal bleeding, painful contractions. Notes fetal movements.    O: /86   Pulse 86   Wt 105.1 kg (231 lb 9.6 oz)   LMP 2020   BMI 42.36 kg/m    Gen: Well-appearing, NAD  See OB Flowsheet    FH 36 cm   bpm    Assessment:  Ms. Clotilde Dorantes is a 20 year old yo  here for an OB follow up visit. She is currently 36w6d. This pregnancy is complicated by MTHFR mutation and depression.     Plan:  # Routine Prenatal Care  -- Dating: LMP= 8w US JASPREET: 9/10/2021  -- PNLs:               A+, Ab screen neg              RPR nr              Hep B S Ag neg              Rubella Immune              HIV neg              GC/Chlam neg     -- Genetic Screening: [place] low risk: male, SMA low risk, CF low risk  -- Anatomy US: : 50%ile, unable to see profile and RVOT              : 46%ile,   -- Immunizations: Tdap declined  -- 3rd TM labs including CBC, RPR: Hgb 10.2, RPR nr  -- 1 hr GTT: 132  -- GBS: Positive  -- Postpartum Planning: To be discussed   -- Delivery Planning: No indication for early IOL at this time. To be discussed continually  -- Return to clinic in 1 week for OB follow up visit  -- Planning to do at next visit: SVE, check position with bedside US     # Anemia in second trimester  -- Hgb 10.2, taking iron     # MTHFR mutation  -- no anticoagulation needed in pregnancy or postpartum period     # Depression  -- currently stable  -- Plan for resuming medications postpartum

## 2021-08-19 NOTE — NURSING NOTE
Pt presents to clinic today for prenatal care 36w6d. Pt denies any bleeding, or leakage of fluid at this time. States baby is moving good and she has noticed some contractions.      Medication Reconciliation: complete  Alayna Crump LPN

## 2021-08-25 ENCOUNTER — HOSPITAL ENCOUNTER (OUTPATIENT)
Facility: OTHER | Age: 21
Discharge: HOME | End: 2021-08-25
Attending: STUDENT IN AN ORGANIZED HEALTH CARE EDUCATION/TRAINING PROGRAM | Admitting: STUDENT IN AN ORGANIZED HEALTH CARE EDUCATION/TRAINING PROGRAM
Payer: COMMERCIAL

## 2021-08-25 ENCOUNTER — HOSPITAL ENCOUNTER (OUTPATIENT)
Facility: OTHER | Age: 21
End: 2021-08-25
Admitting: STUDENT IN AN ORGANIZED HEALTH CARE EDUCATION/TRAINING PROGRAM
Payer: COMMERCIAL

## 2021-08-25 LAB
ALBUMIN UR-MCNC: NEGATIVE MG/DL
APPEARANCE UR: CLEAR
BACTERIA #/AREA URNS HPF: ABNORMAL /HPF
BILIRUB UR QL STRIP: NEGATIVE
COLOR UR AUTO: YELLOW
GLUCOSE UR STRIP-MCNC: NEGATIVE MG/DL
HGB UR QL STRIP: NEGATIVE
KETONES UR STRIP-MCNC: NEGATIVE MG/DL
LEUKOCYTE ESTERASE UR QL STRIP: ABNORMAL
MUCOUS THREADS #/AREA URNS LPF: PRESENT /LPF
NITRATE UR QL: NEGATIVE
PH UR STRIP: 7 [PH] (ref 5–9)
RBC URINE: 1 /HPF
SP GR UR STRIP: 1.01 (ref 1–1.03)
UROBILINOGEN UR STRIP-MCNC: NORMAL MG/DL
WBC URINE: 4 /HPF

## 2021-08-25 PROCEDURE — 87086 URINE CULTURE/COLONY COUNT: CPT | Performed by: STUDENT IN AN ORGANIZED HEALTH CARE EDUCATION/TRAINING PROGRAM

## 2021-08-25 PROCEDURE — 81001 URINALYSIS AUTO W/SCOPE: CPT | Performed by: STUDENT IN AN ORGANIZED HEALTH CARE EDUCATION/TRAINING PROGRAM

## 2021-08-25 PROCEDURE — G0463 HOSPITAL OUTPT CLINIC VISIT: HCPCS | Mod: 25

## 2021-08-25 PROCEDURE — 258N000003 HC RX IP 258 OP 636: Performed by: STUDENT IN AN ORGANIZED HEALTH CARE EDUCATION/TRAINING PROGRAM

## 2021-08-25 PROCEDURE — 250N000013 HC RX MED GY IP 250 OP 250 PS 637: Performed by: STUDENT IN AN ORGANIZED HEALTH CARE EDUCATION/TRAINING PROGRAM

## 2021-08-25 PROCEDURE — 96360 HYDRATION IV INFUSION INIT: CPT

## 2021-08-25 RX ORDER — LIDOCAINE 40 MG/G
CREAM TOPICAL
Status: DISCONTINUED | OUTPATIENT
Start: 2021-08-25 | End: 2021-08-25 | Stop reason: HOSPADM

## 2021-08-25 RX ORDER — DIPHENOXYLATE HCL/ATROPINE 2.5-.025MG
1 TABLET ORAL ONCE
Status: COMPLETED | OUTPATIENT
Start: 2021-08-25 | End: 2021-08-25

## 2021-08-25 RX ADMIN — DIPHENOXYLATE HYDROCHLORIDE AND ATROPINE SULFATE 1 TABLET: 2.5; .025 TABLET ORAL at 13:58

## 2021-08-25 RX ADMIN — SODIUM CHLORIDE, POTASSIUM CHLORIDE, SODIUM LACTATE AND CALCIUM CHLORIDE 1000 ML: 600; 310; 30; 20 INJECTION, SOLUTION INTRAVENOUS at 13:55

## 2021-08-25 NOTE — PROGRESS NOTES
Pt presents here from home with 9 stools belly cramping and chills. since 2330 last night.. Significant other has diarrhea also since this morning. Urine sent. [facility] on. H & P obtained. Saw Dr Desai last Thursday.

## 2021-08-25 NOTE — PROGRESS NOTES
1515 Dr Guardado here to see pt. May discharge to home. Keep appointment for tomorrow.   NS DISCHARGE NOTE    Patient discharged to home at 1540PM via ambulation. Accompanied by spouse and staff. Discharge instructions reviewed with patient, opportunity offered to ask questions. Prescriptions - None ordered for discharge. All belongings sent with patient.    Nickie Michel RN

## 2021-08-25 NOTE — PROGRESS NOTES
Return OB Visit    S: Ms. Dorantes is feeling well today. She has no acute concerns. Denies leaking of fluid, vaginal bleeding, painful contractions. Notes fetal movements. She had recent diarrhea after a bad meal and had fluids in OB triage yesterday. Her significant other also experienced GI upset after eating this as well.     O:   /78   Pulse 90   Wt 104.7 kg (230 lb 14.4 oz)   LMP 2020   BMI 42.23 kg/m      Gen: Well-appearing, NAD  See OB Flowsheet    FH 36 cm  -150 bpm  SVE: closed-1, checked yesterday in OB unit  Bedside US cephalic    Assessment:  Ms. Clotilde Dorantes is a 20 year old yo  here for an OB follow up visit. She is currently 37w6d. This pregnancy is complicated by MTHFR mutation and depression.     Plan:  # Routine Prenatal Care  -- Dating: LMP= 8w US JASPREET: 9/10/2021  -- PNLs:               A+, Ab screen neg              RPR nr              Hep B S Ag neg              Rubella Immune              HIV neg              GC/Chlam neg     -- Genetic Screening: [place] low risk: male, SMA low risk, CF low risk  -- Anatomy US: : 50%ile, unable to see profile and RVOT              : 46%ile,   -- Immunizations: Tdap declined  -- 3rd TM labs including CBC, RPR: Hgb 10.2, RPR nr  -- 1 hr GTT: 132  -- GBS: Positive  -- Postpartum Planning: Breastfeeding  -- Delivery Planning: No indication for early IOL at this time. To be discussed continually  -- Return to clinic in 1 week for OB follow up visit  -- Planning to do at next visit: SVE, check position with bedside US     # Anemia in second trimester  -- Hgb 10.2, taking iron     # MTHFR mutation  -- no anticoagulation needed in pregnancy or postpartum period     # Depression  -- currently stable  -- Plan for resuming medications postpartum

## 2021-08-26 ENCOUNTER — PRENATAL OFFICE VISIT (OUTPATIENT)
Dept: OBGYN | Facility: OTHER | Age: 21
End: 2021-08-26
Attending: STUDENT IN AN ORGANIZED HEALTH CARE EDUCATION/TRAINING PROGRAM
Payer: COMMERCIAL

## 2021-08-26 VITALS
HEART RATE: 90 BPM | SYSTOLIC BLOOD PRESSURE: 122 MMHG | DIASTOLIC BLOOD PRESSURE: 78 MMHG | BODY MASS INDEX: 42.23 KG/M2 | WEIGHT: 230.9 LBS

## 2021-08-26 DIAGNOSIS — O99.013 ANEMIA AFFECTING PREGNANCY IN THIRD TRIMESTER: Primary | ICD-10-CM

## 2021-08-26 LAB — BACTERIA UR CULT: NORMAL

## 2021-08-26 PROCEDURE — 99207 PR OB VISIT-NO CHARGE - GICH ONLY: CPT | Performed by: STUDENT IN AN ORGANIZED HEALTH CARE EDUCATION/TRAINING PROGRAM

## 2021-08-26 ASSESSMENT — PAIN SCALES - GENERAL: PAINLEVEL: NO PAIN (0)

## 2021-08-26 NOTE — NURSING NOTE
Pt presents to clinic today for prenatal care 37w6d. Pt denies any bleeding, or leakage of fluid at this time. States baby is moving good and has noticed some contractions.      Medication Reconciliation: complete  Alayna Crump LPN

## 2021-09-02 ENCOUNTER — PRENATAL OFFICE VISIT (OUTPATIENT)
Dept: OBGYN | Facility: OTHER | Age: 21
End: 2021-09-02
Attending: STUDENT IN AN ORGANIZED HEALTH CARE EDUCATION/TRAINING PROGRAM
Payer: COMMERCIAL

## 2021-09-02 VITALS
WEIGHT: 234.4 LBS | BODY MASS INDEX: 42.87 KG/M2 | HEART RATE: 94 BPM | SYSTOLIC BLOOD PRESSURE: 130 MMHG | DIASTOLIC BLOOD PRESSURE: 84 MMHG

## 2021-09-02 DIAGNOSIS — Z15.89 MTHFR MUTATION: ICD-10-CM

## 2021-09-02 DIAGNOSIS — Z34.93 ENCOUNTER FOR PREGNANCY RELATED EXAMINATION IN THIRD TRIMESTER: ICD-10-CM

## 2021-09-02 DIAGNOSIS — O99.013 ANEMIA AFFECTING PREGNANCY IN THIRD TRIMESTER: Primary | ICD-10-CM

## 2021-09-02 PROCEDURE — 99207 PR OB VISIT-NO CHARGE - GICH ONLY: CPT | Performed by: STUDENT IN AN ORGANIZED HEALTH CARE EDUCATION/TRAINING PROGRAM

## 2021-09-02 ASSESSMENT — PAIN SCALES - GENERAL: PAINLEVEL: NO PAIN (0)

## 2021-09-02 NOTE — NURSING NOTE
Pt presents to clinic today for prenatal care 38w6d. Pt denies any contractions, bleeding, or leakage of fluid at this time. States baby is moving good.      Medication Reconciliation: complete  Alayna Crump LPN

## 2021-09-02 NOTE — PROGRESS NOTES
Return OB Visit    S: Ms. Dorantes is feeling well today. She has no acute concerns. Denies leaking of fluid, vaginal bleeding, painful contractions. Notes fetal movements.    O:  /84   Pulse 94   Wt 106.3 kg (234 lb 6.4 oz)   LMP 2020   BMI 42.87 kg/m      Gen: Well-appearing, NAD  See OB Flowsheet    FH 38 cm   bpm  SVE: closed/thick/high  Bedside US: cephalic    Assessment:  Ms. Clotilde Dorantes is a 20 year old yo  here for an OB follow up visit. She is currently 38w6d. This pregnancy is complicated by MTHFR mutation and depression.     Plan:  # Routine Prenatal Care  -- Dating: LMP= 8w US JASPREET: 9/10/2021  -- PNLs:               A+, Ab screen neg              RPR nr              Hep B S Ag neg              Rubella Immune              HIV neg              GC/Chlam neg     -- Genetic Screening: [place] low risk: male, SMA low risk, CF low risk  -- Anatomy US: : 50%ile, unable to see profile and RVOT              : 46%ile,   -- Immunizations: Tdap declined  -- 3rd TM labs including CBC, RPR: Hgb 10.2, RPR nr  -- 1 hr GTT: 132  -- GBS: Positive  -- Postpartum Planning: Breastfeeding  -- Delivery Planning: No indication for early IOL at this time. To be discussed continually  -- Return to clinic in 1 week for OB follow up visit  -- Planning to do at next visit: BRAD, plan IOL/start paperwork (tentatively ? She will be 41w3d)     # Anemia in second trimester  -- Hgb 10.2, taking iron     # MTHFR mutation  -- no anticoagulation needed in pregnancy or postpartum period     # Depression  -- currently stable  -- Plan for resuming medications postpartum

## 2021-09-08 ENCOUNTER — PRENATAL OFFICE VISIT (OUTPATIENT)
Dept: OBGYN | Facility: OTHER | Age: 21
End: 2021-09-08
Attending: STUDENT IN AN ORGANIZED HEALTH CARE EDUCATION/TRAINING PROGRAM
Payer: COMMERCIAL

## 2021-09-08 VITALS
DIASTOLIC BLOOD PRESSURE: 84 MMHG | SYSTOLIC BLOOD PRESSURE: 128 MMHG | HEART RATE: 96 BPM | WEIGHT: 234.3 LBS | BODY MASS INDEX: 42.85 KG/M2

## 2021-09-08 DIAGNOSIS — Z15.89 MTHFR MUTATION: ICD-10-CM

## 2021-09-08 DIAGNOSIS — O99.013 ANEMIA AFFECTING PREGNANCY IN THIRD TRIMESTER: ICD-10-CM

## 2021-09-08 DIAGNOSIS — Z01.812 PRE-PROCEDURE LAB EXAM: Primary | ICD-10-CM

## 2021-09-08 PROCEDURE — 99207 PR OB VISIT-NO CHARGE - GICH ONLY: CPT | Performed by: STUDENT IN AN ORGANIZED HEALTH CARE EDUCATION/TRAINING PROGRAM

## 2021-09-08 ASSESSMENT — PAIN SCALES - GENERAL: PAINLEVEL: NO PAIN (0)

## 2021-09-08 NOTE — NURSING NOTE
"  Chief Complaint   Patient presents with     Prenatal Care     39w5d       Initial /84 (BP Location: Right arm, Patient Position: Sitting, Cuff Size: Adult Large)   Pulse 96   Wt 106.3 kg (234 lb 4.8 oz)   LMP 12/04/2020   Breastfeeding No   BMI 42.85 kg/m   Estimated body mass index is 42.85 kg/m  as calculated from the following:    Height as of 4/15/21: 1.575 m (5' 2\").    Weight as of this encounter: 106.3 kg (234 lb 4.8 oz).  Medication Reconciliation: complete    Analy Chan RN  "

## 2021-09-08 NOTE — PROGRESS NOTES
Return OB Visit    S: Ms. Dorantes is feeling well today. She has no acute concerns. Denies leaking of fluid, vaginal bleeding, painful contractions. Notes fetal movements.    O: /84 (BP Location: Right arm, Patient Position: Sitting, Cuff Size: Adult Large)   Pulse 96   Wt 106.3 kg (234 lb 4.8 oz)   LMP 2020   Breastfeeding No   BMI 42.85 kg/m    Gen: Well-appearing, NAD  See OB Flowsheet    FH 39 cm   bpm  SVE: closed/thick/high  Bedside US: cephalic    Assessment:  Ms. Clotilde Dorantes is a 20 year old yo  here for an OB follow up visit. She is currently 39w5d. This pregnancy is complicated by MTHFR mutation and depression.     Plan:  # Routine Prenatal Care  -- Dating: LMP= 8w US JASPREET: 9/10/2021  -- PNLs:               A+, Ab screen neg              RPR nr              Hep B S Ag neg              Rubella Immune              HIV neg              GC/Chlam neg     -- Genetic Screening: [place] low risk: male, SMA low risk, CF low risk  -- Anatomy US: : 50%ile, unable to see profile and RVOT              : 46%ile,   -- Immunizations: Tdap declined  -- 3rd TM labs including CBC, RPR: Hgb 10.2, RPR nr  -- 1 hr GTT: 132  -- GBS: Positive  -- Postpartum Planning: Breastfeeding  -- Delivery Planning: IOL  5:30 am She will be 41w3d  -- Return to clinic in 1 week for OB follow up visit  -- Planning to do at next visit: BRAD, IOL  5:30 am She will be 41w3d     # Anemia in second trimester  -- Hgb 10.2, taking iron     # MTHFR mutation  -- no anticoagulation needed in pregnancy or postpartum period     # Depression  -- currently stable  -- Plan for resuming medications postpartum       # Obesity  -- Pre-gravid BMI 38  -- Weight gain this pregnancy 95kg-->106kg         Leslie Desai MD  OB/GYN  2021 10:57 AM

## 2021-09-14 ENCOUNTER — HOSPITAL ENCOUNTER (OUTPATIENT)
Facility: OTHER | Age: 21
Discharge: HOME | End: 2021-09-14
Attending: OBSTETRICS & GYNECOLOGY | Admitting: OBSTETRICS & GYNECOLOGY
Payer: COMMERCIAL

## 2021-09-14 ENCOUNTER — PRENATAL OFFICE VISIT (OUTPATIENT)
Dept: OBGYN | Facility: OTHER | Age: 21
End: 2021-09-14
Attending: STUDENT IN AN ORGANIZED HEALTH CARE EDUCATION/TRAINING PROGRAM
Payer: COMMERCIAL

## 2021-09-14 VITALS
RESPIRATION RATE: 16 BRPM | SYSTOLIC BLOOD PRESSURE: 134 MMHG | DIASTOLIC BLOOD PRESSURE: 80 MMHG | HEART RATE: 91 BPM | OXYGEN SATURATION: 99 % | TEMPERATURE: 98.7 F

## 2021-09-14 VITALS
BODY MASS INDEX: 43.16 KG/M2 | WEIGHT: 236 LBS | DIASTOLIC BLOOD PRESSURE: 78 MMHG | SYSTOLIC BLOOD PRESSURE: 126 MMHG | HEART RATE: 88 BPM

## 2021-09-14 DIAGNOSIS — Z34.03 SUPERVISION OF NORMAL FIRST TEEN PREGNANCY IN THIRD TRIMESTER: Primary | ICD-10-CM

## 2021-09-14 DIAGNOSIS — N89.8 VAGINAL DISCHARGE: ICD-10-CM

## 2021-09-14 PROCEDURE — G0463 HOSPITAL OUTPT CLINIC VISIT: HCPCS | Mod: 25

## 2021-09-14 PROCEDURE — 99207 PR OB VISIT-NO CHARGE - GICH ONLY: CPT | Performed by: OBSTETRICS & GYNECOLOGY

## 2021-09-14 PROCEDURE — 99213 OFFICE O/P EST LOW 20 MIN: CPT | Mod: 25 | Performed by: OBSTETRICS & GYNECOLOGY

## 2021-09-14 PROCEDURE — G0463 HOSPITAL OUTPT CLINIC VISIT: HCPCS

## 2021-09-14 PROCEDURE — 59025 FETAL NON-STRESS TEST: CPT | Performed by: OBSTETRICS & GYNECOLOGY

## 2021-09-14 RX ORDER — LIDOCAINE 40 MG/G
CREAM TOPICAL
Status: DISCONTINUED | OUTPATIENT
Start: 2021-09-14 | End: 2021-09-14 | Stop reason: HOSPADM

## 2021-09-14 ASSESSMENT — PAIN SCALES - GENERAL: PAINLEVEL: NO PAIN (0)

## 2021-09-14 NOTE — PROGRESS NOTES
Obstetrics Triage Note    HPI:  Clotilde Dorantes is a 21 year old  female at 40w4d by LMP 8w0d US, here with complaints of vaginal bleeding.      Patient states that after her appointment today, she had bright red bleeding that was more than just spotting.  It has subsequently resolved. She had her membranes stripped in clinic. She is feeling some ctx that are uncomfortable but not painful.  + FM.    ROS:  Negative except as mentioned in HPI.    PMH:  Past Medical History:   Diagnosis Date     MTHFR gene mutation (H)        PSHx:  Past Surgical History:   Procedure Laterality Date     wisdom teeth         Medications:  No current facility-administered medications on file prior to encounter.  ferrous sulfate (FEROSUL) 325 (65 Fe) MG tablet, Take 1 tablet (325 mg) by mouth daily (with breakfast)  Prenatal Vit-Fe Fumarate-FA (PRENATAL MULTIVITAMIN W/IRON) 27-0.8 MG tablet, Take 1 tablet by mouth daily         Allergies:     Allergies   Allergen Reactions     Tramadol Other (See Comments)     Ankle swelling and pain       Physical Exam:   Vitals:    21 1645   BP: 134/80   BP Location: Right arm   Pulse: 91   Resp: 16   Temp: 98.7  F (37.1  C)   TempSrc: Temporal   SpO2: 99%      Gen: resting comfortably, in NAD  Resp: nonlabored  Abd: soft, gravid, non-tender, non-distended  Cx: 3/70/-2, unchanged from clinic exam    NST:  FHT: , mod eduardo, + accels, no decels  [place]: 2 in 10 min    Assessment/Plan: Clotilde Dorantes is a 21 year old  at 40w4d by LMP c/w 8w0d US, here for vaginal bleeding. Suspect due to her cervical exam with membrane sweeping in clinic this afternoon. Now resolved  - Dispo: to home with follow up in the next week. Discussed tylenol for pain as needed. Discussed labor warning signs and indication to return to care.    Claudia Cardenas MD  OBGYN  2021 6:30 PM

## 2021-09-14 NOTE — PROGRESS NOTES
Return OB Visit    S: Patient is uncomfortable. Some cramping but nothing regular. No VB. Had wet underwear when she first got up this morning, not since. No large gush. +FM    O: /78 (BP Location: Right arm, Patient Position: Sitting, Cuff Size: Adult Large)   Pulse 88   Wt 107 kg (236 lb)   LMP 2020   Breastfeeding No   BMI 43.16 kg/m    Gen: Well-appearing, NAD  See OB Flowsheet    NST: 120, mod variability, + accels, no decels.  [place]: quiet    A/P:  Clotilde Dorantes is a 21 year old  at 40w4d by LMP c/w 8w0d US, here for return OB visit.  MTHFR mutation: does not need anticoagulation during pregnancy or postpartum.   Depression: no meds, currently stable. Plan to restart PP  Anemia: on iron  Plans breastfeeding, no epidural  Undecided on contraception, reviewed options     PNC: Rh positive, Rubella immune, , GBS positive  Genetics: normal CF and SMA screen, and [place]  Imaging: dating US at 8w0d, normal anatomy survey after follow up  Immunizations: none  IOL scheduled for  for late-term gestation. NST completed today due to late term, continued expectant management    Claudia Cardenas MD  OB/GYN  2021 1:54 PM

## 2021-09-14 NOTE — PROGRESS NOTES
Dr. AMANDA Cardenas reports that cervical check is unchanged from in clinic earlier today. Ordered to discharge pt home.  Carolee Murillo RN on 9/14/2021 at 6:37 PM

## 2021-09-14 NOTE — NURSING NOTE
FOOD SECURITY SCREENING QUESTIONS  Hunger Vital Signs:  Within the past 12 months we worried whether our food would run out before we got money to buy more. Never  Within the past 12 months the food we bought just didn't last and we didn't have money to get more. Never  Huong Davis LPN 9/14/2021 1:53 PM    Chief Complaint   Patient presents with     Prenatal Care     40w4d     Offers no complaints  Huong Davis LPN........................9/14/2021  1:53 PM     Medication Reconciliation: completed   Huong Davis LPN  9/14/2021 1:53 PM

## 2021-09-14 NOTE — DISCHARGE INSTRUCTIONS
Return to [facility] for the following:    Headaches and blurred vision    Uterine contractions that are consistent and of increased intensity    Sharp abdominal pain that does not go away    Decreased fetal movement    Vaginal bleeding    Leaking of amniotic fluid    Call if any questions or concerns: [phone]

## 2021-09-14 NOTE — PROGRESS NOTES
pt here reporting vaginal bleeding and contractions. Pt states she membranes were striped today in clinic and she started having bright red bleeding. Bleeding currently light pink. Pt placed on EFM/EUM. On-call MD notified.

## 2021-09-15 NOTE — PROGRESS NOTES
[place] NSG DISCHARGE NOTE    Patient discharged to home at 7:00 PM via ambulation. Accompanied by significant other and staff. Discharge instructions reviewed with patient and spouse, opportunity offered to ask questions. Prescriptions - None ordered for discharge. All belongings sent with patient.    Carolee Murillo RN

## 2021-09-16 ENCOUNTER — ANESTHESIA EVENT (OUTPATIENT)
Dept: OBGYN | Facility: OTHER | Age: 21
End: 2021-09-16
Payer: COMMERCIAL

## 2021-09-16 ENCOUNTER — HOSPITAL ENCOUNTER (INPATIENT)
Facility: OTHER | Age: 21
LOS: 2 days | Discharge: HOME | End: 2021-09-18
Attending: STUDENT IN AN ORGANIZED HEALTH CARE EDUCATION/TRAINING PROGRAM | Admitting: OBSTETRICS & GYNECOLOGY
Payer: COMMERCIAL

## 2021-09-16 ENCOUNTER — TELEPHONE (OUTPATIENT)
Dept: OBGYN | Facility: OTHER | Age: 21
End: 2021-09-16

## 2021-09-16 ENCOUNTER — ANESTHESIA (OUTPATIENT)
Dept: OBGYN | Facility: OTHER | Age: 21
End: 2021-09-16
Payer: COMMERCIAL

## 2021-09-16 PROBLEM — O42.90 RUPTURE OF MEMBRANES WITH DELAY OF DELIVERY: Status: ACTIVE | Noted: 2021-09-16

## 2021-09-16 LAB
A1 MICROGLOB PLACENTAL VAG QL: POSITIVE
ABO/RH(D): NORMAL
ANTIBODY SCREEN: NEGATIVE
BASOPHILS # BLD AUTO: 0 10E3/UL (ref 0–0.2)
BASOPHILS NFR BLD AUTO: 0 %
EOSINOPHIL # BLD AUTO: 0.2 10E3/UL (ref 0–0.7)
EOSINOPHIL NFR BLD AUTO: 2 %
ERYTHROCYTE [DISTWIDTH] IN BLOOD BY AUTOMATED COUNT: 18 % (ref 10–15)
HCT VFR BLD AUTO: 32.8 % (ref 35–47)
HGB BLD-MCNC: 10.6 G/DL (ref 11.7–15.7)
IMM GRANULOCYTES # BLD: 0.1 10E3/UL
IMM GRANULOCYTES NFR BLD: 1 %
LYMPHOCYTES # BLD AUTO: 1.8 10E3/UL (ref 0.8–5.3)
LYMPHOCYTES NFR BLD AUTO: 13 %
MCH RBC QN AUTO: 25.9 PG (ref 26.5–33)
MCHC RBC AUTO-ENTMCNC: 32.3 G/DL (ref 31.5–36.5)
MCV RBC AUTO: 80 FL (ref 78–100)
MONOCYTES # BLD AUTO: 0.6 10E3/UL (ref 0–1.3)
MONOCYTES NFR BLD AUTO: 5 %
NEUTROPHILS # BLD AUTO: 10.4 10E3/UL (ref 1.6–8.3)
NEUTROPHILS NFR BLD AUTO: 79 %
NRBC # BLD AUTO: 0 10E3/UL
NRBC BLD AUTO-RTO: 0 /100
PLATELET # BLD AUTO: 283 10E3/UL (ref 150–450)
RBC # BLD AUTO: 4.1 10E6/UL (ref 3.8–5.2)
SARS-COV-2 RNA RESP QL NAA+PROBE: NEGATIVE
SPECIMEN EXPIRATION DATE: NORMAL
T PALLIDUM AB SER QL: NONREACTIVE
WBC # BLD AUTO: 13.1 10E3/UL (ref 4–11)

## 2021-09-16 PROCEDURE — 722N000001 HC LABOR CARE VAGINAL DELIVERY SINGLE

## 2021-09-16 PROCEDURE — 250N000011 HC RX IP 250 OP 636: Performed by: NURSE ANESTHETIST, CERTIFIED REGISTERED

## 2021-09-16 PROCEDURE — 370N000003 HC ANESTHESIA WARD SERVICE

## 2021-09-16 PROCEDURE — 258N000003 HC RX IP 258 OP 636: Performed by: NURSE ANESTHETIST, CERTIFIED REGISTERED

## 2021-09-16 PROCEDURE — 0UQGXZZ REPAIR VAGINA, EXTERNAL APPROACH: ICD-10-PCS | Performed by: OBSTETRICS & GYNECOLOGY

## 2021-09-16 PROCEDURE — 84112 EVAL AMNIOTIC FLUID PROTEIN: CPT | Performed by: OBSTETRICS & GYNECOLOGY

## 2021-09-16 PROCEDURE — 10H073Z INSERTION OF MONITORING ELECTRODE INTO PRODUCTS OF CONCEPTION, VIA NATURAL OR ARTIFICIAL OPENING: ICD-10-PCS | Performed by: OBSTETRICS & GYNECOLOGY

## 2021-09-16 PROCEDURE — 59400 OBSTETRICAL CARE: CPT | Performed by: OBSTETRICS & GYNECOLOGY

## 2021-09-16 PROCEDURE — 85025 COMPLETE CBC W/AUTO DIFF WBC: CPT | Performed by: OBSTETRICS & GYNECOLOGY

## 2021-09-16 PROCEDURE — 120N000001 HC R&B MED SURG/OB

## 2021-09-16 PROCEDURE — 258N000003 HC RX IP 258 OP 636: Performed by: OBSTETRICS & GYNECOLOGY

## 2021-09-16 PROCEDURE — 10H07YZ INSERTION OF OTHER DEVICE INTO PRODUCTS OF CONCEPTION, VIA NATURAL OR ARTIFICIAL OPENING: ICD-10-PCS | Performed by: OBSTETRICS & GYNECOLOGY

## 2021-09-16 PROCEDURE — G0463 HOSPITAL OUTPT CLINIC VISIT: HCPCS | Mod: 25

## 2021-09-16 PROCEDURE — U0003 INFECTIOUS AGENT DETECTION BY NUCLEIC ACID (DNA OR RNA); SEVERE ACUTE RESPIRATORY SYNDROME CORONAVIRUS 2 (SARS-COV-2) (CORONAVIRUS DISEASE [COVID-19]), AMPLIFIED PROBE TECHNIQUE, MAKING USE OF HIGH THROUGHPUT TECHNOLOGIES AS DESCRIBED BY CMS-2020-01-R: HCPCS | Performed by: OBSTETRICS & GYNECOLOGY

## 2021-09-16 PROCEDURE — 86901 BLOOD TYPING SEROLOGIC RH(D): CPT | Performed by: OBSTETRICS & GYNECOLOGY

## 2021-09-16 PROCEDURE — 86780 TREPONEMA PALLIDUM: CPT | Performed by: OBSTETRICS & GYNECOLOGY

## 2021-09-16 PROCEDURE — 250N000011 HC RX IP 250 OP 636: Performed by: OBSTETRICS & GYNECOLOGY

## 2021-09-16 PROCEDURE — 250N000009 HC RX 250: Performed by: OBSTETRICS & GYNECOLOGY

## 2021-09-16 PROCEDURE — 0UQMXZZ REPAIR VULVA, EXTERNAL APPROACH: ICD-10-PCS | Performed by: OBSTETRICS & GYNECOLOGY

## 2021-09-16 PROCEDURE — 36415 COLL VENOUS BLD VENIPUNCTURE: CPT | Performed by: OBSTETRICS & GYNECOLOGY

## 2021-09-16 PROCEDURE — 250N000009 HC RX 250: Performed by: NURSE ANESTHETIST, CERTIFIED REGISTERED

## 2021-09-16 RX ORDER — TRANEXAMIC ACID 10 MG/ML
1 INJECTION, SOLUTION INTRAVENOUS EVERY 30 MIN PRN
Status: DISCONTINUED | OUTPATIENT
Start: 2021-09-16 | End: 2021-09-18 | Stop reason: HOSPADM

## 2021-09-16 RX ORDER — OXYTOCIN/0.9 % SODIUM CHLORIDE 30/500 ML
340 PLASTIC BAG, INJECTION (ML) INTRAVENOUS CONTINUOUS PRN
Status: DISCONTINUED | OUTPATIENT
Start: 2021-09-16 | End: 2021-09-18 | Stop reason: HOSPADM

## 2021-09-16 RX ORDER — ONDANSETRON 4 MG/1
4 TABLET, ORALLY DISINTEGRATING ORAL EVERY 6 HOURS PRN
Status: DISCONTINUED | OUTPATIENT
Start: 2021-09-16 | End: 2021-09-16 | Stop reason: HOSPADM

## 2021-09-16 RX ORDER — ONDANSETRON 2 MG/ML
4 INJECTION INTRAMUSCULAR; INTRAVENOUS EVERY 6 HOURS PRN
Status: DISCONTINUED | OUTPATIENT
Start: 2021-09-16 | End: 2021-09-16 | Stop reason: HOSPADM

## 2021-09-16 RX ORDER — MODIFIED LANOLIN
OINTMENT (GRAM) TOPICAL
Status: DISCONTINUED | OUTPATIENT
Start: 2021-09-16 | End: 2021-09-18 | Stop reason: HOSPADM

## 2021-09-16 RX ORDER — MISOPROSTOL 100 UG/1
400 TABLET ORAL
Status: DISCONTINUED | OUTPATIENT
Start: 2021-09-16 | End: 2021-09-18 | Stop reason: HOSPADM

## 2021-09-16 RX ORDER — DIPHENHYDRAMINE HCL 25 MG
25 CAPSULE ORAL EVERY 6 HOURS PRN
Status: DISCONTINUED | OUTPATIENT
Start: 2021-09-16 | End: 2021-09-17

## 2021-09-16 RX ORDER — NALOXONE HYDROCHLORIDE 0.4 MG/ML
0.2 INJECTION, SOLUTION INTRAMUSCULAR; INTRAVENOUS; SUBCUTANEOUS
Status: DISCONTINUED | OUTPATIENT
Start: 2021-09-16 | End: 2021-09-16 | Stop reason: HOSPADM

## 2021-09-16 RX ORDER — NALOXONE HYDROCHLORIDE 0.4 MG/ML
0.4 INJECTION, SOLUTION INTRAMUSCULAR; INTRAVENOUS; SUBCUTANEOUS
Status: DISCONTINUED | OUTPATIENT
Start: 2021-09-16 | End: 2021-09-16 | Stop reason: HOSPADM

## 2021-09-16 RX ORDER — HYDROCORTISONE 2.5 %
CREAM (GRAM) TOPICAL 3 TIMES DAILY PRN
Status: DISCONTINUED | OUTPATIENT
Start: 2021-09-16 | End: 2021-09-18 | Stop reason: HOSPADM

## 2021-09-16 RX ORDER — METOCLOPRAMIDE HYDROCHLORIDE 5 MG/ML
10 INJECTION INTRAMUSCULAR; INTRAVENOUS EVERY 6 HOURS PRN
Status: DISCONTINUED | OUTPATIENT
Start: 2021-09-16 | End: 2021-09-16 | Stop reason: HOSPADM

## 2021-09-16 RX ORDER — LIDOCAINE 40 MG/G
CREAM TOPICAL
Status: DISCONTINUED | OUTPATIENT
Start: 2021-09-16 | End: 2021-09-16 | Stop reason: HOSPADM

## 2021-09-16 RX ORDER — METHYLERGONOVINE MALEATE 0.2 MG/ML
200 INJECTION INTRAVENOUS
Status: DISCONTINUED | OUTPATIENT
Start: 2021-09-16 | End: 2021-09-18 | Stop reason: HOSPADM

## 2021-09-16 RX ORDER — METOCLOPRAMIDE 10 MG/1
10 TABLET ORAL EVERY 6 HOURS PRN
Status: DISCONTINUED | OUTPATIENT
Start: 2021-09-16 | End: 2021-09-16 | Stop reason: HOSPADM

## 2021-09-16 RX ORDER — OXYTOCIN/0.9 % SODIUM CHLORIDE 30/500 ML
340 PLASTIC BAG, INJECTION (ML) INTRAVENOUS CONTINUOUS PRN
Status: DISCONTINUED | OUTPATIENT
Start: 2021-09-16 | End: 2021-09-16 | Stop reason: HOSPADM

## 2021-09-16 RX ORDER — SODIUM CHLORIDE, SODIUM LACTATE, POTASSIUM CHLORIDE, CALCIUM CHLORIDE 600; 310; 30; 20 MG/100ML; MG/100ML; MG/100ML; MG/100ML
INJECTION, SOLUTION INTRAVENOUS CONTINUOUS
Status: DISCONTINUED | OUTPATIENT
Start: 2021-09-16 | End: 2021-09-16 | Stop reason: HOSPADM

## 2021-09-16 RX ORDER — BREAST PUMP
EACH MISCELLANEOUS
Qty: 1 EACH | Refills: 0 | Status: SHIPPED | OUTPATIENT
Start: 2021-09-16 | End: 2022-04-27

## 2021-09-16 RX ORDER — FENTANYL CITRATE-0.9 % NACL/PF 10 MCG/ML
100 PLASTIC BAG, INJECTION (ML) INTRAVENOUS EVERY 5 MIN PRN
Status: COMPLETED | OUTPATIENT
Start: 2021-09-16 | End: 2021-09-16

## 2021-09-16 RX ORDER — IBUPROFEN 400 MG/1
800 TABLET, FILM COATED ORAL EVERY 6 HOURS PRN
Status: DISCONTINUED | OUTPATIENT
Start: 2021-09-16 | End: 2021-09-18 | Stop reason: HOSPADM

## 2021-09-16 RX ORDER — BISACODYL 10 MG
10 SUPPOSITORY, RECTAL RECTAL DAILY PRN
Status: DISCONTINUED | OUTPATIENT
Start: 2021-09-16 | End: 2021-09-18 | Stop reason: HOSPADM

## 2021-09-16 RX ORDER — BUPIVACAINE HYDROCHLORIDE 2.5 MG/ML
10 INJECTION, SOLUTION EPIDURAL; INFILTRATION; INTRACAUDAL ONCE
Status: COMPLETED | OUTPATIENT
Start: 2021-09-16 | End: 2021-09-16

## 2021-09-16 RX ORDER — PROCHLORPERAZINE 25 MG
25 SUPPOSITORY, RECTAL RECTAL EVERY 12 HOURS PRN
Status: DISCONTINUED | OUTPATIENT
Start: 2021-09-16 | End: 2021-09-16 | Stop reason: HOSPADM

## 2021-09-16 RX ORDER — OXYTOCIN 10 [USP'U]/ML
10 INJECTION, SOLUTION INTRAMUSCULAR; INTRAVENOUS
Status: DISCONTINUED | OUTPATIENT
Start: 2021-09-16 | End: 2021-09-17

## 2021-09-16 RX ORDER — OXYTOCIN/0.9 % SODIUM CHLORIDE 30/500 ML
100-340 PLASTIC BAG, INJECTION (ML) INTRAVENOUS CONTINUOUS PRN
Status: DISCONTINUED | OUTPATIENT
Start: 2021-09-16 | End: 2021-09-17

## 2021-09-16 RX ORDER — DIPHENHYDRAMINE HYDROCHLORIDE 50 MG/ML
25 INJECTION INTRAMUSCULAR; INTRAVENOUS EVERY 6 HOURS PRN
Status: DISCONTINUED | OUTPATIENT
Start: 2021-09-16 | End: 2021-09-17

## 2021-09-16 RX ORDER — PROCHLORPERAZINE MALEATE 10 MG
10 TABLET ORAL EVERY 6 HOURS PRN
Status: DISCONTINUED | OUTPATIENT
Start: 2021-09-16 | End: 2021-09-16 | Stop reason: HOSPADM

## 2021-09-16 RX ORDER — PENICILLIN G 3000000 [IU]/50ML
3 INJECTION, SOLUTION INTRAVENOUS EVERY 4 HOURS
Status: DISCONTINUED | OUTPATIENT
Start: 2021-09-16 | End: 2021-09-16 | Stop reason: HOSPADM

## 2021-09-16 RX ORDER — OXYTOCIN 10 [USP'U]/ML
10 INJECTION, SOLUTION INTRAMUSCULAR; INTRAVENOUS
Status: DISCONTINUED | OUTPATIENT
Start: 2021-09-16 | End: 2021-09-16 | Stop reason: HOSPADM

## 2021-09-16 RX ORDER — OXYTOCIN 10 [USP'U]/ML
10 INJECTION, SOLUTION INTRAMUSCULAR; INTRAVENOUS
Status: DISCONTINUED | OUTPATIENT
Start: 2021-09-16 | End: 2021-09-18 | Stop reason: HOSPADM

## 2021-09-16 RX ORDER — LIDOCAINE HYDROCHLORIDE 10 MG/ML
INJECTION, SOLUTION INFILTRATION; PERINEURAL PRN
Status: DISCONTINUED | OUTPATIENT
Start: 2021-09-16 | End: 2021-09-16

## 2021-09-16 RX ORDER — ACETAMINOPHEN 325 MG/1
650 TABLET ORAL EVERY 4 HOURS PRN
Status: DISCONTINUED | OUTPATIENT
Start: 2021-09-16 | End: 2021-09-18 | Stop reason: HOSPADM

## 2021-09-16 RX ORDER — LIDOCAINE HYDROCHLORIDE AND EPINEPHRINE 15; 5 MG/ML; UG/ML
3 INJECTION, SOLUTION EPIDURAL
Status: COMPLETED | OUTPATIENT
Start: 2021-09-16 | End: 2021-09-16

## 2021-09-16 RX ORDER — KETOROLAC TROMETHAMINE 30 MG/ML
30 INJECTION, SOLUTION INTRAMUSCULAR; INTRAVENOUS
Status: DISCONTINUED | OUTPATIENT
Start: 2021-09-16 | End: 2021-09-17

## 2021-09-16 RX ORDER — CARBOPROST TROMETHAMINE 250 UG/ML
250 INJECTION, SOLUTION INTRAMUSCULAR
Status: DISCONTINUED | OUTPATIENT
Start: 2021-09-16 | End: 2021-09-18 | Stop reason: HOSPADM

## 2021-09-16 RX ORDER — FENTANYL CITRATE 50 UG/ML
INJECTION, SOLUTION INTRAMUSCULAR; INTRAVENOUS PRN
Status: DISCONTINUED | OUTPATIENT
Start: 2021-09-16 | End: 2021-09-16

## 2021-09-16 RX ORDER — TERBUTALINE SULFATE 1 MG/ML
0.25 INJECTION, SOLUTION SUBCUTANEOUS
Status: DISCONTINUED | OUTPATIENT
Start: 2021-09-16 | End: 2021-09-16 | Stop reason: HOSPADM

## 2021-09-16 RX ORDER — MISOPROSTOL 100 UG/1
400 TABLET ORAL
Status: DISCONTINUED | OUTPATIENT
Start: 2021-09-16 | End: 2021-09-16 | Stop reason: HOSPADM

## 2021-09-16 RX ORDER — TRANEXAMIC ACID 10 MG/ML
1 INJECTION, SOLUTION INTRAVENOUS EVERY 30 MIN PRN
Status: DISCONTINUED | OUTPATIENT
Start: 2021-09-16 | End: 2021-09-16 | Stop reason: HOSPADM

## 2021-09-16 RX ORDER — IBUPROFEN 600 MG/1
600 TABLET, FILM COATED ORAL
Status: DISCONTINUED | OUTPATIENT
Start: 2021-09-16 | End: 2021-09-17

## 2021-09-16 RX ORDER — OXYTOCIN/0.9 % SODIUM CHLORIDE 30/500 ML
1-24 PLASTIC BAG, INJECTION (ML) INTRAVENOUS CONTINUOUS
Status: DISCONTINUED | OUTPATIENT
Start: 2021-09-16 | End: 2021-09-16 | Stop reason: HOSPADM

## 2021-09-16 RX ORDER — METHYLERGONOVINE MALEATE 0.2 MG/ML
200 INJECTION INTRAVENOUS
Status: DISCONTINUED | OUTPATIENT
Start: 2021-09-16 | End: 2021-09-16 | Stop reason: HOSPADM

## 2021-09-16 RX ORDER — CARBOPROST TROMETHAMINE 250 UG/ML
250 INJECTION, SOLUTION INTRAMUSCULAR
Status: DISCONTINUED | OUTPATIENT
Start: 2021-09-16 | End: 2021-09-16 | Stop reason: HOSPADM

## 2021-09-16 RX ORDER — DOCUSATE SODIUM 100 MG/1
100 CAPSULE, LIQUID FILLED ORAL DAILY
Status: DISCONTINUED | OUTPATIENT
Start: 2021-09-16 | End: 2021-09-18 | Stop reason: HOSPADM

## 2021-09-16 RX ADMIN — PENICILLIN G POTASSIUM 5 MILLION UNITS: 5000000 POWDER, FOR SOLUTION INTRAMUSCULAR; INTRAPLEURAL; INTRATHECAL; INTRAVENOUS at 02:58

## 2021-09-16 RX ADMIN — SODIUM CHLORIDE, POTASSIUM CHLORIDE, SODIUM LACTATE AND CALCIUM CHLORIDE: 600; 310; 30; 20 INJECTION, SOLUTION INTRAVENOUS at 15:41

## 2021-09-16 RX ADMIN — Medication 10 ML/HR: at 05:00

## 2021-09-16 RX ADMIN — LIDOCAINE HYDROCHLORIDE AND EPINEPHRINE 4 ML: 15; 5 INJECTION, SOLUTION EPIDURAL at 04:48

## 2021-09-16 RX ADMIN — DEXTROSE MONOHYDRATE 3 MILLION UNITS: 50 INJECTION, SOLUTION INTRAVENOUS at 15:39

## 2021-09-16 RX ADMIN — LIDOCAINE HYDROCHLORIDE 20 ML: 10 INJECTION, SOLUTION INFILTRATION; PERINEURAL at 19:30

## 2021-09-16 RX ADMIN — LIDOCAINE HYDROCHLORIDE 5 ML: 10 INJECTION, SOLUTION INFILTRATION; PERINEURAL at 04:45

## 2021-09-16 RX ADMIN — FENTANYL CITRATE 100 MCG: 50 INJECTION, SOLUTION INTRAMUSCULAR; INTRAVENOUS at 15:01

## 2021-09-16 RX ADMIN — BUPIVACAINE HYDROCHLORIDE 8 ML: 2.5 INJECTION, SOLUTION EPIDURAL; INFILTRATION; INTRACAUDAL; PERINEURAL at 04:56

## 2021-09-16 RX ADMIN — Medication 2 MILLI-UNITS/MIN: at 11:35

## 2021-09-16 RX ADMIN — Medication 100 MCG: at 06:09

## 2021-09-16 RX ADMIN — DEXTROSE MONOHYDRATE 3 MILLION UNITS: 50 INJECTION, SOLUTION INTRAVENOUS at 12:01

## 2021-09-16 RX ADMIN — DEXTROSE MONOHYDRATE 3 MILLION UNITS: 50 INJECTION, SOLUTION INTRAVENOUS at 07:41

## 2021-09-16 RX ADMIN — DEXTROSE MONOHYDRATE 3 MILLION UNITS: 50 INJECTION, SOLUTION INTRAVENOUS at 19:06

## 2021-09-16 RX ADMIN — SODIUM CHLORIDE, POTASSIUM CHLORIDE, SODIUM LACTATE AND CALCIUM CHLORIDE 25 ML: 600; 310; 30; 20 INJECTION, SOLUTION INTRAVENOUS at 02:57

## 2021-09-16 RX ADMIN — METHYLERGONOVINE MALEATE 200 MCG: 0.2 INJECTION INTRAVENOUS at 19:32

## 2021-09-16 RX ADMIN — SODIUM CHLORIDE, POTASSIUM CHLORIDE, SODIUM LACTATE AND CALCIUM CHLORIDE: 600; 310; 30; 20 INJECTION, SOLUTION INTRAVENOUS at 11:59

## 2021-09-16 RX ADMIN — Medication 2 MILLI-UNITS/MIN: at 09:35

## 2021-09-16 RX ADMIN — Medication 100 MCG: at 05:34

## 2021-09-16 RX ADMIN — Medication 100 MCG: at 06:32

## 2021-09-16 RX ADMIN — FENTANYL CITRATE 100 MCG: 50 INJECTION, SOLUTION INTRAMUSCULAR; INTRAVENOUS at 17:24

## 2021-09-16 RX ADMIN — SODIUM CHLORIDE, POTASSIUM CHLORIDE, SODIUM LACTATE AND CALCIUM CHLORIDE: 600; 310; 30; 20 INJECTION, SOLUTION INTRAVENOUS at 10:27

## 2021-09-16 RX ADMIN — SODIUM CHLORIDE, POTASSIUM CHLORIDE, SODIUM LACTATE AND CALCIUM CHLORIDE: 600; 310; 30; 20 INJECTION, SOLUTION INTRAVENOUS at 07:41

## 2021-09-16 RX ADMIN — Medication: at 12:53

## 2021-09-16 RX ADMIN — Medication 100 MCG: at 06:14

## 2021-09-16 NOTE — PROGRESS NOTES
Patient arrived to [facility] with sunny Mix. Patient c/o SROM at 0117, amnisure collected. EUM/EFM applied.

## 2021-09-16 NOTE — PROGRESS NOTES
Call placed to Dr. Wilder about pt's arrival/status. Amnisure positive, GBS positive. Orders obtained. Category I tracing noted with occasional contraction. Will continue to monitor and provide interventions as needed.

## 2021-09-16 NOTE — H&P
[facility] Labor and Delivery History and Physical    Coltilde Dorantes MRN# [id number]   Age: 21 year old YOB: 2000     Date of Admission:  2021    Primary care provider: Germán Menendez           Chief Complaint:   Clotilde Dorantes is a 21 year old  at 40w6d by LMP c/w 8w0d US admitted for SROM. Reports her water broke around 0115, clear fluid. She is now comfortable with epidural          Pregnancy history:     OBSTETRIC HISTORY:    OB History    Para Term  AB Living   1 0 0 0 0 0   SAB TAB Ectopic Multiple Live Births   0 0 0 0 0      # Outcome Date GA Lbr Jerome/2nd Weight Sex Delivery Anes PTL Lv   1 Current                EDC: Estimated Date of Delivery: Sep 10, 2021    Prenatal Labs:   Lab Results   Component Value Date    ABO A 2021    RH Pos 2021    AS Negative 2021    HEPBANG Nonreactive 2021    HGB 10.6 (L) 2021       GBS Status: positive    Active Problem List  Patient Active Problem List   Diagnosis     Encounter for triage in pregnant patient     Rupture of membranes with delay of delivery       Medication Prior to Admission  Medications Prior to Admission   Medication Sig Dispense Refill Last Dose     ferrous sulfate (FEROSUL) 325 (65 Fe) MG tablet Take 1 tablet (325 mg) by mouth daily (with breakfast) 60 tablet 3 9/15/2021 at Unknown time     Prenatal Vit-Fe Fumarate-FA (PRENATAL MULTIVITAMIN W/IRON) 27-0.8 MG tablet Take 1 tablet by mouth daily   9/15/2021 at Unknown time   .        Maternal Past Medical History:     Past Medical History:   Diagnosis Date     MTHFR gene mutation (H)      Past Surgical History:   Procedure Laterality Date     wisdom teeth                         Family History:     Family History   Problem Relation Age of Onset     Genetic Disorder Mother         MTHFR mutation     Pulmonary Embolism Mother         , no issues during pregnancy     Thyroid Cancer Father      Heart  Disease Maternal Grandmother      Diabetes Maternal Grandfather              Social History:     Social History     Tobacco Use     Smoking status: Never Smoker     Smokeless tobacco: Never Used   Substance Use Topics     Alcohol use: Not Currently     Comment: occ            Review of Systems:   The Review of Systems is negative other than noted in the HPI          Physical Exam:     Patient Vitals for the past 8 hrs:   BP Temp Temp src Pulse Resp SpO2   09/16/21 0807 -- -- -- -- -- 97 %   09/16/21 0804 92/53 -- -- -- -- --   09/16/21 0802 -- -- -- -- -- 97 %   09/16/21 0759 92/54 -- -- -- -- --   09/16/21 0757 -- -- -- -- -- 98 %   09/16/21 0755 99/52 -- -- -- -- --   09/16/21 0753 -- (!) 96.4  F (35.8  C) -- -- -- --   09/16/21 0752 -- -- -- -- -- 99 %   09/16/21 0749 (!) 89/53 -- -- -- -- --   09/16/21 0747 -- -- -- -- -- 98 %   09/16/21 0744 96/51 -- -- -- -- --   09/16/21 0742 -- -- -- -- -- 99 %   09/16/21 0739 96/48 -- -- -- -- --   09/16/21 0737 -- -- -- -- -- 98 %   09/16/21 0734 96/50 98.3  F (36.8  C) Temporal 71 16 98 %   09/16/21 0732 -- -- -- -- -- 98 %   09/16/21 0730 105/51 -- -- -- -- --   09/16/21 0727 -- -- -- -- -- 99 %   09/16/21 0726 97/52 -- -- -- -- --   09/16/21 0722 -- -- -- -- -- 98 %   09/16/21 0720 109/51 -- -- -- -- --   09/16/21 0717 -- -- -- -- -- 98 %   09/16/21 0714 (!) 88/52 -- -- -- -- --   09/16/21 0712 -- -- -- -- -- 97 %   09/16/21 0709 100/55 -- -- -- -- --   09/16/21 0707 -- -- -- -- -- 96 %   09/16/21 0705 110/52 -- -- -- -- --   09/16/21 0702 -- -- -- -- -- 96 %   09/16/21 0659 103/54 -- -- -- -- --   09/16/21 0657 -- -- -- -- -- 98 %   09/16/21 0655 105/54 -- -- -- -- --   09/16/21 0652 -- -- -- -- -- 98 %   09/16/21 0650 105/52 -- -- -- -- --   09/16/21 0647 -- -- -- -- -- 99 %   09/16/21 0644 109/59 -- -- -- -- --     Gen: resting in bed, NAD  CV: RRR  Resp: CTAB  Abd: gravid, soft, nontender  Ext: nontender    Cervix: 4/90/-2  Membranes: SROM, clear  EFW: 8.5  lbs  Presentation:Cephalic    Fetal Heart Rate Tracin, mod variability, + accels, suspected decel but discontinuous tracing, FSE placed  Tocometer: not tracing well, ?1-3 ctx in 10 min. IUPC placed        Assessment:   Clotilde Dorantes is a 21 year old  at 40w6d admitted with SROM.          Plan:   FWB: Cat II for suspected decel but moderate variability and reactive, overall reassuring, FSE placed for better monitoring. EFW 8.5 lbs  Labor: IUPC placed as difficult to assess contraction pattern.   Pain: epidural  GBS positive: on PCN  Rh positive, RI  Anticipate     Claudia Cardenas MD

## 2021-09-16 NOTE — PROGRESS NOTES
Intrapartum Progress Note    S: Patient is feeling back pain.     O: BP (!) 140/70   Pulse 71   Temp (!) 96.4  F (35.8  C)   Resp 16   LMP 2020   SpO2 97%    Gen: resting in bed, NAD  Cvx: 10/100/-1    FHT: 120, mod variability, + accels, early vs late decels.   [place]: 4 contractions in 10 min    Membranes: SROM, clear    A/P: 21 year old  at 40w6d by LMP c/w 8w0d US admitted for SROM  FWB: likely category I but some decels suspicious for late, maintains moderate variability, EFW 8.5 lbs  Labor: now complete and pushing, on pitocin per protocol  Pain: epidural  GBS: positive, PCN adequate  Rh: positive  Rubella: immune  Continue routine labor management.   Anticipate     Claudia Cardenas MD 4:57 PM

## 2021-09-16 NOTE — PROGRESS NOTES
cx 8/90/-1  Cephalic with moulding  FSE- shows reassuring tracing  IUPC shows q 2-4 min    Kenneth Wilder MD FACOG  11:59 AM 9/16/2021

## 2021-09-16 NOTE — PLAN OF CARE
Pt having decelerations, Dr. Anisha Cardenas in unit, visualizing strip. Ordered to stop Pitocin at 1100. Fluid bolus started, pt repositioned several times due to fetal heart rate and pt comfort. Pitocin restart at 2 miliunits at 1130.

## 2021-09-16 NOTE — ANESTHESIA PROCEDURE NOTES
Epidural catheter Procedure Note  Pre-Procedure   Staff -        CRNA: Jayme Oneill APRN CRNA       Performed By: CRNA       Location: OB       Procedure Start/Stop Times: 9/16/2021 4:36 AM and 9/16/2021 5:00 AM       Pre-Anesthestic Checklist: patient identified, IV checked, risks and benefits discussed, informed consent, monitors and equipment checked, pre-op evaluation, at physician/surgeon's request and post-op pain management  Timeout:       Correct Patient: Yes        Correct Procedure: Yes        Correct Site: Yes        Correct Position: Yes   Procedure Documentation  Procedure: epidural catheter       Diagnosis: Labor Pain       Patient Position: sitting       Patient Prep/Sterile Barriers: sterile gloves, mask, patient draped       Skin prep: Chloraprep       Local skin infiltrated with 5 mL of 1% lidocaine.        Insertion Site: L3-4. (midline approach).       Technique: LORT air        EFREM at 7 cm.       Needle Type: Touhy needle       Needle Gauge: 17.        Needle Length (Inches): 3.5        Catheter: 20 G.         Catheter threaded easily.         7 cm epidural space.         Threaded 14 cm at skin.         # of attempts: 1 and  # of redirects:  1    Assessment/Narrative         Paresthesias: No.       Test dose of 4 mL lidocaine 1.5% w/ 1:200,000 epinephrine at 04:48 CDT.         Test dose negative, 3 minutes after injection, for signs of intravascular, subdural, or intrathecal injection.       Insertion/Infusion Method: LORT air       Aspiration negative for Heme or CSF via Epidural Catheter.    Comments:  No Complications encountered.  Pt tolerated procedure well.

## 2021-09-16 NOTE — PROGRESS NOTES
Pt reporting urge to push and upper back pain. Cervix 9-10cm/100%/-1. Dr. Wilder notified. CRNA called to assess pt's epidural.

## 2021-09-16 NOTE — PROGRESS NOTES
Due to maternal activity before epidural, patient monitoring unsuccessful. Spot checks done as able. Patient now comfortable with epidural, EFM/EUM in place and tracing well.

## 2021-09-16 NOTE — ANESTHESIA PREPROCEDURE EVALUATION
Anesthesia Pre-Procedure Evaluation    Patient: Clotilde Dorantes   MRN: [id number] : 2000        Preoperative Diagnosis: * No pre-op diagnosis entered *   Procedure : * No procedures listed *     Past Medical History:   Diagnosis Date     MTHFR gene mutation (H)       Past Surgical History:   Procedure Laterality Date     wisdom teeth        Allergies   Allergen Reactions     Tramadol Other (See Comments)     Ankle swelling and pain      Social History     Tobacco Use     Smoking status: Never Smoker     Smokeless tobacco: Never Used   Substance Use Topics     Alcohol use: Not Currently     Comment: occ      Wt Readings from Last 1 Encounters:   21 107 kg (236 lb)        Anesthesia Evaluation   Pt has had prior anesthetic. Type: MAC.    No history of anesthetic complications       ROS/MED HX  ENT/Pulmonary:  - neg pulmonary ROS     Neurologic:  - neg neurologic ROS     Cardiovascular:  - neg cardiovascular ROS     METS/Exercise Tolerance:     Hematologic:  - neg hematologic  ROS     Musculoskeletal:       GI/Hepatic:     (+) GERD,     Renal/Genitourinary:       Endo:     (+) Obesity,     Psychiatric/Substance Use:  - neg psychiatric ROS     Infectious Disease:       Malignancy:       Other:            Physical Exam    Airway        Mallampati: II   TM distance: > 3 FB   Neck ROM: full   Mouth opening: > 3 cm    Respiratory Devices and Support         Dental  no notable dental history         Cardiovascular   cardiovascular exam normal          Pulmonary   pulmonary exam normal                OUTSIDE LABS:  CBC:   Lab Results   Component Value Date    WBC 13.1 (H) 2021    WBC 13.7 (H) 2021    HGB 10.6 (L) 2021    HGB 10.2 (L) 2021    HCT 32.8 (L) 2021    HCT 30.5 (L) 2021     2021     2021     BMP:   Lab Results   Component Value Date     2019     2019    POTASSIUM 4.0 2019    POTASSIUM 3.7 2019     CHLORIDE 105 08/23/2019    CHLORIDE 104 07/27/2019    CO2 25 08/23/2019    CO2 25 07/27/2019    BUN 16 08/23/2019    BUN 15 07/27/2019    CR 0.80 08/23/2019    CR 0.79 07/27/2019     (H) 08/23/2019     (H) 07/27/2019     COAGS: No results found for: PTT, INR, FIBR  POC:   Lab Results   Component Value Date    HCG Negative 08/23/2019     HEPATIC:   Lab Results   Component Value Date    ALBUMIN 4.5 08/23/2019    PROTTOTAL 7.0 08/23/2019    ALT 22 08/23/2019    AST 15 08/23/2019    ALKPHOS 43 08/23/2019    BILITOTAL 0.3 08/23/2019     OTHER:   Lab Results   Component Value Date    LACT 0.9 07/27/2019    CHAI 9.7 08/23/2019    LIPASE 13 08/23/2019    CRP 0.8 (H) 08/25/2019       Anesthesia Plan    ASA Status:  2      Anesthesia Type: Epidural.              Consents    Anesthesia Plan(s) and associated risks, benefits, and realistic alternatives discussed. Questions answered and patient/representative(s) expressed understanding.     - Discussed with:  Patient         Postoperative Care            Comments:           neg OB ROS.       KRYSTLE Carter CRNA

## 2021-09-16 NOTE — TELEPHONE ENCOUNTER
[phone]   Requesting a electric breast pump.   Thank you   Odalis Pugh on 9/16/2021 at 10:44 AM

## 2021-09-17 PROBLEM — D62 ANEMIA DUE TO BLOOD LOSS, ACUTE: Status: ACTIVE | Noted: 2021-09-17

## 2021-09-17 LAB — HGB BLD-MCNC: 8.1 G/DL (ref 11.7–15.7)

## 2021-09-17 PROCEDURE — 250N000013 HC RX MED GY IP 250 OP 250 PS 637: Performed by: OBSTETRICS & GYNECOLOGY

## 2021-09-17 PROCEDURE — 99207 PR NO CHARGE LOS: CPT | Performed by: OBSTETRICS & GYNECOLOGY

## 2021-09-17 PROCEDURE — 36415 COLL VENOUS BLD VENIPUNCTURE: CPT | Performed by: OBSTETRICS & GYNECOLOGY

## 2021-09-17 PROCEDURE — 85018 HEMOGLOBIN: CPT | Performed by: OBSTETRICS & GYNECOLOGY

## 2021-09-17 PROCEDURE — 120N000001 HC R&B MED SURG/OB

## 2021-09-17 RX ORDER — FERROUS SULFATE 325(65) MG
325 TABLET, DELAYED RELEASE (ENTERIC COATED) ORAL DAILY
Status: DISCONTINUED | OUTPATIENT
Start: 2021-09-17 | End: 2021-09-18 | Stop reason: HOSPADM

## 2021-09-17 RX ADMIN — IBUPROFEN 800 MG: 400 TABLET ORAL at 03:53

## 2021-09-17 RX ADMIN — DOCUSATE SODIUM 100 MG: 100 CAPSULE, LIQUID FILLED ORAL at 09:42

## 2021-09-17 RX ADMIN — ACETAMINOPHEN 650 MG: 325 TABLET, FILM COATED ORAL at 19:08

## 2021-09-17 RX ADMIN — FERROUS SULFATE TAB EC 325 MG (65 MG FE EQUIVALENT) 325 MG: 325 (65 FE) TABLET DELAYED RESPONSE at 09:42

## 2021-09-17 RX ADMIN — ACETAMINOPHEN 650 MG: 325 TABLET, FILM COATED ORAL at 12:39

## 2021-09-17 NOTE — ANESTHESIA POSTPROCEDURE EVALUATION
Patient: Clotilde Dorantes    * No procedures listed *    Diagnosis:* No pre-op diagnosis entered *  Diagnosis Additional Information: No value filed.    Anesthesia Type:  Epidural    Note:  Disposition: Inpatient   Postop Pain Control: Uneventful            Sign Out: Well controlled pain   PONV: No   Neuro/Psych: Uneventful            Sign Out: Acceptable/Baseline neuro status   Airway/Respiratory: Uneventful            Sign Out: Acceptable/Baseline resp. status   CV/Hemodynamics: Uneventful            Sign Out: Acceptable CV status; No obvious hypovolemia; No obvious fluid overload   Other NRE: NONE   DID A NON-ROUTINE EVENT OCCUR? No           Last vitals:  Vitals Value Taken Time   BP     Temp     Pulse     Resp     SpO2         Electronically Signed By: KRYSTLE ARROYO CRNA  September 17, 2021  9:32 AM

## 2021-09-17 NOTE — PLAN OF CARE
Patient is alert and oriented. VSS. Fundus is firm at 2 cm below the umbilicus. Flow is light. She had a few small clots earlier today but none since. C/o pain to perineum relieved with Tylenol. She is also using Tucs pads and ice pad. LS clear. She showered. Breastfeeding well. Will continue to monitor.

## 2021-09-17 NOTE — L&D DELIVERY NOTE
OB Vaginal Delivery Note    Clotilde Dorantes MRN# [id number]   Age: 21 year old YOB: 2000       GA: 40w6d  GP:   Labor Complications: Dysfunctional Labor   EBL:   mL  Delivery QBL:    Delivery Type: Vaginal, Vacuum (Extractor)   ROM to Delivery Time: rupture date or rupture time have not been documented   Weight: 3.547 kg (7 lb 13.1 oz)    1 Minute 5 Minute 10 Minute   Apgar Totals: 7   8        HAIDER MCKOY;FAITH VO;JOSE C MARROQUIN     Delivery Details:  Clotilde Dorantes, a 21 year old  who was admitted at 40w6d for SROM. She did not progress into spontaneous labor and was augmented with pitocin. She then progressed normally to complete dilation. She pushed ineffectively for an hour, then pushed somewhat effectively for another 1.5 hours to +2 station. She was in significant pain despite multiple epidural reboluses and requested vacuum-assistance for delivery.    After discussion with the patient regarding risks, benefits and alternatives of vacuum assisted vaginal delivery we elected to proceed. The patient was appropriately positioned in dorsal lithotomy.  The vacuum device was positioned appropriately at the flexion point. The fetal head delivered after 3 pulls over 1 contraction with expulsive efforts, and 0 pop-offs.  Maximum pressures during expulsive efforts as read on the vacuum device remained less than 600 mmHG delivering the fetal head.  The anterior shoulder was gently delivered with downward traction. The remainder of the baby was then delivered, the cord clamped and cut, and the baby placed on the mother's abdomen. The placenta delivered spontaneously, noted to be bilobed but appeared intact.  The perineum revealed no perineal lacerations but a right labial laceration that extended into the vagina was noted and repaired with 3-0 vicryl.  Apgars were 7/8. Weight 3547g.  Mother and baby are currently stable in the postpartumunit.  No complications  occurred.    Dr Campbell was requested to be present for delivery due to vacuum-assisted delivery and category II FHT    Claudia Cardenas MD  OB/GYN  2021 8:18 PM         Shannan Dorantes [[id number]]    Labor Event Times    Labor onset date: 21 Onset time:  5:00 AM   Dilation complete date: 21 Complete time:  4:18 PM   Start pushing date/time: 2021 1735      Labor Length    1st Stage (hrs): 11 (min): 18   2nd Stage (hrs): 3 (min): 6   3rd Stage (hrs): 0 (min): 3      Labor Events     labor?: No   steroids: None  Labor Type: Augmentation     Antibiotics received during labor?: Yes  Reason for Antibiotics: GBS  Antibiotics received for GBS: Penicillin  Antibiotics Given (GBS): Greater than 4 hours prior to delivery     Augmentation: Oxytocin  Indications for augmentation: Prolonged ROM     Delivery/Placenta Date and Time    Delivery Date: 21 Delivery Time:  7:24 PM   Placenta Date/Time: 2021  7:27 PM  Delivering clinician: Claudia Cardenas MD   Other personnel present at delivery:  Provider Role   Haider Nevarez, Oumou Carr, RN    Rekha Campbell, DO          Vaginal Counts     Initial count performed by 2 team members:  Two Team Members   MICHOACANO Duarte RN       Needles Suture Needles Sponges (RETIRED) Instruments   Initial counts 1  6    Added to count       Relief counts       Final counts 2  11          Placed during labor Accounted for at the end of labor   FSE Yes Yes   IUPC     Cervadil           Relief count performed by 2 team members:  Two Team Members   MICHOACANO Duarte MD           Final count correct?: Yes     Apgars    Living status: Living   1 Minute 5 Minute 10 Minute 15 Minute 20 Minute   Skin color: 0  1       Heart rate: 2  2       Reflex irritability: 2  2       Muscle tone: 2  2       Respiratory effort: 1  1       Total: 7  8       Apgars assigned by: HAIDER ULLOA     Cord    Vessels: 3 Vessels    Cord Complications:  "None               Cord Blood Disposition: Lab    Delayed cord clamping?: Yes    Cord Clamping Delay (seconds): 31-60 seconds    Stem cell collection?: No       [place] Resuscitation    Methods: None   Care at Delivery: Viable infant male born via  with vacuum assist. Warmed and stimulated, initial HR 150s.      [place] Measurements    Weight: 7 lb 13.1 oz Length: 1' 7\"   Head circumference: 33.7 cm       Skin to Skin and Feeding Plan    Skin to skin initiation date/time: 1841    Skin to skin with: Mother  Skin to skin end date/time:     Breastfeeding initiated date/time: 2021     Labor Events and Shoulder Dystocia    Fetal Tracing Prior to Delivery: Category 2  Shoulder dystocia present?: Neg     Delivery (Maternal) (Provider to Complete) ([id number])    Episiotomy: None  Perineal lacerations: None    Labial laceration: right Repaired?: Yes   Repair suture: 3-0 Vicryl  Genital tract inspection done: Pos     Blood Loss  Mother: Clotilde Dorantes #[id number]   Start of Mother's Information    Delivery Blood Loss  21 0500 - 21 2209    Delivery QBL (mL) Hospital Encounter 1096 mL    Total  1096 mL         End of Mother's Information  Mother: Clotilde Dorantes #[id number]          Delivery - Provider to Complete ([id number])    Delivering clinician: Claudia Cardenas MD  Delivery Type (Choose the 1 that will go to the Birth History): Vaginal, Vacuum (Extractor)    Verbal Informed Consent Obtained For Vacuum: Yes Alternate Labor Strategies Discussed (vacuum): Yes    Emergency Resources Available (vacuum): Resuscitation Team   Type (vacuum): kiwi Accrued Pulling Time (vacuum): 45 sec      # of Pop-Offs (vacuum): 0 # of Pulls/Contractions (vacuum): 1   Position (vacuum): OA Fetal Station (vacuum): +2      Indication for Operative Vaginal Delivery (vacuum): Maternal Exhaustion            Other personnel:  Provider Role   Henrietta Nevarez RN Sweet, Christy A., RN Soltis, Shelly M, DO     "             Placenta    Date/Time: 9/16/2021  7:27 PM  Removal: Spontaneous  Disposition: Hospital disposal           Anesthesia    Method: Epidural  Cervical dilation at placement: 0-3                Presentation and Position    Presentation: Vertex    Position: Left Occiput Anterior                 Claudia Cardenas MD

## 2021-09-17 NOTE — PROGRESS NOTES
Intrapartum Progress Note    S: Patient is still having a lot of back pain.     O: BP (!) 140/70   Pulse 71   Temp (!) 96.4  F (35.8  C)   Resp 16   LMP 2020   SpO2 97%    Gen: pushing  cvx 10/100/+1    FHT: 120, mod variability, variable and late decels with ctx  [place]: 3-4 contractions in 10 min    Membranes: SROM, clear    A/P: 21 year old  at 40w6d by LMP c/w 8w0d US admitted for SROM  FWB: category II, maintains moderate variability, EFW 8.5 lbs  Labor: complete and pushing, on pitocin per protocol. Making slow progress but effective pushing limited by pain. Discussed VAVD if she progresses to +2 station vs  section and she wants to keep pushing. OR crew called in on standby    Pain: epidural  GBS: positive, PCN adequate  Rh: positive  Rubella: immune    Claudia Cardenas MD 7:02 PM

## 2021-09-17 NOTE — PROGRESS NOTES
[facility]    Post-Partum Progress Note    Assessment & Plan   Assessment:  Post-partum day #1  Normal spontaneous vaginal delivery    No immediate surgical complications identified.  No excessive bleeding  Pain well-controlled.    Plan:  Anticipate discharge tomorrow  Start oral iron for acute blood loss anemia    Kenneth Wilder     Interval History   Doing well.  Pain is well-controlled.  No fevers.  No history of foul-smelling vaginal discharge.  Good appetite.  Denies chest pain, shortness of breath, nausea or vomiting.  Vaginal bleeding is similar to a heavy menstrual flow.  Ambulatory.  Breastfeeding well.    Medications     - MEDICATION INSTRUCTIONS -       - MEDICATION INSTRUCTIONS -       oxytocin in 0.9% NaCl         docusate sodium  100 mg Oral Daily       Physical Exam   Temp: 97.2  F (36.2  C) Temp src: Temporal BP: 107/60 Pulse: 93   Resp: 16 SpO2: 97 % O2 Device: None (Room air)    There were no vitals filed for this visit.  Vital Signs with Ranges  Temp:  [96.4  F (35.8  C)-98.6  F (37  C)] 97.2  F (36.2  C)  Pulse:  [] 93  Resp:  [16] 16  BP: ()/(46-79) 107/60  SpO2:  [85 %-100 %] 97 %  I/O last 3 completed shifts:  In: 240 [P.O.:240]  Out: 1213 [Blood:1213]    Uterine fundus is firm, non-tender and at the level of the umbilicus  Extremities Non-tender    Data   Recent Labs   Lab Test 09/16/21  0241 01/29/21  1204   ABO  --  A   RH  --  Pos   AS Negative Neg     Recent Labs   Lab Test 09/17/21  0635 09/16/21  0241   HGB 8.1* 10.6*     Recent Labs   Lab Test 01/29/21  1205   RUQIGG 74

## 2021-09-17 NOTE — PROGRESS NOTES
Pt complete at 1618. Ineffective pushing from 1630 to 1735. Pt started effectively pushing at 1735. Dr. Cardenas in unit and at bedside watching EFM/EUM monitor throughout ineffective and effective pushing.

## 2021-09-18 VITALS
TEMPERATURE: 97.5 F | HEART RATE: 90 BPM | OXYGEN SATURATION: 97 % | SYSTOLIC BLOOD PRESSURE: 128 MMHG | RESPIRATION RATE: 18 BRPM | DIASTOLIC BLOOD PRESSURE: 67 MMHG

## 2021-09-18 PROCEDURE — 250N000013 HC RX MED GY IP 250 OP 250 PS 637: Performed by: OBSTETRICS & GYNECOLOGY

## 2021-09-18 PROCEDURE — 99207 PR NO CHARGE LOS: CPT | Performed by: OBSTETRICS & GYNECOLOGY

## 2021-09-18 RX ORDER — DOCUSATE SODIUM 100 MG/1
100 CAPSULE, LIQUID FILLED ORAL 2 TIMES DAILY PRN
Qty: 30 CAPSULE | Refills: 0 | Status: SHIPPED | OUTPATIENT
Start: 2021-09-18 | End: 2021-10-27

## 2021-09-18 RX ADMIN — DOCUSATE SODIUM 100 MG: 100 CAPSULE, LIQUID FILLED ORAL at 09:36

## 2021-09-18 RX ADMIN — ACETAMINOPHEN 650 MG: 325 TABLET, FILM COATED ORAL at 03:29

## 2021-09-18 RX ADMIN — IBUPROFEN 800 MG: 400 TABLET ORAL at 14:01

## 2021-09-18 RX ADMIN — ACETAMINOPHEN 650 MG: 325 TABLET, FILM COATED ORAL at 09:36

## 2021-09-18 RX ADMIN — FERROUS SULFATE TAB EC 325 MG (65 MG FE EQUIVALENT) 325 MG: 325 (65 FE) TABLET DELAYED RESPONSE at 09:36

## 2021-09-18 NOTE — DISCHARGE SUMMARY
[facility]    Discharge Summary  Obstetrics    Date of Admission:  2021  Date of Discharge:  2021  Discharging Provider: Kenneth Wilder    Discharge Diagnoses   Vacuum assisted vaginal delivery.  Anemia- acute blood loss    History of Present Illness   Clotilde Dorantes is a 21 year old female who presented with PROM with augmentation, and had a vacuum assisted vaginal delivery which was uncomplicated.    Hospital Course   The patient's hospital course was unremarkable.  She recovered as anticipated and experienced no post-delivery complications.  On discharge, her pain was well controlled. Vaginal bleeding is similar to peak menstrual flow.  Voiding without difficulty.  Ambulating well and tolerating a normal diet.  No fevers.  Breastfeeding well.  Infant is stable.  She was discharged on post-partum day #2.    Post-partum hemoglobin:   Hemoglobin   Date Value Ref Range Status   2021 8.1 (L) 11.7 - 15.7 g/dL Final   2021 10.2 (L) 11.7 - 15.7 g/dL Final       Kenneth Wilder MD    Discharge Disposition   Discharged to home   Condition at discharge: Stable    Primary Care Physician   Wanda Xie    Consultations This Hospital Stay   HOME CARE POST PARTUM/ IP CONSULT  LACTATION IP CONSULT    Discharge Orders      Activity    Activity as tolerated     Reason for your hospital stay    Maternity care     Follow Up    Follow up with provider in 2 weeks and 6 weeks for post-delivery checks     Breast pump - Manual/Electric    Breast Pump Documentation:  Manual/Electric Pump: To support adequate breast milk production and nutrition for infant.     I, the undersigned, certify that the above prescribed supplies are medically necessary for this patient and is both reasonable and necessary in reference to accepted standards of medical and necessary in reference to accepted standards of medical practice in the treatment of this patient's condition and is not prescribed  as a convenience.     Diet    Resume previous diet     Discharge Medications   Current Discharge Medication List      START taking these medications    Details   docusate sodium (COLACE) 100 MG capsule Take 1 capsule (100 mg) by mouth 2 times daily as needed for constipation  Qty: 30 capsule, Refills: 0    Associated Diagnoses:  (spontaneous vaginal delivery)         CONTINUE these medications which have NOT CHANGED    Details   ferrous sulfate (FEROSUL) 325 (65 Fe) MG tablet Take 1 tablet (325 mg) by mouth daily (with breakfast)  Qty: 60 tablet, Refills: 3    Associated Diagnoses: Anemia during pregnancy in second trimester      Prenatal Vit-Fe Fumarate-FA (PRENATAL MULTIVITAMIN W/IRON) 27-0.8 MG tablet Take 1 tablet by mouth daily      Misc. Devices (BREAST PUMP) MISC Breast Pump for home use.  Reason:  Separation from baby due to work/school.  CYNDEE: 99.  Dx:  Z39.10.  Qty: 1 each, Refills: 0    Associated Diagnoses: Lactating mother           Allergies   Allergies   Allergen Reactions     Tramadol Other (See Comments)     Ankle swelling and pain

## 2021-09-18 NOTE — PROGRESS NOTES
Educated on breast feeding and assisted with better positioning. Good latch. Warm moist packs to sore nipples after feed and educated on breast shell.

## 2021-09-18 NOTE — PROGRESS NOTES
[facility]    Post-Partum Progress Note    Assessment & Plan   Assessment:  Post-partum day #2  Normal spontaneous vaginal delivery  Acute blood loss anemia    Doing well.    Plan:  Discharge later today  Oral iron replacement    Kenneth Wilder     Interval History   Doing well.  Pain is well-controlled.  No fevers.  No history of foul-smelling vaginal discharge.  Good appetite.  Denies chest pain, shortness of breath, nausea or vomiting.  Vaginal bleeding is similar to a heavy menstrual flow.  Ambulatory.  Breastfeeding well.    Medications     - MEDICATION INSTRUCTIONS -       - MEDICATION INSTRUCTIONS -       oxytocin in 0.9% NaCl         docusate sodium  100 mg Oral Daily     ferrous sulfate  325 mg Oral Daily       Physical Exam   Temp: 97.5  F (36.4  C) Temp src: Temporal BP: 128/67 Pulse: 90   Resp: 18 SpO2: 97 % O2 Device: None (Room air)    There were no vitals filed for this visit.  Vital Signs with Ranges  Temp:  [97.5  F (36.4  C)-98.2  F (36.8  C)] 97.5  F (36.4  C)  Pulse:  [90-97] 90  Resp:  [16-18] 18  BP: (118-128)/(55-71) 128/67  SpO2:  [97 %-99 %] 97 %  No intake/output data recorded.    Uterine fundus is firm, non-tender and at the level of the umbilicus  Extremities Non-tender    Data   Recent Labs   Lab Test 09/16/21  0241 01/29/21  1204   ABO  --  A   RH  --  Pos   AS Negative Neg     Recent Labs   Lab Test 09/17/21  0635 09/16/21  0241   HGB 8.1* 10.6*     Recent Labs   Lab Test 01/29/21  1205   RUQIGG 74

## 2021-09-18 NOTE — PROGRESS NOTES
[facility] DISCHARGE NOTE    Patient discharged to home at 4:10 PM via ambulation. Accompanied by spouse and staff. Discharge instructions reviewed with patient and spouse, opportunity offered to ask questions. Prescriptions sent to patients preferred pharmacy. All belongings sent with patient.    Carolee Murillo RN

## 2021-09-18 NOTE — PROGRESS NOTES
Pt nursing independently. Up in room. Medicated for perineum and and lower back pain.Warm packs to lower back.

## 2021-09-20 ENCOUNTER — LACTATION ENCOUNTER (OUTPATIENT)
Age: 21
End: 2021-09-20

## 2021-09-20 ENCOUNTER — HOSPITAL ENCOUNTER (OUTPATIENT)
Dept: OBGYN | Facility: OTHER | Age: 21
End: 2021-09-20
Attending: STUDENT IN AN ORGANIZED HEALTH CARE EDUCATION/TRAINING PROGRAM
Payer: COMMERCIAL

## 2021-09-20 PROCEDURE — S9443 LACTATION CLASS: HCPCS | Performed by: REGISTERED NURSE

## 2021-09-20 NOTE — LACTATION NOTE
This note was copied from a baby's chart.  Outpatient Lactation Visit    Jose Bonilla  [id number]    Consultation Date: 2021     Reason for Lactation Referral: Initial Lactation Consult    Baby's : 2021    Baby's Current Age: 4 day old  Baby's Gestational Age: Gestational Age: 40w6d    Primary Care Provider: Silvia Lopez    Presenting Problem (concerns as stated by parent): no concerns    MATERNAL HISTORY   History of Breast Surgery: no  Breast Changes During Pregnancy: no  Breast Feeding History: primigravida  Maternal Meds: daily prenatal vitamin  Pregnancy Complications: none  Anesthesia during labor: epidural    MATERNAL ASSESSMENT    Breast Size: average, symmetrical, soft after feeding and filling prior to feeding  Nipple Appearance - Left: slightly cracked, with signs of healing, education on further healing techniques provided  Nipple Appearance - Right: slightly cracked, with signs of healing, education on further healing techniques provided  Nipple Erectility - Left: erect with stimulation  Nipple Erectility - Right: erect with stimulation  Areolas Compressibility: soft  Nipple Size: average  Special Equipment Used: 24 mm nipple shield (given today)  Day mother reports milk came in:  Late last night    INFANT ASSESSMENT    Oral Anatomy  Mouth: normal  Palate: normal  Jaw: normal  Tongue: normal  Frenulum: normal   Digital Suck Exam: root    FEEDING   Feeding Time: aggressively for 20 minutes  Position:  cradle  Effort to Latch: awake and alert, latched easily  Duration of Breast Feeding: Right Breast: 20; Left Breast: 0  Results: excellent breast feed    Volume of Intake:    Birth Weight: 7 lb 13.1 oz    Hospital discharge weight: 7 lb 6.2 oz (discharged 24 at 24 hours)    Today's Weight 6 lb 15.5 oz    Total Intake: 1.2 oz  Output: 3-4 soil diapers in last 24 hours, 2-3 wet diapers in last 24 hours    LATCH Score:   Latch: 2 - Good Latch  Audible Swallowin -  Spontaneous & frequent  Type of Nipple: (Breast/Nipple) 1 - Flat  Comfort: 2 - Soft, Nontender  Hold: 2 - No Assist   Total LATCH Score:  9    FEEDING PLAN    Home Feeding Plan: Continue to feed on demand when  elicits feeding cues with deep latch.  Babe should be eating 8-12 times in a 24 hour period.  Exclusivity explained and encouraged in the early weeks to establish breastfeeding and order in milk supply.  Rooming-in encouraged with explanation of the benefits.  Continue to apply expressed breast milk and Lanolin cream to nipples after feedings for healing and comfort.  Postpartum breastfeeding assessment completed and education provided.  Items included in the education are:     proper positioning and latch    effectiveness of feeding    manual expression    handling and storing breastmilk    maintenance of breastfeeding for the first 6 months    sign/symptoms of infant feeding issues requiring referral to qualified health care provider    LACTATION COMMENTS   Deep latch explained for proper positioning of breast in infant's mouth, maximizing milk transfer and comfort.  Reassurance and encouragement provided in regard to mom's concerns about milk supply.  Follow-up support information provided.  Parents plan to keep  Well-Child Check with Dr. Padilla as scheduled for 2 week well child check.      Face-to-face Time: 60 minutes with assessment and education.    Leia Bartlett RN  2021  8:28 AM

## 2021-10-08 ENCOUNTER — LACTATION ENCOUNTER (OUTPATIENT)
Age: 21
End: 2021-10-08

## 2021-10-08 NOTE — LACTATION NOTE
This note was copied from a baby's chart.  Jose is here today with mom Clotilde for a weight check. Jose was last seen in the clinic on 10/1/2021 and was not showing an adequate weight gain. Jose has been strictly breastfeeding with an occasional supplementation of formula. Clotilde states Jose is having 4-5 yellow, seedy stools per day and 4-5 wet diapers per day.    Jose's weight today is 7 lb 13 oz showing an adequate weight gain of 7 oz since his last clinic visit. Encouraged Clotilde to continue with the current feeding regime and to give formula if Jose does not seem satisfied after breastfeeding. Clotilde should also begin pumping to try to increase her milk supply. Formula sent home with patient. Jose will follow-up as scheduled for his next clinic visit with Dr. Padilla.

## 2021-10-10 ENCOUNTER — HEALTH MAINTENANCE LETTER (OUTPATIENT)
Age: 21
End: 2021-10-10

## 2021-10-27 ENCOUNTER — PRENATAL OFFICE VISIT (OUTPATIENT)
Dept: OBGYN | Facility: OTHER | Age: 21
End: 2021-10-27
Attending: STUDENT IN AN ORGANIZED HEALTH CARE EDUCATION/TRAINING PROGRAM
Payer: COMMERCIAL

## 2021-10-27 VITALS
SYSTOLIC BLOOD PRESSURE: 122 MMHG | HEART RATE: 100 BPM | BODY MASS INDEX: 41.28 KG/M2 | WEIGHT: 225.7 LBS | DIASTOLIC BLOOD PRESSURE: 80 MMHG

## 2021-10-27 DIAGNOSIS — Z37.9 VACUUM-ASSISTED VAGINAL DELIVERY: Primary | ICD-10-CM

## 2021-10-27 PROCEDURE — 99207 PR POST-PARTUM 6 WK VISIT - GICH ONLY: CPT | Performed by: STUDENT IN AN ORGANIZED HEALTH CARE EDUCATION/TRAINING PROGRAM

## 2021-10-27 ASSESSMENT — EDINBURGH POSTNATAL DEPRESSION SCALE (EPDS)
I HAVE LOOKED FORWARD WITH ENJOYMENT TO THINGS: AS MUCH AS I EVER DID
THINGS HAVE BEEN GETTING ON TOP OF ME: NO, MOST OF THE TIME I HAVE COPED QUITE WELL
I HAVE BEEN ABLE TO LAUGH AND SEE THE FUNNY SIDE OF THINGS: AS MUCH AS I ALWAYS COULD
THE THOUGHT OF HARMING MYSELF HAS OCCURRED TO ME: NEVER
I HAVE FELT SAD OR MISERABLE: NOT VERY OFTEN
I HAVE BEEN ANXIOUS OR WORRIED FOR NO GOOD REASON: HARDLY EVER
I HAVE BEEN SO UNHAPPY THAT I HAVE BEEN CRYING: NO, NEVER
I HAVE BLAMED MYSELF UNNECESSARILY WHEN THINGS WENT WRONG: NOT VERY OFTEN
TOTAL SCORE: 5
I HAVE FELT SCARED OR PANICKY FOR NO GOOD REASON: NO, NOT MUCH
I HAVE BEEN SO UNHAPPY THAT I HAVE HAD DIFFICULTY SLEEPING: NOT AT ALL

## 2021-10-27 NOTE — NURSING NOTE
Pt presents to clinic today for 6 week post partum care.      Medication Reconciliation: complete  Alayna Crump LPN

## 2021-10-27 NOTE — PROGRESS NOTES
Postpartum Office Visit    S: Ms. Dorantes is a  who is s/p an VAVD on 2021. Her delivery was uncomplicated. Her postpartum course in the hospital was also uncomplicated. She is feeling well today. She has no acute concerns. She notes her mood has been good, denies depression or any concern for harm to herself or others. She has been breast feeding the baby. She has not had return of menses. She has been sexually active a few times since delivery and has been using condoms for contraception. Declines contraception.    O: /80   Pulse 100   Wt 102.4 kg (225 lb 11.2 oz)   LMP 2020   BMI 41.28 kg/m    Gen: Well-appearing, NAD  Pelvic: Normal appearing external genitalia, normal hair distribution. Introitus intact and well-healed. Vagina is moist and pink. There is no vaginal discharge. The cervix is closed and without lesions. Bimanual exam reveals a uterus that has returned to 8 week size, mobile, midline, anteverted and no CMT or fundal tenderness.    Assessment:  Ms. Clotilde Dorantes is a 21 year old yo now  who is s/p VAVD on 2021. She is doing well in the postpartum period.    Plan:  # Routine postpartum care  -- Feeling well, no concerns  -- breast feeding  -- Contraception goals: declines, using condoms at this time  -- Mood stable and doing well    Return to clinic with any questions or concerns    Leslie Desai MD  OB/GYN  10/27/2021 4:58 PM

## 2021-11-18 ENCOUNTER — OFFICE VISIT (OUTPATIENT)
Dept: FAMILY MEDICINE | Facility: OTHER | Age: 21
End: 2021-11-18
Attending: NURSE PRACTITIONER
Payer: COMMERCIAL

## 2021-11-18 VITALS
OXYGEN SATURATION: 98 % | SYSTOLIC BLOOD PRESSURE: 118 MMHG | BODY MASS INDEX: 40.62 KG/M2 | WEIGHT: 222.1 LBS | DIASTOLIC BLOOD PRESSURE: 82 MMHG | TEMPERATURE: 98.9 F | RESPIRATION RATE: 16 BRPM | HEART RATE: 80 BPM

## 2021-11-18 DIAGNOSIS — R05.9 COUGH: ICD-10-CM

## 2021-11-18 DIAGNOSIS — H65.191 OTHER NON-RECURRENT ACUTE NONSUPPURATIVE OTITIS MEDIA OF RIGHT EAR: Primary | ICD-10-CM

## 2021-11-18 DIAGNOSIS — B30.9 VIRAL CONJUNCTIVITIS OF RIGHT EYE: ICD-10-CM

## 2021-11-18 DIAGNOSIS — R09.81 NASAL CONGESTION: ICD-10-CM

## 2021-11-18 PROCEDURE — G0463 HOSPITAL OUTPT CLINIC VISIT: HCPCS

## 2021-11-18 PROCEDURE — 99213 OFFICE O/P EST LOW 20 MIN: CPT | Performed by: NURSE PRACTITIONER

## 2021-11-18 PROCEDURE — U0003 INFECTIOUS AGENT DETECTION BY NUCLEIC ACID (DNA OR RNA); SEVERE ACUTE RESPIRATORY SYNDROME CORONAVIRUS 2 (SARS-COV-2) (CORONAVIRUS DISEASE [COVID-19]), AMPLIFIED PROBE TECHNIQUE, MAKING USE OF HIGH THROUGHPUT TECHNOLOGIES AS DESCRIBED BY CMS-2020-01-R: HCPCS | Mod: ZL | Performed by: NURSE PRACTITIONER

## 2021-11-18 PROCEDURE — C9803 HOPD COVID-19 SPEC COLLECT: HCPCS | Performed by: NURSE PRACTITIONER

## 2021-11-18 RX ORDER — AMOXICILLIN 500 MG/1
500 CAPSULE ORAL 2 TIMES DAILY
Qty: 14 CAPSULE | Refills: 0 | Status: SHIPPED | OUTPATIENT
Start: 2021-11-18 | End: 2021-11-25

## 2021-11-18 ASSESSMENT — PAIN SCALES - GENERAL: PAINLEVEL: SEVERE PAIN (7)

## 2021-11-18 NOTE — NURSING NOTE
"Chief Complaint   Patient presents with     Ear Problem     Sinus Problem     Patient presents to the clinic today for ear pain and sinus issues that started 4 days ago.     FOOD SECURITY SCREENING QUESTIONS  Hunger Vital Signs:  Within the past 12 months we worried whether our food would run out before we got money to buy more. Never  Within the past 12 months the food we bought just didn't last and we didn't have money to get more. Never  Deb Fontenot LPN 11/18/2021 11:29 AM      Initial /82 (BP Location: Right arm, Patient Position: Sitting, Cuff Size: Adult Regular)   Pulse 80   Temp 98.9  F (37.2  C) (Tympanic)   Resp 16   Wt 100.7 kg (222 lb 1.6 oz)   SpO2 98%   BMI 40.62 kg/m   Estimated body mass index is 40.62 kg/m  as calculated from the following:    Height as of 4/15/21: 1.575 m (5' 2\").    Weight as of this encounter: 100.7 kg (222 lb 1.6 oz).  Medication Reconciliation: complete    Deb Fontenot LPN  "

## 2021-11-18 NOTE — PATIENT INSTRUCTIONS
netti pot/saline rinse     Treating ear infection with amoxicillin   Follow up if symptoms persist.     covid test pending. Quarantine until results return.

## 2021-11-18 NOTE — PROGRESS NOTES
ASSESSMENT/PLAN:    I have reviewed the nursing notes.  I have reviewed the findings, diagnosis, plan and need for follow up with the patient.    1. Other non-recurrent acute nonsuppurative otitis media of right ear  - amoxicillin (AMOXIL) 500 MG capsule; Take 1 capsule (500 mg) by mouth 2 times daily for 7 days  Dispense: 14 capsule; Refill: 0  Opted to treat with amoxicillin rather than augmentin as patient is nursing. Discussed with patient both would be safe but it amoxicillin is considered lower risk per Up To Date guidilnes     2. Cough  3. Nasal congestion  4. Viral conjunctivitis  Viral symptoms are present, discussed amoxicillin will not treat this but treating for signs of ear infection of right ear. Right eye conjunctivitis is most likely viral. May treat with OTC eye drops for comfort.   - Symptomatic COVID-19 Virus (Coronavirus) by PCR Nose  Negative covid   - May use over-the-counter Tylenol or ibuprofen PRN    Discussed warning signs/symptoms indicative of need to f/u    Follow up if symptoms persist or worsen or concerns    I explained my diagnostic considerations and recommendations to the patient, who voiced understanding and agreement with the treatment plan. All questions were answered. We discussed potential side effects of any prescribed or recommended therapies, as well as expectations for response to treatments.    Park Bee NP  11/18/2021  11:32 AM    HPI:  Clotilde Dorantes is a 21 year old female who presents to [facility] today for concerns of ear pain and sinus issues that started 4 days ago. First congestion and runny noses started, along with a productive cough. Ear pain started just this morning that is moderate to severe in intensity. History of ear infections as a child, not as adult. Also noticed red itchy eye with some drainage starting this morning. No fevers or chills. Slight chest discomfort and cough worse with lying down and at night. No one else at home is sick.        Past Medical History:   Diagnosis Date     MTHFR gene mutation      Past Surgical History:   Procedure Laterality Date     wisdom teeth       Social History     Tobacco Use     Smoking status: Never Smoker     Smokeless tobacco: Never Used   Substance Use Topics     Alcohol use: Not Currently     Comment: occ     Current Outpatient Medications   Medication Sig Dispense Refill     amoxicillin (AMOXIL) 500 MG capsule Take 1 capsule (500 mg) by mouth 2 times daily for 7 days 14 capsule 0     ferrous sulfate (FEROSUL) 325 (65 Fe) MG tablet Take 1 tablet (325 mg) by mouth daily (with breakfast) 60 tablet 3     Misc. Devices (BREAST PUMP) MISC Breast Pump for home use.  Reason:  Separation from baby due to work/school.  YCNDEE: 99.  Dx:  Z39.10. 1 each 0     Prenatal Vit-Fe Fumarate-FA (PRENATAL MULTIVITAMIN W/IRON) 27-0.8 MG tablet Take 1 tablet by mouth daily       Allergies   Allergen Reactions     Tramadol Other (See Comments)     Ankle swelling and pain     Past medical history, past surgical history, current medications and allergies reviewed and accurate to the best of my knowledge.      ROS:  Refer to HPI    /82 (BP Location: Right arm, Patient Position: Sitting, Cuff Size: Adult Regular)   Pulse 80   Temp 98.9  F (37.2  C) (Tympanic)   Resp 16   Wt 100.7 kg (222 lb 1.6 oz)   SpO2 98%   BMI 40.62 kg/m      EXAM:  General Appearance: Well appearing 21 year old female, appropriate appearance for age. No acute distress   Ears: Left TM intact, translucent with bony landmarks appreciated, no erythema, no effusion, no bulging, no purulence.  Right TM intact, + with erythema, slightly bulging, and decreased visualization of bony landmarks, no effusion, no bulging, no purulence.  Left auditory canal clear.  Right auditory canal clear.  Normal external ears, non tender.  Eyes: + conjunctivae of right eye is irritated and mildly erythematous, corneas clear, no drainage or crusting, no eyelid swelling, pupils  equal   Orophayrnx: moist mucous membranes, posterior pharynx without erythema, tonsils symmetric, no erythema, no exudates or petechiae, no post nasal drip seen, no trismus, voice clear.    Sinuses:  + sinus tenderness upon palpation of the maxillary sinuses bilaterally   Nose:  Bilateral nares: no erythema, no edema, no drainage or congestion   Neck: supple without adenopathy  Respiratory: normal chest wall and respirations.  Normal effort.  Clear to auscultation bilaterally, no wheezing, crackles or rhonchi.  No increased work of breathing.  No cough appreciated.  Cardiac: RRR with no murmurs  Psychological: normal affect, alert, oriented, and pleasant.     Results for orders placed or performed in visit on 11/18/21   Symptomatic COVID-19 Virus (Coronavirus) by PCR Nose     Status: Normal    Specimen: Nose; Swab   Result Value Ref Range    SARS CoV2 PCR Negative Negative, Testing sent to reference lab. Results will be returned via unsolicited result    Narrative    Testing was performed using the Aptima SARS-CoV-2 Assay on the  [employer] Instrument System. Additional information about this  Emergency Use Authorization (EUA) assay can be found via the Lab  Guide. This test should be ordered for the detection of SARS-CoV-2 in  individuals who meet SARS-CoV-2 clinical and/or epidemiological  criteria. Test performance is unknown in asymptomatic patients. This  test is for in vitro diagnostic use under the FDA EUA for  laboratories certified under CLIA to perform high complexity testing.  This test has not been FDA cleared or approved. A negative result  does not rule out the presence of PCR inhibitors in the specimen or  target RNA in concentration below the limit of detection for the  assay. The possibility of a false negative should be considered if  the patient's recent exposure or clinical presentation suggests  COVID-19. This test was validated by the [facility].  This laboratory is certified under  the Clinical Laboratory Improvement Amendments of 1988 (CLIA-88) as  qualified to perform high complexity laboratory testing.

## 2021-11-19 LAB — SARS-COV-2 RNA RESP QL NAA+PROBE: NEGATIVE

## 2021-12-10 ENCOUNTER — MEDICAL CORRESPONDENCE (OUTPATIENT)
Dept: HEALTH INFORMATION MANAGEMENT | Facility: OTHER | Age: 21
End: 2021-12-10
Payer: COMMERCIAL

## 2022-01-21 ENCOUNTER — HOSPITAL ENCOUNTER (OUTPATIENT)
Dept: OBGYN | Facility: OTHER | Age: 22
End: 2022-01-21
Attending: STUDENT IN AN ORGANIZED HEALTH CARE EDUCATION/TRAINING PROGRAM
Payer: COMMERCIAL

## 2022-01-21 ENCOUNTER — LACTATION ENCOUNTER (OUTPATIENT)
Age: 22
End: 2022-01-21
Payer: COMMERCIAL

## 2022-01-21 PROCEDURE — S9443 LACTATION CLASS: HCPCS | Performed by: REGISTERED NURSE

## 2022-01-21 NOTE — LACTATION NOTE
This note was copied from a baby's chart.  Jose is a 4 month old infant who is in today to check latch and weight. Mom (Clotilde) called on Wednesday wondering how she can increase her milk supply and to check his latch. Instructed Clotilde over the phone to start pumping to try and increase her supply and to follow-up with me today. Jose is strictly breastfeeding and was last seen in the clinic on December 10 by Dr. Padilla for his routine 2 month well child check. The weight at that appointment showed an insufficient amount of weight gain. Jose was at the 0.2 percentile for weight. Dr. Padilla encouraged supplementing with formula and to follow-up in 1-2 weeks for a repeat weight check. This is Jose's first weight check since the December 10 appointment. Formula was sent home at that appointment. Clotilde prefers to strictly breastfeed and states she gave the formula for a short time but then discontinued.     Jose's weight today is 10 lb 3.2 oz showing in insufficient weight gain from the December 10 appointment. Jose is now below <0.1 percentile for weight. Observed Jose at the breast today. Jose will latch on with no problems, but does not show an adequate suck to swallow ratio. Jose pulls off frequently, and Clotilde states this is a typical breastfeeding session. Jose only gained 0.8 oz after nursing on both sides. Education provided to Clotilde regarding the need for supplementation of formula at this point. Clotilde does not want to supplement with formula, but states that she will. Instructed Clotilde to feed Jose at the breast as usual and then supplement after each feeding session with formula. Offered to send formula home with Clotilde, but she prefers to use what she has ([employer] formula). Also instructed Clotilde to pump more frequently to try and increase her supply if she wants to give Jose more breast milk. Clotilde states she only pumped 2 times with a hand  pump (the electric pump is too painful per Clotilde) since I talked to her on Wednesday.     Jose should follow-up with me on Wednesday for another weight check. Appointment scheduled on Wednesday, January 26 at 1:00 with myself for patient. Jose's 4 month well child check is rescheduled with Dr. Campbell (per Clotilde's request) on Thursday, January 27 at 1240.

## 2022-01-26 ENCOUNTER — LACTATION ENCOUNTER (OUTPATIENT)
Age: 22
End: 2022-01-26
Payer: COMMERCIAL

## 2022-01-26 NOTE — LACTATION NOTE
This note was copied from a baby's chart.  Jose is here with mom Clotilde for a weight check. Was seen by myself last Friday, January 24 and was not gaining weight at an adequate rate. Weight last Friday was 10 lb 3.2 oz putting chance at the <0.01 percentile on the growth chart. Clotilde was very reluctant to give formula supplementation. Instructed Clotilde on the importance of giving Jose more calories and again instructed Clotilde on Friday to begin supplementing with formula after every breastfeeding session.    Jose's weight today is 11 lb 1 oz showing an above average weight gain in 5 days. Clotilde states she has been following my recommendations of supplementing with formula after each breastfeeding session. Clotilde is giving 2 oz of [employer] formula after each feeding session. Clotilde states she ran out of formula today, and does not have enough money to buy another can right now. Clotilde was given a can of Similac Sensitive from a friend that she will start giving. Clotilde states she does get WIC, and will contact [employer] to get vouchers for formula. Similac Sensitive formula sent home with Clotilde today.     Jose has a follow-up appointment to see Dr. Campbell tomorrow at 1240 for his 4 month well child check.

## 2022-03-29 ENCOUNTER — OFFICE VISIT (OUTPATIENT)
Dept: OBGYN | Facility: OTHER | Age: 22
End: 2022-03-29
Attending: STUDENT IN AN ORGANIZED HEALTH CARE EDUCATION/TRAINING PROGRAM
Payer: COMMERCIAL

## 2022-03-29 VITALS
HEART RATE: 86 BPM | OXYGEN SATURATION: 99 % | DIASTOLIC BLOOD PRESSURE: 80 MMHG | SYSTOLIC BLOOD PRESSURE: 120 MMHG | RESPIRATION RATE: 16 BRPM

## 2022-03-29 DIAGNOSIS — Z11.3 SCREENING EXAMINATION FOR SEXUALLY TRANSMITTED DISEASE: Primary | ICD-10-CM

## 2022-03-29 LAB
C TRACH DNA SPEC QL PROBE+SIG AMP: NEGATIVE
CLUE CELLS: NORMAL
N GONORRHOEA DNA SPEC QL NAA+PROBE: NEGATIVE
TRICHOMONAS, WET PREP: NORMAL
WBC'S/HIGH POWER FIELD, WET PREP: NORMAL
YEAST, WET PREP: NORMAL

## 2022-03-29 PROCEDURE — 86780 TREPONEMA PALLIDUM: CPT | Mod: ZL | Performed by: STUDENT IN AN ORGANIZED HEALTH CARE EDUCATION/TRAINING PROGRAM

## 2022-03-29 PROCEDURE — 87491 CHLMYD TRACH DNA AMP PROBE: CPT | Mod: ZL | Performed by: STUDENT IN AN ORGANIZED HEALTH CARE EDUCATION/TRAINING PROGRAM

## 2022-03-29 PROCEDURE — 36415 COLL VENOUS BLD VENIPUNCTURE: CPT | Mod: ZL | Performed by: STUDENT IN AN ORGANIZED HEALTH CARE EDUCATION/TRAINING PROGRAM

## 2022-03-29 PROCEDURE — 86803 HEPATITIS C AB TEST: CPT | Mod: ZL | Performed by: STUDENT IN AN ORGANIZED HEALTH CARE EDUCATION/TRAINING PROGRAM

## 2022-03-29 PROCEDURE — G0463 HOSPITAL OUTPT CLINIC VISIT: HCPCS | Performed by: STUDENT IN AN ORGANIZED HEALTH CARE EDUCATION/TRAINING PROGRAM

## 2022-03-29 PROCEDURE — 87210 SMEAR WET MOUNT SALINE/INK: CPT | Mod: ZL | Performed by: STUDENT IN AN ORGANIZED HEALTH CARE EDUCATION/TRAINING PROGRAM

## 2022-03-29 PROCEDURE — 99212 OFFICE O/P EST SF 10 MIN: CPT | Performed by: STUDENT IN AN ORGANIZED HEALTH CARE EDUCATION/TRAINING PROGRAM

## 2022-03-29 PROCEDURE — 87389 HIV-1 AG W/HIV-1&-2 AB AG IA: CPT | Mod: ZL | Performed by: STUDENT IN AN ORGANIZED HEALTH CARE EDUCATION/TRAINING PROGRAM

## 2022-03-29 NOTE — PROGRESS NOTES
"Patient here for STD testing and would like to discuss birth control options.     FOOD SECURITY SCREENING QUESTIONS:    The next two questions are to help us understand your food security.  If you are feeling you need any assistance in this area, we have resources available to support you today.    Hunger Vital Signs:  Within the past 12 months we worried whether our food would run out before we got money to buy more. Never  Within the past 12 months the food we bought just didn't last and we didn't have money to get more. Never    Chief Complaint   Patient presents with     Follow Up     STD testing       Initial /80 (BP Location: Right arm, Patient Position: Sitting, Cuff Size: Adult Regular)   Pulse 86   Resp 16   LMP 02/27/2022 (Exact Date)   SpO2 99%   Breastfeeding No  Estimated body mass index is 40.62 kg/m  as calculated from the following:    Height as of 4/15/21: 1.575 m (5' 2\").    Weight as of 11/18/21: 100.7 kg (222 lb 1.6 oz).  Medication Reconciliation: complete    Fadumo Cardenas RN    "

## 2022-03-29 NOTE — PROGRESS NOTES
Follow-Up Visit    S: Ms. Clotilde Dorantes is a 21 year old  here for STI testing. She notes she has a new partner and just wants to be checked to be sure she is safe. She denies any symptoms, discharge or concerns.    We reviewed the CDC contraception table with all appropriate options and efficacies.   In reviewing her medical history to ensure combined-hormonal contraceptive options are safe for her:  She does not have hypertension. BP today was 120/80 mmHg  She does not have a have history of migraine with aura  She has never had any VTE  She does not know of any familial thrombophilias  She has never had a personal history of breast or endometrial cancer  She is not currently breastfeeding    O:  /80 (BP Location: Right arm, Patient Position: Sitting, Cuff Size: Adult Regular)   Pulse 86   Resp 16   LMP 2022 (Exact Date)   SpO2 99%   Breastfeeding No     Gen: Well-appearing, NAD  Pulm: nonlabored    Pelvic:  Normal appearing external female genitalia. Normal hair distribution. Vagina is without lesions. No vaginal discharge. Cervix parous, no lesions, no cervical motion tenderness. Uterus is small, mobile, non-tender. No adnexal tenderness or masses    A/P:  Ms. Clotilde Dorantes is a 21 year old  here for STI testing    # STI testing  -- wet prep, GC/Chlam, HIV, Hep C, RPR tested today    # Contraception  -- She is considering IUD- will make an appt for Mirena IUD if she desires    Will follow results and call with next steps if any are necessary    Total amount of time spent during today's encounter was 15 minutes  JOSE JASSO MD on 3/29/2022 at 4:07 PM

## 2022-03-31 LAB
HCV AB SERPL QL IA: NONREACTIVE
HIV 1+2 AB+HIV1 P24 AG SERPL QL IA: NONREACTIVE
T PALLIDUM AB SER QL: NONREACTIVE

## 2022-04-19 ENCOUNTER — OFFICE VISIT (OUTPATIENT)
Dept: OBGYN | Facility: OTHER | Age: 22
End: 2022-04-19
Attending: STUDENT IN AN ORGANIZED HEALTH CARE EDUCATION/TRAINING PROGRAM
Payer: COMMERCIAL

## 2022-04-19 VITALS
BODY MASS INDEX: 40.24 KG/M2 | WEIGHT: 220 LBS | HEART RATE: 86 BPM | DIASTOLIC BLOOD PRESSURE: 68 MMHG | SYSTOLIC BLOOD PRESSURE: 118 MMHG

## 2022-04-19 DIAGNOSIS — Z30.430 ENCOUNTER FOR INSERTION OF INTRAUTERINE CONTRACEPTIVE DEVICE (IUD): Primary | ICD-10-CM

## 2022-04-19 DIAGNOSIS — Z01.812 PRE-PROCEDURE LAB EXAM: ICD-10-CM

## 2022-04-19 LAB — HCG UR QL: NEGATIVE

## 2022-04-19 PROCEDURE — 250N000011 HC RX IP 250 OP 636: Performed by: STUDENT IN AN ORGANIZED HEALTH CARE EDUCATION/TRAINING PROGRAM

## 2022-04-19 PROCEDURE — 58300 INSERT INTRAUTERINE DEVICE: CPT | Performed by: STUDENT IN AN ORGANIZED HEALTH CARE EDUCATION/TRAINING PROGRAM

## 2022-04-19 PROCEDURE — G0463 HOSPITAL OUTPT CLINIC VISIT: HCPCS | Mod: 25 | Performed by: STUDENT IN AN ORGANIZED HEALTH CARE EDUCATION/TRAINING PROGRAM

## 2022-04-19 PROCEDURE — 81025 URINE PREGNANCY TEST: CPT | Mod: ZL | Performed by: STUDENT IN AN ORGANIZED HEALTH CARE EDUCATION/TRAINING PROGRAM

## 2022-04-19 PROCEDURE — 99213 OFFICE O/P EST LOW 20 MIN: CPT | Mod: 25 | Performed by: STUDENT IN AN ORGANIZED HEALTH CARE EDUCATION/TRAINING PROGRAM

## 2022-04-19 RX ADMIN — LEVONORGESTREL 20 MCG: 52 INTRAUTERINE DEVICE INTRAUTERINE at 11:54

## 2022-04-19 NOTE — PROGRESS NOTES
OBGYN OFFICE VISIT    Chief Complaint: Contraception    HPI:  Ms. Dorantes is a 21year old  who presents to clinic today to discuss contraception options.    We reviewed the CDC contraception table with all appropriate options and efficacies.   In reviewing her medical history to ensure combined-hormonal contraceptive options are safe for her:  She does not have hypertension. BP today was 118/68 mmHg  She does not have a have history of migraine with aura  She has never had any VTE  She does not know of any familial thrombophilias  She has never had a personal history of breast or endometrial cancer   She is not currently breastfeeding    After discussion of each option including OCPs, patch, vaginal ring, DepoProvera injection, Nexplanon implant or one of the IUDs she has decided she would like to use Mirena IUD.    LMP: 4/10/2022  UPT today negative     Past Medical History:  Past Medical History:   Diagnosis Date     MTHFR gene mutation        Past Surgical History:  Past Surgical History:   Procedure Laterality Date     wisdom teeth         Medications:  Current Outpatient Medications   Medication     ferrous sulfate (FEROSUL) 325 (65 Fe) MG tablet     Misc. Devices (BREAST PUMP) MISC     Prenatal Vit-Fe Fumarate-FA (PRENATAL MULTIVITAMIN W/IRON) 27-0.8 MG tablet     No current facility-administered medications for this visit.       Allergies:     Allergies   Allergen Reactions     Tramadol Other (See Comments)     Ankle swelling and pain       OB history:  OB History    Para Term  AB Living   1 1 1 0 0 1   SAB IAB Ectopic Multiple Live Births   0 0 0 0 1      # Outcome Date GA Lbr Jerome/2nd Weight Sex Delivery Anes PTL Lv   1 Term 22 40w6d 11:18 / 03:06 3.547 kg (7 lb 13.1 oz) M Vag-Vacuum EPI N JESSICA      Complications: Dysfunctional Labor      Name: JEANETH,MALE-CHHAYA      Apgar1: 7  Apgar5: 8         Gyn history:  LMP: 4/10/2022  First pap smear due 2022  Currently gets  menses q 30-35 days, they last for 7-10 days    Exam  /68   Pulse 86   Wt 99.8 kg (220 lb)   LMP 04/10/2022   Breastfeeding No   BMI 40.24 kg/m    Gen: Well-appearing, no acute distressed, well-groomed, alert  HEENT: Normocephalic, atraumatic  Cardiovascular: Regular rate. No peripheral edema, normal peripheral circulation  Pulm: Symmetric chest rise, non-labored respirations  Abd: Soft, non-tender, non-distended  Ext: No LE edema, extremities warm and well perfused  Pelvic:  Normal appearing external female genitalia. Normal hair distribution. Vagina is without lesions with moist, pink ruggae. There is no vaginal discharge. Cervix parous, no lesions, no cervical motion tenderness. Uterus is approximately 7cm, mobile, non-tender. No adnexal tenderness or masses    Mirena Insertion Note:  After the risks and benefits of the procedure were discussed with the patient, informed consent was obtained.  Risks were discussed including, but not limited to, bleeding, cramping, infection, uterine perforation and expulsion.  A bimanual exam was performed to reveal anteverted uterus. The speculum was gently introduced to visualize the cervix. The cervix was cleaned with betadine swabs and a single-tooth tenaculum was placed at the 12 o'clock position. The uterine sound was used to provide measurement.  The Mirena IUD insertion device was set up according to the utering sound measurement, loaded and placed through the cervical canal until gently able to reach the uterine fundus.  The application device was pulled back approximately 1.5 cm to allow for appropriate IUD arm release.  The IUD was then again gently pushed to the uterine fundus and the applicator withdrawn. The IUD strings were trimmed to 2 to 3 cm from the external cervical os.  After removal of all instruments a small amount of continued oozing was noted from the tenaculum sites.  Hemostasis was achieved with the use of silver nitrate sticks.  No further  bleeding was noted after application of pressure at the sites.  She tolerated the procedure well.      Mirena Lot #: [id number]  Exp: 2024  --------------------------------------------    Assessment & Plan:  Ms. Dorantes is a 21 year old year old  here for IUD insertion.    # Mirena IUD insertion  --  Uncomplicated insertion as noted above  -- Will return in 4 weeks for IUD string check  -- Plan or removal on or before 2027    Plan to return to clinic in 2022 for first pap smear (age 22)    JOSE JASSO MD on 2022 at 11:22 AM

## 2022-04-19 NOTE — NURSING NOTE
Pt presents to clinic today for IUD placement.      Medication Reconciliation: complete  Alayna Crump LPN

## 2022-04-27 ENCOUNTER — OFFICE VISIT (OUTPATIENT)
Dept: FAMILY MEDICINE | Facility: OTHER | Age: 22
End: 2022-04-27
Attending: PHYSICIAN ASSISTANT
Payer: COMMERCIAL

## 2022-04-27 VITALS
HEART RATE: 84 BPM | WEIGHT: 218.1 LBS | BODY MASS INDEX: 39.89 KG/M2 | SYSTOLIC BLOOD PRESSURE: 118 MMHG | TEMPERATURE: 99.2 F | OXYGEN SATURATION: 98 % | DIASTOLIC BLOOD PRESSURE: 80 MMHG | RESPIRATION RATE: 12 BRPM

## 2022-04-27 DIAGNOSIS — J02.9 SORETHROAT: ICD-10-CM

## 2022-04-27 DIAGNOSIS — J03.90 EXUDATIVE TONSILLITIS: Primary | ICD-10-CM

## 2022-04-27 LAB — GROUP A STREP BY PCR: NOT DETECTED

## 2022-04-27 PROCEDURE — G0463 HOSPITAL OUTPT CLINIC VISIT: HCPCS

## 2022-04-27 PROCEDURE — 99213 OFFICE O/P EST LOW 20 MIN: CPT | Performed by: NURSE PRACTITIONER

## 2022-04-27 PROCEDURE — 87651 STREP A DNA AMP PROBE: CPT | Mod: ZL | Performed by: NURSE PRACTITIONER

## 2022-04-27 ASSESSMENT — PAIN SCALES - GENERAL: PAINLEVEL: MODERATE PAIN (4)

## 2022-04-27 NOTE — PROGRESS NOTES
ASSESSMENT/PLAN:     I have reviewed the nursing notes.  I have reviewed the findings, diagnosis, plan and need for follow up with the patient.      1. Sorethroat    - Group A Streptococcus PCR Throat Swab    2. Exudative tonsillitis    Negative strep PCR test     Discussed with patient that symptoms and exam are consistent with viral illness.    No clinical indications for antibiotic treatment at this time.      Symptomatic treatment - Encouraged fluids, salt water gargles, honey, elevation, humidifier, lozenges, tea, soup, smoothies, popsicles, topical vapor rub, rest, etc     May use over-the-counter Tylenol or ibuprofen PRN    Discussed warning signs/symptoms indicative of need to f/u  Follow up if symptoms persist or worsen or concerns      I explained my diagnostic considerations and recommendations to the patient, who voiced understanding and agreement with the treatment plan. All questions were answered. We discussed potential side effects of any prescribed or recommended therapies, as well as expectations for response to treatments.    Lucia Fisher NP  [facility] AND [facility]      SUBJECTIVE:   Clotilde Dorantes is a 21 year old female who presents to clinic today for the following health issues:  Sore throat and ear pain    HPI  Sore throat for the past 2 days.  Painful to swallow.  Right ear with mild pain starting today.    No fevers, chills or sweats.  Chronic frequent headaches.  No nasal drainage or congestion.  No cough.  No chest tightness, heaviness or shortness of breath.  Appetite decreased the past 2 days.  No nausea or vomiting.  Energy at baseline.  No OTC medications or treatments        Past Medical History:   Diagnosis Date     MTHFR gene mutation      Past Surgical History:   Procedure Laterality Date     wisdom teeth       Social History     Tobacco Use     Smoking status: Current Every Day Smoker     Packs/day: 0.25     Types: Cigarettes     Smokeless tobacco: Never Used    Substance Use Topics     Alcohol use: Not Currently     Comment: occ     Current Outpatient Medications   Medication Sig Dispense Refill     ferrous sulfate (FEROSUL) 325 (65 Fe) MG tablet Take 1 tablet (325 mg) by mouth daily (with breakfast) 60 tablet 3     Misc. Devices (BREAST PUMP) MISC Breast Pump for home use.  Reason:  Separation from baby due to work/school.  CYNDEE: 99.  Dx:  Z39.10. (Patient taking differently: Breast Pump for home use.  Reason:  Separation from baby due to work/school.  CYNDEE: 99.  Dx:  Z39.10.) 1 each 0     Prenatal Vit-Fe Fumarate-FA (PRENATAL MULTIVITAMIN W/IRON) 27-0.8 MG tablet Take 1 tablet by mouth daily       Allergies   Allergen Reactions     Tramadol Other (See Comments)     Ankle swelling and pain         Past medical history, past surgical history, current medications and allergies reviewed and accurate to the best of my knowledge.        OBJECTIVE:     /80 (BP Location: Right arm, Patient Position: Sitting, Cuff Size: Adult Large)   Pulse 84   Temp 99.2  F (37.3  C) (Tympanic)   Resp 12   Wt 98.9 kg (218 lb 1.6 oz)   LMP 04/10/2022   SpO2 98%   Breastfeeding No   BMI 39.89 kg/m    Body mass index is 39.89 kg/m .     Physical Exam  General Appearance: Well appearing adolescent female, appropriate appearance for age. No acute distress  Ears: Left TM intact with bony landmarks appreciated, no erythema, no effusion, no bulging, no purulence.  Right TM intact with bony landmarks appreciated, no erythema, no effusion, no bulging, no purulence.  Left auditory canal clear without drainage or bleeding.  Right auditory canal clear without drainage or bleeding.  Normal external ears, non tender.  Eyes: conjunctivae normal without erythema or irritation, corneas clear, no drainage or crusting, no eyelid swelling, pupils equal   Orophayrnx: moist mucous membranes, pharynx with erythema, tonsils with 2-3+ hypertrophy, tonsils with bright erythema and diffuse white tonsillar  exudates, no oral lesions, no palate petechiae, no post nasal drip seen, no trismus, voice clear.    Nose:  No noted drainage or congestion   Neck: bilateral tonsillar and anterior cervical lymph node enlargement and tenderness.  No posterior lymph node enlargement.    Respiratory: normal chest wall and respirations.  Normal effort.  Clear to auscultation bilaterally, no wheezing, crackles or rhonchi.  No increased work of breathing.  No cough appreciated.  Cardiac: RRR with no murmurs  Musculoskeletal:  Equal movement of bilateral upper extremities.  Equal movement of bilateral lower extremities.  Normal gait.   Psychological: normal affect, alert, oriented, and pleasant.       Labs:  Results for orders placed or performed in visit on 04/27/22   Group A Streptococcus PCR Throat Swab     Status: Normal    Specimen: Throat; Swab   Result Value Ref Range    Group A strep by PCR Not Detected Not Detected    Narrative    The Xpert Xpress Strep A test, performed on the [employer]  [employer]ument Systems, is a rapid, qualitative in vitro diagnostic test for the detection of Streptococcus pyogenes (Group A ß-hemolytic Streptococcus, Strep A) in throat swab specimens from patients with signs and symptoms of pharyngitis. The Xpert Xpress Strep A test can be used as an aid in the diagnosis of Group A Streptococcal pharyngitis. The assay is not intended to monitor treatment for Group A Streptococcus infections. The Xpert Xpress Strep A test utilizes an automated real-time polymerase chain reaction (PCR) to detect Streptococcus pyogenes DNA.

## 2022-04-27 NOTE — NURSING NOTE
"Chief Complaint   Patient presents with     Pharyngitis     And swollen tonsils     Patient presents to  with the s/s stated above. Patient stated her throat has been sore for a couple days. Does have a little pain in her right ear. She stated her baby was Dx with bronchiolitis and an ear infection.    Initial /80 (BP Location: Right arm, Patient Position: Sitting, Cuff Size: Adult Large)   Pulse 84   Temp 99.2  F (37.3  C) (Tympanic)   Resp 12   Wt 98.9 kg (218 lb 1.6 oz)   LMP 04/10/2022   SpO2 98%   Breastfeeding No   BMI 39.89 kg/m   Estimated body mass index is 39.89 kg/m  as calculated from the following:    Height as of 4/15/21: 1.575 m (5' 2\").    Weight as of this encounter: 98.9 kg (218 lb 1.6 oz).  Medication Reconciliation: Completed     Advanced Care Directive Reviewed    Sadi Dangelo LPN  "

## 2022-05-18 ENCOUNTER — OFFICE VISIT (OUTPATIENT)
Dept: OBGYN | Facility: OTHER | Age: 22
End: 2022-05-18
Attending: STUDENT IN AN ORGANIZED HEALTH CARE EDUCATION/TRAINING PROGRAM
Payer: COMMERCIAL

## 2022-05-18 VITALS
HEART RATE: 104 BPM | BODY MASS INDEX: 40.06 KG/M2 | WEIGHT: 219 LBS | DIASTOLIC BLOOD PRESSURE: 76 MMHG | SYSTOLIC BLOOD PRESSURE: 120 MMHG

## 2022-05-18 DIAGNOSIS — Z30.431 IUD CHECK UP: Primary | ICD-10-CM

## 2022-05-18 PROCEDURE — G0463 HOSPITAL OUTPT CLINIC VISIT: HCPCS | Performed by: STUDENT IN AN ORGANIZED HEALTH CARE EDUCATION/TRAINING PROGRAM

## 2022-05-18 PROCEDURE — 99212 OFFICE O/P EST SF 10 MIN: CPT | Performed by: STUDENT IN AN ORGANIZED HEALTH CARE EDUCATION/TRAINING PROGRAM

## 2022-05-18 NOTE — NURSING NOTE
Pt presents to clinic today for IUD check.      Medication Reconciliation: complete  Alayna Crump LPN

## 2022-05-18 NOTE — PROGRESS NOTES
Follow-Up Visit    S: Ms. Dorantes is a 21 year old  here for IUD follow up and string check. She had a Mirena IUD placed without difficulty on 2022. She notes she has been tolerating the IUD since placement. She notes her bleeding pattern has been light but irregular. She endorses mild cramping that has improved since placement.    O:  /76   Pulse 104   Wt 99.3 kg (219 lb)   LMP 2022   Breastfeeding No   BMI 40.06 kg/m      Gen: Well-appearing, NAD  Pulm: nonlabored  Abd: Soft, non-tender, non-distended  Ext: No LE edema, extremities warm and well perfused    Pelvic:  Normal appearing external female genitalia. Normal hair distribution. Vagina is without lesions. No vaginal discharge. Cervix parous, no lesions, no cervical motion tenderness. IUD strings noted at the external os. Uterus is small, mobile, non-tender. No adnexal tenderness or masses    A/P:  Ms. Dorantes is a 21 year old  here for IUD string check.  - Mirena IUD placed without difficulty on 2022   Needs to be removed on or before 2027.  - Doing well and can follow up as needed for any future concerns.    Total amount of time spent during today's encounter including chart prep, face to face, documentation was 10 minutes  JOSE JASSO MD on 2022 at 2:14 PM

## 2022-06-08 ENCOUNTER — APPOINTMENT (OUTPATIENT)
Dept: GENERAL RADIOLOGY | Facility: OTHER | Age: 22
End: 2022-06-08
Attending: FAMILY MEDICINE
Payer: COMMERCIAL

## 2022-06-08 ENCOUNTER — HOSPITAL ENCOUNTER (EMERGENCY)
Facility: OTHER | Age: 22
Discharge: HOME | End: 2022-06-08
Attending: FAMILY MEDICINE | Admitting: FAMILY MEDICINE
Payer: COMMERCIAL

## 2022-06-08 VITALS
DIASTOLIC BLOOD PRESSURE: 70 MMHG | BODY MASS INDEX: 40.24 KG/M2 | HEART RATE: 100 BPM | OXYGEN SATURATION: 95 % | WEIGHT: 220 LBS | RESPIRATION RATE: 20 BRPM | SYSTOLIC BLOOD PRESSURE: 153 MMHG | TEMPERATURE: 99.9 F

## 2022-06-08 DIAGNOSIS — U07.1 INFECTION DUE TO 2019 NOVEL CORONAVIRUS: ICD-10-CM

## 2022-06-08 LAB
ALBUMIN SERPL-MCNC: 4.5 G/DL (ref 3.5–5.7)
ALP SERPL-CCNC: 38 U/L (ref 34–104)
ALT SERPL W P-5'-P-CCNC: 13 U/L (ref 7–52)
ANION GAP SERPL CALCULATED.3IONS-SCNC: 10 MMOL/L (ref 3–14)
AST SERPL W P-5'-P-CCNC: 12 U/L (ref 13–39)
BASOPHILS # BLD AUTO: 0 10E3/UL (ref 0–0.2)
BASOPHILS NFR BLD AUTO: 0 %
BILIRUB SERPL-MCNC: 0.7 MG/DL (ref 0.3–1)
BUN SERPL-MCNC: 14 MG/DL (ref 7–25)
CALCIUM SERPL-MCNC: 8.8 MG/DL (ref 8.6–10.3)
CHLORIDE BLD-SCNC: 102 MMOL/L (ref 98–107)
CO2 SERPL-SCNC: 24 MMOL/L (ref 21–31)
CREAT SERPL-MCNC: 0.83 MG/DL (ref 0.6–1.2)
EOSINOPHIL # BLD AUTO: 0 10E3/UL (ref 0–0.7)
EOSINOPHIL NFR BLD AUTO: 0 %
ERYTHROCYTE [DISTWIDTH] IN BLOOD BY AUTOMATED COUNT: 16.2 % (ref 10–15)
FLUAV RNA SPEC QL NAA+PROBE: NEGATIVE
FLUBV RNA RESP QL NAA+PROBE: NEGATIVE
GFR SERPL CREATININE-BSD FRML MDRD: >90 ML/MIN/1.73M2
GLUCOSE BLD-MCNC: 96 MG/DL (ref 70–105)
HCT VFR BLD AUTO: 34.7 % (ref 35–47)
HGB BLD-MCNC: 11.4 G/DL (ref 11.7–15.7)
IMM GRANULOCYTES # BLD: 0 10E3/UL
IMM GRANULOCYTES NFR BLD: 0 %
LYMPHOCYTES # BLD AUTO: 0.3 10E3/UL (ref 0.8–5.3)
LYMPHOCYTES NFR BLD AUTO: 4 %
MCH RBC QN AUTO: 25.1 PG (ref 26.5–33)
MCHC RBC AUTO-ENTMCNC: 32.9 G/DL (ref 31.5–36.5)
MCV RBC AUTO: 76 FL (ref 78–100)
MONOCYTES # BLD AUTO: 0.5 10E3/UL (ref 0–1.3)
MONOCYTES NFR BLD AUTO: 5 %
NEUTROPHILS # BLD AUTO: 8.7 10E3/UL (ref 1.6–8.3)
NEUTROPHILS NFR BLD AUTO: 91 %
NRBC # BLD AUTO: 0 10E3/UL
NRBC BLD AUTO-RTO: 0 /100
PLATELET # BLD AUTO: 316 10E3/UL (ref 150–450)
POTASSIUM BLD-SCNC: 3.5 MMOL/L (ref 3.5–5.1)
PROT SERPL-MCNC: 7.3 G/DL (ref 6.4–8.9)
RBC # BLD AUTO: 4.54 10E6/UL (ref 3.8–5.2)
RSV RNA SPEC NAA+PROBE: NEGATIVE
SARS-COV-2 RNA RESP QL NAA+PROBE: POSITIVE
SODIUM SERPL-SCNC: 136 MMOL/L (ref 134–144)
WBC # BLD AUTO: 9.6 10E3/UL (ref 4–11)

## 2022-06-08 PROCEDURE — 80053 COMPREHEN METABOLIC PANEL: CPT | Performed by: FAMILY MEDICINE

## 2022-06-08 PROCEDURE — 71045 X-RAY EXAM CHEST 1 VIEW: CPT

## 2022-06-08 PROCEDURE — 85004 AUTOMATED DIFF WBC COUNT: CPT | Performed by: FAMILY MEDICINE

## 2022-06-08 PROCEDURE — 36415 COLL VENOUS BLD VENIPUNCTURE: CPT | Performed by: FAMILY MEDICINE

## 2022-06-08 PROCEDURE — C9803 HOPD COVID-19 SPEC COLLECT: HCPCS | Performed by: FAMILY MEDICINE

## 2022-06-08 PROCEDURE — 99284 EMERGENCY DEPT VISIT MOD MDM: CPT | Mod: 25 | Performed by: FAMILY MEDICINE

## 2022-06-08 PROCEDURE — 87637 SARSCOV2&INF A&B&RSV AMP PRB: CPT | Performed by: FAMILY MEDICINE

## 2022-06-08 PROCEDURE — 99284 EMERGENCY DEPT VISIT MOD MDM: CPT | Performed by: FAMILY MEDICINE

## 2022-06-08 ASSESSMENT — ENCOUNTER SYMPTOMS
NAUSEA: 1
COUGH: 1
HEADACHES: 1
SORE THROAT: 1
VOMITING: 1

## 2022-06-08 NOTE — LETTER
June 8, 2022      To Whom It May Concern:      Clotilde Dorantes was seen in our Emergency Department today, 06/08/22.  She is COVID positive.  I expect her condition to improve over the next 5 - 10 days.  She may return to work according to work place protocol regarding COVID.     Sincerely,                Amor Radford MD

## 2022-06-08 NOTE — ED PROVIDER NOTES
History     Chief Complaint   Patient presents with     Covid Concern     The history is provided by the patient and medical records.     Clotilde Dorantes is a 21 year old female here with sore throat, headache, feeling hot and cold, cough and vomiting that started yesterday. She has been feeling worse today.     She is not vaccinated against COVID.     Allergies:  Allergies   Allergen Reactions     Tramadol Other (See Comments)     Ankle swelling and pain       Problem List:    Patient Active Problem List    Diagnosis Date Noted     Anemia due to blood loss, acute 09/17/2021     Priority: Medium     Rupture of membranes with delay of delivery 09/16/2021     Priority: Medium     Encounter for triage in pregnant patient 05/11/2021     Priority: Medium        Past Medical History:    Past Medical History:   Diagnosis Date     MTHFR gene mutation        Past Surgical History:    Past Surgical History:   Procedure Laterality Date     wisdom teeth         Family History:    Family History   Problem Relation Age of Onset     Genetic Disorder Mother         MTHFR mutation     Pulmonary Embolism Mother         2018, no issues during pregnancy     Thyroid Cancer Father      Heart Disease Maternal Grandmother      Diabetes Maternal Grandfather        Social History:  Marital Status:  Single [1]  Social History     Tobacco Use     Smoking status: Current Every Day Smoker     Packs/day: 0.25     Types: Cigarettes     Smokeless tobacco: Never Used   Vaping Use     Vaping Use: Never used   Substance Use Topics     Alcohol use: Not Currently     Comment: occ     Drug use: Not Currently     Types: Marijuana     Comment: Has not used marijuana during pregnancy        Medications:    No current outpatient medications on file.        Review of Systems   HENT: Positive for sore throat.    Respiratory: Positive for cough.    Gastrointestinal: Positive for nausea and vomiting.   Neurological: Positive for headaches.   All other  systems reviewed and are negative.      Physical Exam   BP: (!) 153/70  Pulse: 100  Temp: 99.9  F (37.7  C)  Resp: 20  Weight: 99.8 kg (220 lb)  SpO2: 95 %      Physical Exam  Vitals and nursing note reviewed.   Constitutional:       General: She is not in acute distress.     Appearance: Normal appearance. She is obese. She is not ill-appearing.   HENT:      Right Ear: External ear normal.      Left Ear: External ear normal.      Nose: Nose normal.   Cardiovascular:      Rate and Rhythm: Normal rate and regular rhythm.      Pulses: Normal pulses.      Heart sounds: Normal heart sounds. No murmur heard.  Pulmonary:      Effort: Pulmonary effort is normal. No respiratory distress.      Breath sounds: Normal breath sounds.   Abdominal:      General: Bowel sounds are normal.      Palpations: Abdomen is soft.      Tenderness: There is no abdominal tenderness.   Skin:     General: Skin is warm and dry.   Neurological:      General: No focal deficit present.      Mental Status: She is alert and oriented to person, place, and time.         Results for orders placed or performed during the hospital encounter of 06/08/22 (from the past 24 hour(s))   Symptomatic; Yes; 6/7/2022 Influenza A/B & SARS-CoV2 (COVID-19) Virus PCR Multiplex Nose    Specimen: Nose; Swab   Result Value Ref Range    Influenza A PCR Negative Negative    Influenza B PCR Negative Negative    RSV PCR Negative Negative    SARS CoV2 PCR Positive (A) Negative    Narrative    Testing was performed using the Xpert Xpress CoV2/Flu/RSV Assay on the [employer] GeneXpert Instrument. This test should be ordered for the detection of SARS-CoV-2 and influenza viruses in individuals who meet clinical and/or epidemiological criteria. Test performance is unknown in asymptomatic patients. This test is for in vitro diagnostic use under the FDA EUA for laboratories certified under CLIA to perform high or moderate complexity testing. This test has not been FDA cleared or approved.  A negative result does not rule out the presence of PCR inhibitors in the specimen or target RNA in concentration below the limit of detection for the assay. If only one viral target is positive but coinfection with multiple targets is suspected, the sample should be re-tested with another FDA cleared, approved, or authorized test, if coinfection would change clinical management. This test was validated by the [facility]. These laboratories are certified under the Clinical  Laboratory Improvement Amendments of 1988 (CLIA-88) as qualified to perform high complexity laboratory testing.   CBC with platelets differential    Narrative    The following orders were created for panel order CBC with platelets differential.  Procedure                               Abnormality         Status                     ---------                               -----------         ------                     CBC with platelets and d...[[id number]]  Abnormal            Final result                 Please view results for these tests on the individual orders.   Comprehensive metabolic panel   Result Value Ref Range    Sodium 136 134 - 144 mmol/L    Potassium 3.5 3.5 - 5.1 mmol/L    Chloride 102 98 - 107 mmol/L    Carbon Dioxide (CO2) 24 21 - 31 mmol/L    Anion Gap 10 3 - 14 mmol/L    Urea Nitrogen 14 7 - 25 mg/dL    Creatinine 0.83 0.60 - 1.20 mg/dL    Calcium 8.8 8.6 - 10.3 mg/dL    Glucose 96 70 - 105 mg/dL    Alkaline Phosphatase 38 34 - 104 U/L    AST 12 (L) 13 - 39 U/L    ALT 13 7 - 52 U/L    Protein Total 7.3 6.4 - 8.9 g/dL    Albumin 4.5 3.5 - 5.7 g/dL    Bilirubin Total 0.7 0.3 - 1.0 mg/dL    GFR Estimate >90 >60 mL/min/1.73m2   CBC with platelets and differential   Result Value Ref Range    WBC Count 9.6 4.0 - 11.0 10e3/uL    RBC Count 4.54 3.80 - 5.20 10e6/uL    Hemoglobin 11.4 (L) 11.7 - 15.7 g/dL    Hematocrit 34.7 (L) 35.0 - 47.0 %    MCV 76 (L) 78 - 100 fL    MCH 25.1 (L) 26.5 - 33.0 pg    MCHC 32.9 31.5 -  36.5 g/dL    RDW 16.2 (H) 10.0 - 15.0 %    Platelet Count 316 150 - 450 10e3/uL    % Neutrophils 91 %    % Lymphocytes 4 %    % Monocytes 5 %    % Eosinophils 0 %    % Basophils 0 %    % Immature Granulocytes 0 %    NRBCs per 100 WBC 0 <1 /100    Absolute Neutrophils 8.7 (H) 1.6 - 8.3 10e3/uL    Absolute Lymphocytes 0.3 (L) 0.8 - 5.3 10e3/uL    Absolute Monocytes 0.5 0.0 - 1.3 10e3/uL    Absolute Eosinophils 0.0 0.0 - 0.7 10e3/uL    Absolute Basophils 0.0 0.0 - 0.2 10e3/uL    Absolute Immature Granulocytes 0.0 <=0.4 10e3/uL    Absolute NRBCs 0.0 10e3/uL   XR Chest Port 1 View    Narrative    PROCEDURE:  XR CHEST PORT 1 VIEW    HISTORY: cough, COVID+. .    COMPARISON:  9/17/2019    FINDINGS:    The cardiomediastinal contours are magnified by portable technique.  Habitus limits penetration. Consider PA and lateral views. No evidence  of focal consolidation, effusion or pneumothorax.      Impression    IMPRESSION:  Stable portable chest.      DONALDO KUMARI MD         SYSTEM ID:  [id number]         Assessments & Plan (with Medical Decision Making)  Clotilde Dorantes is a 21 year old female here with sore throat, headache, feeling hot and cold, cough and vomiting that started yesterday. She has been feeling worse today.  She is not vaccinated against COVID.  VS in the ED on room air BP (!) 153/70   Pulse 100   Temp 99.9  F (37.7  C) (Tympanic)   Resp 20   Wt 99.8 kg (220 lb)   LMP 05/14/2022   SpO2 95%   BMI 40.24 kg/m    Exam shows no focal findings.  Labs show CBC with hgb 11.4, CMP normal, 4 Plex positive for COVID.  Chest xray stable.  She can go home with routine follow up if she gets worse.      I have reviewed the nursing notes.    I have reviewed the findings, diagnosis, plan and need for follow up with the patient.    Final diagnoses:   Infection due to 2019 novel coronavirus       6/8/2022   [facility] AND [facility], Amor Matthew MD  06/08/22 [id number]

## 2022-06-08 NOTE — ED TRIAGE NOTES
Patient sick starting yesterday. Cough, body aches, fever. Reports being exposed to covid. Is not vaccinated.      Triage Assessment     Row Name 06/08/22 0751       Triage Assessment (Adult)    Airway WDL WDL       Respiratory WDL    Respiratory WDL X;cough    Cough Frequency infrequent    Cough Type good       Skin Circulation/Temperature WDL    Skin Circulation/Temperature WDL WDL       Cardiac WDL    Cardiac WDL WDL       Peripheral/Neurovascular WDL    Peripheral Neurovascular WDL WDL       Cognitive/Neuro/Behavioral WDL    Cognitive/Neuro/Behavioral WDL WDL

## 2022-06-23 ENCOUNTER — OFFICE VISIT (OUTPATIENT)
Dept: FAMILY MEDICINE | Facility: OTHER | Age: 22
End: 2022-06-23
Attending: PHYSICIAN ASSISTANT
Payer: COMMERCIAL

## 2022-06-23 VITALS
BODY MASS INDEX: 39.47 KG/M2 | RESPIRATION RATE: 18 BRPM | SYSTOLIC BLOOD PRESSURE: 130 MMHG | HEART RATE: 83 BPM | OXYGEN SATURATION: 97 % | DIASTOLIC BLOOD PRESSURE: 70 MMHG | WEIGHT: 215.8 LBS | TEMPERATURE: 98 F

## 2022-06-23 DIAGNOSIS — J06.9 VIRAL URI: Primary | ICD-10-CM

## 2022-06-23 DIAGNOSIS — R09.81 NASAL CONGESTION: ICD-10-CM

## 2022-06-23 PROCEDURE — 99213 OFFICE O/P EST LOW 20 MIN: CPT | Performed by: NURSE PRACTITIONER

## 2022-06-23 PROCEDURE — G0463 HOSPITAL OUTPT CLINIC VISIT: HCPCS

## 2022-06-23 RX ORDER — FLUTICASONE PROPIONATE 50 MCG
2 SPRAY, SUSPENSION (ML) NASAL DAILY
Qty: 9.9 ML | Refills: 0 | Status: SHIPPED | OUTPATIENT
Start: 2022-06-23 | End: 2022-09-30

## 2022-06-23 NOTE — PROGRESS NOTES
ASSESSMENT/PLAN:    I have reviewed the nursing notes.  I have reviewed the findings, diagnosis, plan and need for follow up with the patient.    1. Viral URI  2. Nasal congestion  - fluticasone (FLONASE) 50 MCG/ACT nasal spray; Spray 2 sprays into both nostrils daily for 99 days  Dispense: 9.9 mL; Refill: 0  -Symptomatic treatment - Encouraged fluids, salt water gargles, honey (only if greater than 1 year in age due to risk of botulism), elevation, humidifier, sinus rinse/netti pot, lozenges, tea, topical vapor rub, popsicles, rest, etc   - May use over-the-counter Tylenol or ibuprofen PRN    Follow up if symptoms persist or worsen or concerns    I explained my diagnostic considerations and recommendations to the patient, who voiced understanding and agreement with the treatment plan. All questions were answered. We discussed potential side effects of any prescribed or recommended therapies, as well as expectations for response to treatments.    Park Bee NP  6/23/2022  12:31 PM    HPI:  Clotilde Dorantes is a 21 year old female who presents to [facility] today for concerns of illness, needing clearance for work/needs a note. She had covid on 6/8/2022. She has been more congested, runny nose in past few days. Woke up today, did not feel well enough to work.     She was fine for a week or so and then started having return of symptoms. Did not take antiviral medication.     No ongoing fevers. Does not have thermometer. No productive cough or shortness of breath. Her son was also ill.     Past Medical History:   Diagnosis Date     MTHFR gene mutation      Past Surgical History:   Procedure Laterality Date     wisdom teeth       Social History     Tobacco Use     Smoking status: Current Every Day Smoker     Packs/day: 0.25     Types: Cigarettes     Smokeless tobacco: Never Used   Substance Use Topics     Alcohol use: Not Currently     Comment: occ     No current outpatient medications on file.     Allergies    Allergen Reactions     Tramadol Other (See Comments)     Ankle swelling and pain     Past medical history, past surgical history, current medications and allergies reviewed and accurate to the best of my knowledge.      ROS:  Refer to HPI    /70 (BP Location: Left arm, Patient Position: Sitting, Cuff Size: Adult Large)   Pulse 83   Temp 98  F (36.7  C) (Tympanic)   Resp 18   Wt 97.9 kg (215 lb 12.8 oz)   LMP 05/14/2022   SpO2 97%   BMI 39.47 kg/m      EXAM:  General Appearance: Well appearing 21 year old female, appropriate appearance for age. No acute distress   Ears: Left TM intact, translucent with bony landmarks appreciated, no erythema, no effusion, no bulging, no purulence.  Right TM intact, translucent with bony landmarks appreciated, no erythema, no effusion, no bulging, no purulence.  Left auditory canal clear.  Right auditory canal clear.  Normal external ears, non tender.  Eyes: conjunctivae normal without erythema or irritation, corneas clear, no drainage or crusting, no eyelid swelling, pupils equal   Oropharynx: moist mucous membranes, posterior pharynx without erythema, tonsils symmetric, no erythema, no exudates or petechiae, voice clear.    Sinuses:  No sinus tenderness upon palpation of the frontal or maxillary sinuses  Nose:  Bilateral nares: no erythema, no edema, + nasal congestion   Neck: supple without adenopathy  Respiratory: normal chest wall and respirations.  Normal effort.  Clear to auscultation bilaterally, no wheezing, crackles or rhonchi.  No increased work of breathing.  No cough appreciated.  Cardiac: RRR with no murmurs  Psychological: normal affect, alert, oriented, and pleasant.

## 2022-06-23 NOTE — LETTER
June 23, 2022                                                                     To Whom it May Concern:    Clotilde Dorantes attended clinic here on Jun 23, 2022 and may return to work on Friday if her symptoms are improving. Please excuse for Wednesday and Thursday 6/22 and 6/23 and potentially 6/24 if not feeling better.         Sincerely,    Park Bee NP

## 2022-07-05 ENCOUNTER — MYC MEDICAL ADVICE (OUTPATIENT)
Dept: OBGYN | Facility: OTHER | Age: 22
End: 2022-07-05

## 2022-07-12 ENCOUNTER — OFFICE VISIT (OUTPATIENT)
Dept: OBGYN | Facility: OTHER | Age: 22
End: 2022-07-12
Attending: OBSTETRICS & GYNECOLOGY
Payer: COMMERCIAL

## 2022-07-12 VITALS
DIASTOLIC BLOOD PRESSURE: 80 MMHG | BODY MASS INDEX: 39.32 KG/M2 | HEART RATE: 72 BPM | SYSTOLIC BLOOD PRESSURE: 122 MMHG | WEIGHT: 215 LBS

## 2022-07-12 DIAGNOSIS — T83.32XA INTRAUTERINE CONTRACEPTIVE DEVICE THREADS LOST, INITIAL ENCOUNTER: ICD-10-CM

## 2022-07-12 DIAGNOSIS — Z97.5 BREAKTHROUGH BLEEDING WITH IUD: Primary | ICD-10-CM

## 2022-07-12 DIAGNOSIS — N92.1 BREAKTHROUGH BLEEDING WITH IUD: Primary | ICD-10-CM

## 2022-07-12 PROCEDURE — 99213 OFFICE O/P EST LOW 20 MIN: CPT | Performed by: OBSTETRICS & GYNECOLOGY

## 2022-07-12 PROCEDURE — G0463 HOSPITAL OUTPT CLINIC VISIT: HCPCS | Performed by: OBSTETRICS & GYNECOLOGY

## 2022-07-12 NOTE — PROGRESS NOTES
Follow-Up Visit    S: Ms. Clotilde Dorantes is a 21 year old  here for IUD check. She had a Mirena placed on 22. She reports almost daily bleeding since. She will have about 5-7 days per month of no bleeding randomly. When she gets her period, the bleeding is heavier but otherwise is light. Her boyfriend used to be able to feel her strings but has not been able to for about a month     O:  /80 (BP Location: Right arm, Patient Position: Sitting, Cuff Size: Adult Large)   Pulse 72   Wt 97.5 kg (215 lb)   LMP 2022 (Approximate)   Breastfeeding No   BMI 39.32 kg/m    Gen: Well-appearing, NAD  Pulm: nonlabored  Psych: appropriate mood and affect    Pelvic:  Normal appearing external female genitalia. Normal hair distribution. Vagina is without lesions. Small bloody discharge. Cervix normal, IUD strings not visible. Unable to sweep out with a cytobrush    A/P:  Ms. Clotilde Dorantes is a 21 year old  here for breakthrough bleeding with IUD. Will get pelvic ultrasound for location verification. Discussed backup contraception until proper positioning is confirmed.  If normally positioned, discussed trial of OCPs for the breakthrough bleeding, which she is agreeable to.    Claudia Cardenas MD  OB/GYN  2022 2:57 PM

## 2022-07-12 NOTE — NURSING NOTE
"  Chief Complaint   Patient presents with     Follow Up     Bleeding after intercourse, can't feel strings     Having light bleeding after intercourse, can last several days. Also spots sometimes without having intercourse first. She has not tried feeling her strings, but her boyfriend told her he no longer notices them.    Initial LMP 05/14/2022  Estimated body mass index is 39.47 kg/m  as calculated from the following:    Height as of 4/15/21: 1.575 m (5' 2\").    Weight as of 6/23/22: 97.9 kg (215 lb 12.8 oz).  Medication Reconciliation: complete    Analy Chan RN    "

## 2022-08-01 ENCOUNTER — HOSPITAL ENCOUNTER (OUTPATIENT)
Dept: ULTRASOUND IMAGING | Facility: OTHER | Age: 22
Discharge: HOME | End: 2022-08-01
Attending: OBSTETRICS & GYNECOLOGY | Admitting: OBSTETRICS & GYNECOLOGY
Payer: COMMERCIAL

## 2022-08-01 DIAGNOSIS — T83.32XA INTRAUTERINE CONTRACEPTIVE DEVICE THREADS LOST, INITIAL ENCOUNTER: ICD-10-CM

## 2022-08-01 PROCEDURE — 76830 TRANSVAGINAL US NON-OB: CPT

## 2022-09-18 ENCOUNTER — HEALTH MAINTENANCE LETTER (OUTPATIENT)
Age: 22
End: 2022-09-18

## 2022-12-09 ENCOUNTER — OFFICE VISIT (OUTPATIENT)
Dept: FAMILY MEDICINE | Facility: OTHER | Age: 22
End: 2022-12-09
Attending: PHYSICIAN ASSISTANT
Payer: COMMERCIAL

## 2022-12-09 VITALS
BODY MASS INDEX: 37.75 KG/M2 | OXYGEN SATURATION: 96 % | RESPIRATION RATE: 18 BRPM | TEMPERATURE: 98.5 F | HEART RATE: 86 BPM | WEIGHT: 206.4 LBS | SYSTOLIC BLOOD PRESSURE: 116 MMHG | DIASTOLIC BLOOD PRESSURE: 79 MMHG

## 2022-12-09 DIAGNOSIS — N64.4 BREAST PAIN, RIGHT: Primary | ICD-10-CM

## 2022-12-09 PROBLEM — D62 ANEMIA DUE TO BLOOD LOSS, ACUTE: Status: RESOLVED | Noted: 2021-09-17 | Resolved: 2022-12-09

## 2022-12-09 PROBLEM — O42.90 RUPTURE OF MEMBRANES WITH DELAY OF DELIVERY: Status: RESOLVED | Noted: 2021-09-16 | Resolved: 2022-12-09

## 2022-12-09 PROBLEM — Z36.89 ENCOUNTER FOR TRIAGE IN PREGNANT PATIENT: Status: RESOLVED | Noted: 2021-05-11 | Resolved: 2022-12-09

## 2022-12-09 PROCEDURE — G0463 HOSPITAL OUTPT CLINIC VISIT: HCPCS

## 2022-12-09 PROCEDURE — 99213 OFFICE O/P EST LOW 20 MIN: CPT | Performed by: PHYSICIAN ASSISTANT

## 2022-12-09 ASSESSMENT — PAIN SCALES - GENERAL: PAINLEVEL: SEVERE PAIN (6)

## 2022-12-09 NOTE — PROGRESS NOTES
Assessment & Plan   Problem List Items Addressed This Visit    None  Visit Diagnoses     Breast pain, right    -  Primary    Relevant Orders    US Breast Right Limited 1-3 Quadrants         Order right breast ultrasound to rule out concerns.  Encouraged to take ibuprofen or ibuprofen for relief up to 4 times per day.  #Encouraged to wear a good fitting sports bra or bra without an under wire.Encouraged to use ice or heat 15 minutes at a time several times per day to decrease pain. Return to clinic in 1-2 weeks as necessary for persistent pain. Return to clinic with any change or worsening of symptoms.      Nicotine/Tobacco Cessation:  She reports that she has been smoking cigarettes and vaping device. She started smoking about 9 months ago. She has a 0.50 pack-year smoking history. She has never used smokeless tobacco.  Nicotine/Tobacco Cessation Plan:   Information offered: Patient not interested at this time      See Patient Instructions    Return if symptoms worsen or fail to improve.    Bertha Zelaya PA-C  [facility] AND [facility]   Clotilde is a 22 year old, presenting for the following health issues:  Breast Pain (Right breast)      History of Present Illness       Reason for visit:  Pain in right breast  Symptom onset:  More than a month  Symptoms include:  Constant pain and i think lumps but im not sure  Symptom intensity:  Moderate  Symptom progression:  Worsening  Had these symptoms before:  No  What makes it worse:  Laying on my stomach and not wearing a bra  What makes it better:  Not really    She eats 0-1 servings of fruits and vegetables daily.She consumes 1 sweetened beverage(s) daily.She exercises with enough effort to increase her heart rate 9 or less minutes per day.  She exercises with enough effort to increase her heart rate 3 or less days per week.   She is taking medications regularly.       Pain History:  When did you first notice your pain? - Acute Pain   Have you seen  anyone else for your pain? No  Where in your body do you have pain? right breast    Over the last month patient's right breast has been aching.  Worse with not wearing a bra.  Has noticed some possible red splotchy areas however she is unsure if this occurred when she was pushing on the right breast.  Has had symptoms over the last 1 to 2 months.  No trauma or injury.  Not breast-feeding.  Stopped breast-feeding in March.  Paternal aunt has history of breast cancer in her late 40s.  Dad has history of thyroid cancer at the age of 23.  No breast discharge.  Possible lumps felt.    Review of Systems   Constitutional, HEENT, cardiovascular, pulmonary, gi and gu systems are negative, except as otherwise noted.      Objective    /79 (BP Location: Right arm, Patient Position: Sitting, Cuff Size: Adult Large)   Pulse 86   Temp 98.5  F (36.9  C) (Tympanic)   Resp 18   Wt 93.6 kg (206 lb 6.4 oz)   LMP 11/13/2022 (Approximate)   SpO2 96%   BMI 37.75 kg/m    Body mass index is 37.75 kg/m .  Physical Exam  Vitals and nursing note reviewed.   Constitutional:       Appearance: Normal appearance.   HENT:      Head: Normocephalic and atraumatic.   Cardiovascular:      Rate and Rhythm: Normal rate and regular rhythm.      Heart sounds: Normal heart sounds.   Pulmonary:      Effort: Pulmonary effort is normal.      Breath sounds: Normal breath sounds.   Chest:   Breasts:     Right: Normal. No swelling, mass, nipple discharge, skin change or tenderness.      Left: No swelling, mass, nipple discharge, skin change or tenderness.   Skin:     General: Skin is warm and dry.   Neurological:      General: No focal deficit present.      Mental Status: She is alert and oriented to person, place, and time.   Psychiatric:         Behavior: Behavior normal.

## 2022-12-09 NOTE — PATIENT INSTRUCTIONS
Encouraged to take ibuprofen or ibuprofen for relief up to 4 times per day.  #Encouraged to wear a good fitting sports bra or bra without an under wire.Encouraged to use ice or heat 15 minutes at a time several times per day to decrease pain. Return to clinic in 1-2 weeks as necessary for persistent pain. Return to clinic with any change or worsening of symptoms.

## 2022-12-09 NOTE — NURSING NOTE
Pt presents to clinic today for right breast pain and redness for the last 1-2 months now. No charges in the last 1-2 months.      FOOD SECURITY SCREENING QUESTIONS:    The next two questions are to help us understand your food security.  If you are feeling you need any assistance in this area, we have resources available to support you today.    Hunger Vital Signs:  Within the past 12 months we worried whether our food would run out before we got money to buy more. Never  Within the past 12 months the food we bought just didn't last and we didn't have money to get more. Never          Medication Reconciliation: complete  Daina Culp, LPN,LPN on 12/9/2022 at 10:55 AM

## 2023-01-29 ENCOUNTER — HEALTH MAINTENANCE LETTER (OUTPATIENT)
Age: 23
End: 2023-01-29

## 2023-03-06 ENCOUNTER — OFFICE VISIT (OUTPATIENT)
Dept: FAMILY MEDICINE | Facility: OTHER | Age: 23
End: 2023-03-06
Attending: NURSE PRACTITIONER
Payer: COMMERCIAL

## 2023-03-06 VITALS
TEMPERATURE: 98.4 F | BODY MASS INDEX: 36.45 KG/M2 | HEART RATE: 76 BPM | SYSTOLIC BLOOD PRESSURE: 120 MMHG | DIASTOLIC BLOOD PRESSURE: 72 MMHG | OXYGEN SATURATION: 98 % | RESPIRATION RATE: 24 BRPM | WEIGHT: 199.31 LBS

## 2023-03-06 DIAGNOSIS — J02.9 SORE THROAT: ICD-10-CM

## 2023-03-06 DIAGNOSIS — J06.9 URI WITH COUGH AND CONGESTION: Primary | ICD-10-CM

## 2023-03-06 LAB
GROUP A STREP BY PCR: NOT DETECTED
SARS-COV-2 RNA RESP QL NAA+PROBE: NEGATIVE

## 2023-03-06 PROCEDURE — 99213 OFFICE O/P EST LOW 20 MIN: CPT | Mod: CS | Performed by: NURSE PRACTITIONER

## 2023-03-06 PROCEDURE — 87651 STREP A DNA AMP PROBE: CPT | Mod: ZL | Performed by: NURSE PRACTITIONER

## 2023-03-06 PROCEDURE — U0003 INFECTIOUS AGENT DETECTION BY NUCLEIC ACID (DNA OR RNA); SEVERE ACUTE RESPIRATORY SYNDROME CORONAVIRUS 2 (SARS-COV-2) (CORONAVIRUS DISEASE [COVID-19]), AMPLIFIED PROBE TECHNIQUE, MAKING USE OF HIGH THROUGHPUT TECHNOLOGIES AS DESCRIBED BY CMS-2020-01-R: HCPCS | Mod: ZL | Performed by: NURSE PRACTITIONER

## 2023-03-06 PROCEDURE — G0463 HOSPITAL OUTPT CLINIC VISIT: HCPCS

## 2023-03-06 PROCEDURE — C9803 HOPD COVID-19 SPEC COLLECT: HCPCS | Performed by: NURSE PRACTITIONER

## 2023-03-06 ASSESSMENT — PAIN SCALES - GENERAL: PAINLEVEL: MODERATE PAIN (5)

## 2023-03-06 NOTE — PROGRESS NOTES
ASSESSMENT/PLAN:     I have reviewed the nursing notes.  I have reviewed the findings, diagnosis, plan and need for follow up with the patient.      1. Sore throat    - Group A Streptococcus PCR Throat Swab    Negative strep PCR test    2. URI with cough and congestion    - Symptomatic COVID-19 Virus (Coronavirus) by PCR Nose    Negative strep PCR test  Negative Covid PCR test    Discussed with patient that symptoms and exam are consistent with viral illness.    No clinical indications for antibiotic treatment at this time.      Symptomatic treatment - Encouraged fluids, salt water gargles, honey, elevation, humidifier, saline nasal spray, sinus rinse/netti pot, lozenges, tea, soup, smoothies, popsicles, topical vapor rub, rest, etc     May use over the counter cough and cold medication PRN  May use over-the-counter Tylenol or ibuprofen PRN    Discussed warning signs/symptoms indicative of need to f/u  Follow up if symptoms persist or worsen or concerns      I explained my diagnostic considerations and recommendations to the patient, who voiced understanding and agreement with the treatment plan. All questions were answered. We discussed potential side effects of any prescribed or recommended therapies, as well as expectations for response to treatments.    Lucia Fisher NP  [facility] AND [facility]      SUBJECTIVE:   Clotilde Dorantes is a 22 year old female who presents to clinic today for the following health issues:  Sore throat and cough    HPI  Started with cough 4 days ago.  Intermittent headaches.  Cough has been frequent and harsh.  Some chest tightness and heaviness with feeling winded.  Sore throat, nasal congestion, body aches and fatigue started today.  Ears feel plugged.   No fevers.  Chills and sweats last night.  Intermittent nausea after coughing fits.  No vomiting.   Appetite has been decreased.  Aleve yesterday for back pain with some relief of multiple symptoms.          Past Medical  History:   Diagnosis Date     Anemia due to blood loss, acute 9/17/2021     MTHFR gene mutation      Past Surgical History:   Procedure Laterality Date     wisdom teeth       Social History     Tobacco Use     Smoking status: Every Day     Packs/day: 0.25     Years: 2.00     Pack years: 0.50     Types: Cigarettes, Vaping Device     Start date: 03/2022     Smokeless tobacco: Never     Tobacco comments:     Not smoking tobacco, currently vaping   Substance Use Topics     Alcohol use: Not Currently     Comment: occ     No current outpatient medications on file.     Allergies   Allergen Reactions     Tramadol Other (See Comments)     Ankle swelling and pain  Other reaction(s): Joint Pain  Ankle issues         Past medical history, past surgical history, current medications and allergies reviewed and accurate to the best of my knowledge.        OBJECTIVE:     /72 (BP Location: Right arm, Patient Position: Sitting, Cuff Size: Adult Large)   Pulse 76   Temp 98.4  F (36.9  C)   Resp 24   Wt 90.4 kg (199 lb 5 oz)   SpO2 98%   BMI 36.45 kg/m    Body mass index is 36.45 kg/m .     Physical Exam  General Appearance: Miserable but nontoxic appearing adolescent female, appropriate appearance for age. No acute distress  Ears: Left TM intact, no erythema, no effusion, no bulging, no purulence.  Right TM intact, no erythema, no effusion, no bulging, no purulence.  Left auditory canal clear without drainage or bleeding.  Right auditory canal clear without drainage or bleeding.  Normal external ears, non tender.  Eyes: conjunctivae normal without erythema or irritation, corneas clear, no drainage or crusting, no eyelid swelling, pupils equal   Orophayrnx: moist mucous membranes, pharynx with minimal erythema, tonsils without hypertrophy, tonsils with minimal erythema, no tonsillar exudates, no oral lesions, no palate petechiae, no post nasal drip seen, no trismus, voice clear.    Nose:  Clear drainage and congestion    Neck: supple without adenopathy  Respiratory: normal chest wall and respirations.  Normal effort.  Clear to auscultation bilaterally, no wheezing, crackles or rhonchi.  No increased work of breathing.  Congested cough appreciated.  Cardiac: RRR with no murmurs  Musculoskeletal:  Equal movement of bilateral upper extremities.  Equal movement of bilateral lower extremities.  Normal gait.    Psychological: normal affect, alert, oriented, and pleasant.       Labs:  Results for orders placed or performed in visit on 03/06/23   Symptomatic COVID-19 Virus (Coronavirus) by PCR Nose     Status: Normal    Specimen: Nose; Swab   Result Value Ref Range    SARS CoV2 PCR Negative Negative    Narrative    Testing was performed using the Xpert Xpress SARS-CoV-2 Assay on the Cepheid Gene-Xpert Instrument Systems. Additional information about this Emergency Use Authorization (EUA) assay can be found via the Lab Guide. This test should be ordered for the detection of SARS-CoV-2 in individuals who meet SARS-CoV-2 clinical and/or epidemiological criteria as well as from individuals without symptoms or other reasons to suspect COVID-19. Test performance for asymptomatic patients has only been established in anterior nasal swab specimens. This test is for in vitro diagnostic use under the FDA EUA for laboratories certified under CLIA to perform high complexity testing. This test has not been FDA cleared or approved. A negative result does not rule out the presence of PCR inhibitors in the specimen or target RNA concentration below the limit of detection for the assay. The possibility of a false negative should be considered if the patient's recent exposure or clinical presentation suggests COVID-19. This test was validated by [facility]. This laboratory is certified under the Clinical Laboratory Improvement Amendments (CLIA) as qualified to perform high complexity clinical laboratory  testing.   Group A Streptococcus PCR Throat Swab     Status: Normal    Specimen: Throat; Swab   Result Value Ref Range    Group A strep by PCR Not Detected Not Detected    Narrative    The Xpert Xpress Strep A test, performed on the s[employer]cket Systems, is a rapid, qualitative in vitro diagnostic test for the detection of Streptococcus pyogenes (Group A ß-hemolytic Streptococcus, Strep A) in throat swab specimens from patients with signs and symptoms of pharyngitis. The Xpert Xpress Strep A test can be used as an aid in the diagnosis of Group A Streptococcal pharyngitis. The assay is not intended to monitor treatment for Group A Streptococcus infections. The Xpert Xpress Strep A test utilizes an automated real-time polymerase chain reaction (PCR) to detect Streptococcus pyogenes DNA.

## 2023-03-06 NOTE — LETTER
[facility]  [address]  [place] [address]  Phone: [phone]  Fax: [phone]    March 6, 2023        Clotilde Dorantes  [address]  [place] [address]          To whom it may concern:    RE: Clotilde Dorantes    Patient was seen and treated today at our clinic and missed work on 3/6/23 and 3/7/23 due to illness.  Strep and Covid testing pending.    Please contact me for questions or concerns.      Sincerely,        Lucia Fisher NP

## 2023-04-03 ENCOUNTER — OFFICE VISIT (OUTPATIENT)
Dept: FAMILY MEDICINE | Facility: OTHER | Age: 23
End: 2023-04-03
Attending: NURSE PRACTITIONER
Payer: COMMERCIAL

## 2023-04-03 VITALS
HEART RATE: 75 BPM | OXYGEN SATURATION: 97 % | DIASTOLIC BLOOD PRESSURE: 90 MMHG | BODY MASS INDEX: 35.3 KG/M2 | SYSTOLIC BLOOD PRESSURE: 122 MMHG | HEIGHT: 62 IN | WEIGHT: 191.8 LBS | RESPIRATION RATE: 16 BRPM | TEMPERATURE: 98.6 F

## 2023-04-03 DIAGNOSIS — K52.9 GASTROENTERITIS: Primary | ICD-10-CM

## 2023-04-03 LAB
ALBUMIN SERPL BCG-MCNC: 4.4 G/DL (ref 3.5–5.2)
ALP SERPL-CCNC: 53 U/L (ref 35–104)
ALT SERPL W P-5'-P-CCNC: 11 U/L (ref 10–35)
ANION GAP SERPL CALCULATED.3IONS-SCNC: 11 MMOL/L (ref 7–15)
AST SERPL W P-5'-P-CCNC: 13 U/L (ref 10–35)
BASOPHILS # BLD AUTO: 0.1 10E3/UL (ref 0–0.2)
BASOPHILS NFR BLD AUTO: 1 %
BILIRUB SERPL-MCNC: 0.6 MG/DL
BUN SERPL-MCNC: 9.5 MG/DL (ref 6–20)
CALCIUM SERPL-MCNC: 9.2 MG/DL (ref 8.6–10)
CHLORIDE SERPL-SCNC: 103 MMOL/L (ref 98–107)
CREAT SERPL-MCNC: 0.64 MG/DL (ref 0.51–0.95)
DEPRECATED HCO3 PLAS-SCNC: 24 MMOL/L (ref 22–29)
EOSINOPHIL # BLD AUTO: 0.1 10E3/UL (ref 0–0.7)
EOSINOPHIL NFR BLD AUTO: 0 %
ERYTHROCYTE [DISTWIDTH] IN BLOOD BY AUTOMATED COUNT: 15.7 % (ref 10–15)
GFR SERPL CREATININE-BSD FRML MDRD: >90 ML/MIN/1.73M2
GLUCOSE SERPL-MCNC: 100 MG/DL (ref 70–99)
HCT VFR BLD AUTO: 38.1 % (ref 35–47)
HGB BLD-MCNC: 12.8 G/DL (ref 11.7–15.7)
IMM GRANULOCYTES # BLD: 0.1 10E3/UL
IMM GRANULOCYTES NFR BLD: 0 %
LYMPHOCYTES # BLD AUTO: 1.4 10E3/UL (ref 0.8–5.3)
LYMPHOCYTES NFR BLD AUTO: 10 %
MCH RBC QN AUTO: 26.8 PG (ref 26.5–33)
MCHC RBC AUTO-ENTMCNC: 33.6 G/DL (ref 31.5–36.5)
MCV RBC AUTO: 80 FL (ref 78–100)
MONOCYTES # BLD AUTO: 0.5 10E3/UL (ref 0–1.3)
MONOCYTES NFR BLD AUTO: 4 %
NEUTROPHILS # BLD AUTO: 11.6 10E3/UL (ref 1.6–8.3)
NEUTROPHILS NFR BLD AUTO: 85 %
NRBC # BLD AUTO: 0 10E3/UL
NRBC BLD AUTO-RTO: 0 /100
PLATELET # BLD AUTO: 406 10E3/UL (ref 150–450)
POTASSIUM SERPL-SCNC: 3.6 MMOL/L (ref 3.4–5.3)
PROT SERPL-MCNC: 7.3 G/DL (ref 6.4–8.3)
RBC # BLD AUTO: 4.77 10E6/UL (ref 3.8–5.2)
SODIUM SERPL-SCNC: 138 MMOL/L (ref 136–145)
WBC # BLD AUTO: 13.7 10E3/UL (ref 4–11)

## 2023-04-03 PROCEDURE — 250N000009 HC RX 250: Performed by: NURSE PRACTITIONER

## 2023-04-03 PROCEDURE — 99213 OFFICE O/P EST LOW 20 MIN: CPT | Performed by: NURSE PRACTITIONER

## 2023-04-03 PROCEDURE — 36415 COLL VENOUS BLD VENIPUNCTURE: CPT | Mod: ZL | Performed by: NURSE PRACTITIONER

## 2023-04-03 PROCEDURE — G0463 HOSPITAL OUTPT CLINIC VISIT: HCPCS

## 2023-04-03 PROCEDURE — 250N000013 HC RX MED GY IP 250 OP 250 PS 637: Performed by: NURSE PRACTITIONER

## 2023-04-03 PROCEDURE — 85004 AUTOMATED DIFF WBC COUNT: CPT | Mod: ZL | Performed by: NURSE PRACTITIONER

## 2023-04-03 PROCEDURE — 80053 COMPREHEN METABOLIC PANEL: CPT | Mod: ZL | Performed by: NURSE PRACTITIONER

## 2023-04-03 RX ORDER — PANTOPRAZOLE SODIUM 40 MG/1
TABLET, DELAYED RELEASE ORAL
COMMUNITY
Start: 2023-03-30 | End: 2024-05-24

## 2023-04-03 RX ORDER — METOCLOPRAMIDE 10 MG/1
10 TABLET ORAL EVERY 6 HOURS PRN
COMMUNITY
Start: 2023-03-30 | End: 2024-05-24

## 2023-04-03 RX ADMIN — LIDOCAINE HYDROCHLORIDE 30 ML: 20 SOLUTION ORAL; TOPICAL at 10:39

## 2023-04-03 ASSESSMENT — PAIN SCALES - GENERAL: PAINLEVEL: MODERATE PAIN (5)

## 2023-04-03 NOTE — PATIENT INSTRUCTIONS
GI cocktail given in office today.  It is reassuring that your white count is trending downward as compared to when checked 6 days ago at the [facility].  There is no indication for any antibiotics at this time.  I would recommend establishing care with a primary care provider of your choice.  If you continue to have issues with epigastric discomfort and vomiting would recommend this to be discussed with PCP as well.  Most likely I feel you will continue to improve with time.  Continue the Protonix that you were given by the ER provider as well.  As needed anti nausea medicine.  Push fluids as able.

## 2023-04-03 NOTE — NURSING NOTE
"Pt presents to  for stomach problems. Pt was in [facility] on 3/28, and 3/29 for this.   Pt has been taking prescribed meds from ER - metoclopramide and pantoprazole. Pt has still been awake during some night vomiting - dark green and brown in color.    Chief Complaint   Patient presents with     Ulcer     Stomach ulcer       FOOD SECURITY SCREENING QUESTIONS  Hunger Vital Signs:  Within the past 12 months we worried whether our food would run out before we got money to buy more. Never  Within the past 12 months the food we bought just didn't last and we didn't have money to get more. Never  Maritza Dearmon 4/3/2023 10:04 AM      Initial BP (!) 122/90 (BP Location: Right arm, Patient Position: Sitting, Cuff Size: Adult Regular)   Pulse 75   Temp 98.6  F (37  C) (Tympanic)   Resp 16   Ht 1.575 m (5' 2\")   Wt 87 kg (191 lb 12.8 oz)   LMP 04/02/2023 (Exact Date)   SpO2 97%   BMI 35.08 kg/m   Estimated body mass index is 35.08 kg/m  as calculated from the following:    Height as of this encounter: 1.575 m (5' 2\").    Weight as of this encounter: 87 kg (191 lb 12.8 oz).  Medication Reconciliation: complete    Maritza Dealibby    "

## 2023-04-03 NOTE — PROGRESS NOTES
"ASSESSMENT/PLAN:    I have reviewed the nursing notes.  I have reviewed the findings, diagnosis, plan and need for follow up with the patient.    1. Gastroenteritis  - lidocaine (viscous) (XYLOCAINE) 2 % 15 mL, alum & mag hydroxide-simethicone (MAALOX) 15 mL GI Cocktail  - CBC and Differential  - Comprehensive Metabolic Panel  Repeat labs; show WBC trending downward today which is reassuring. Today WBC 13 as compared to 6 days ago WBC 16 in [place] ED. Suspect viral gastroenteritis continued, improving. GI cocktail today. Continue with protonix and reglan/zofran PRN. Continue to push fluids. CT scan was unremarkable per patient in ED in [place], scan documentation is not visible today. Patient ok to discharge home with continued plan. Recommend establish care with provider as she has moved to the area.     Discussed warning signs/symptoms indicative of need to f/u    Follow up if symptoms persist or worsen or concerns    I explained my diagnostic considerations and recommendations to the patient, who voiced understanding and agreement with the treatment plan. All questions were answered. We discussed potential side effects of any prescribed or recommended therapies, as well as expectations for response to treatments.    Park Bee NP  4/3/2023  10:09 AM    HPI:  Clotilde Dorantes is a 22 year old female who presents to [facility] today for concerns of a \"stomach ulcer.\" She tells me last week on Tuesday she started throwing up at work and continued to throw up into the evening. She was seen in the ED on 3/28/2023 and again 3/29/2023 in [place] and diagnosed with gastroenteritis. She received Zofran and Morphine there with some temporary improvement of symptoms. She has been taking protonix and reglan since then as recommended. She was feeling ok, but now she was up all night again last night vomiting. She did not go more than a day or so without vomiting in total (past 5 days).  She reports feeling " "\"extremely under the weather.\" She has needed to take the medicine every 6 hours for nausea otherwise she vomits, has actually needed to take it sooner than it was due. No known fevers. No diarrhea. She does report generalized ongoing abdominal discomfort. Mostly the pain is in the center of her abdomen, diaphram.     PCP is in [place]. Lives in [place], now and has not seen anyone for primary care in a few years.     Is menstruating currently. Negative pregnancy test last week as well in ED.     ROS otherwise negative.     Past Medical History:   Diagnosis Date     Anemia due to blood loss, acute 9/17/2021     MTHFR gene mutation      Past Surgical History:   Procedure Laterality Date     wisdom teeth       Social History     Tobacco Use     Smoking status: Every Day     Packs/day: 0.25     Years: 2.00     Pack years: 0.50     Types: Cigarettes, Vaping Device     Start date: 03/2022     Smokeless tobacco: Never     Tobacco comments:     Not smoking tobacco, currently vaping   Vaping Use     Vaping status: Every Day     Substances: Nicotine, Flavoring     Devices: Disposable   Substance Use Topics     Alcohol use: Not Currently     Comment: occ     Current Outpatient Medications   Medication Sig Dispense Refill     metoclopramide (REGLAN) 10 MG tablet Take 10 mg by mouth every 6 hours as needed       pantoprazole (PROTONIX) 40 MG EC tablet TAKE 1 TABLET BY MOUTH ONCE DAILY FOR 14 DAYS . DO NOT CRUSH       Allergies   Allergen Reactions     Tramadol Other (See Comments)     Ankle swelling and pain  Other reaction(s): Joint Pain  Ankle issues     Past medical history, past surgical history, current medications and allergies reviewed and accurate to the best of my knowledge.      ROS:  Refer to HPI    BP (!) 122/90 (BP Location: Right arm, Patient Position: Sitting, Cuff Size: Adult Regular)   Pulse 75   Temp 98.6  F (37  C) (Tympanic)   Resp 16   Ht 1.575 m (5' 2\")   Wt 87 kg (191 lb 12.8 oz)   LMP 04/02/2023 " (Exact Date)   SpO2 97%   BMI 35.08 kg/m      EXAM:  General Appearance: Well appearing 22 year old female, appropriate appearance for age. No acute distress   Respiratory: normal chest wall and respirations.  Normal effort.  Clear to auscultation bilaterally, no wheezing, crackles or rhonchi.  No increased work of breathing.  No cough appreciated.  Cardiac: RRR with no murmurs  Abdomen: soft, + generalized and epigastric tenderness, no rigidity, no rebound tenderness or guarding, normal bowel sounds present  :  No suprapubic tenderness to palpation.  Absent CVA tenderness to palpation.    Musculoskeletal:  Equal movement of bilateral upper extremities.  Equal movement of bilateral lower extremities.  Normal gait.    Dermatological: no rashes noted of exposed skin  Psychological: normal affect, alert, oriented, and pleasant.     Results for orders placed or performed in visit on 04/03/23   Comprehensive Metabolic Panel     Status: Abnormal   Result Value Ref Range    Sodium 138 136 - 145 mmol/L    Potassium 3.6 3.4 - 5.3 mmol/L    Chloride 103 98 - 107 mmol/L    Carbon Dioxide (CO2) 24 22 - 29 mmol/L    Anion Gap 11 7 - 15 mmol/L    Urea Nitrogen 9.5 6.0 - 20.0 mg/dL    Creatinine 0.64 0.51 - 0.95 mg/dL    Calcium 9.2 8.6 - 10.0 mg/dL    Glucose 100 (H) 70 - 99 mg/dL    Alkaline Phosphatase 53 35 - 104 U/L    AST 13 10 - 35 U/L    ALT 11 10 - 35 U/L    Protein Total 7.3 6.4 - 8.3 g/dL    Albumin 4.4 3.5 - 5.2 g/dL    Bilirubin Total 0.6 <=1.2 mg/dL    GFR Estimate >90 >60 mL/min/1.73m2   CBC with platelets and differential     Status: Abnormal   Result Value Ref Range    WBC Count 13.7 (H) 4.0 - 11.0 10e3/uL    RBC Count 4.77 3.80 - 5.20 10e6/uL    Hemoglobin 12.8 11.7 - 15.7 g/dL    Hematocrit 38.1 35.0 - 47.0 %    MCV 80 78 - 100 fL    MCH 26.8 26.5 - 33.0 pg    MCHC 33.6 31.5 - 36.5 g/dL    RDW 15.7 (H) 10.0 - 15.0 %    Platelet Count 406 150 - 450 10e3/uL    % Neutrophils 85 %    % Lymphocytes 10 %    %  Monocytes 4 %    % Eosinophils 0 %    % Basophils 1 %    % Immature Granulocytes 0 %    NRBCs per 100 WBC 0 <1 /100    Absolute Neutrophils 11.6 (H) 1.6 - 8.3 10e3/uL    Absolute Lymphocytes 1.4 0.8 - 5.3 10e3/uL    Absolute Monocytes 0.5 0.0 - 1.3 10e3/uL    Absolute Eosinophils 0.1 0.0 - 0.7 10e3/uL    Absolute Basophils 0.1 0.0 - 0.2 10e3/uL    Absolute Immature Granulocytes 0.1 <=0.4 10e3/uL    Absolute NRBCs 0.0 10e3/uL   CBC and Differential     Status: Abnormal    Narrative    The following orders were created for panel order CBC and Differential.  Procedure                               Abnormality         Status                     ---------                               -----------         ------                     CBC with platelets and d...[[id number]]  Abnormal            Final result                 Please view results for these tests on the individual orders.

## 2023-04-03 NOTE — LETTER
April 3, 2023      Clotilde Dorantes  [address]  [place] [address]        To Whom It May Concern:    Clotilde Dorantes was seen in our clinic. She may return to work on 4/4/2023 without restrictions if she is improving. Please also excuse for 3/30/2023 due to missing work for current illness.       Sincerely,        Park Bee NP

## 2023-04-10 ENCOUNTER — OFFICE VISIT (OUTPATIENT)
Dept: FAMILY MEDICINE | Facility: OTHER | Age: 23
End: 2023-04-10
Attending: NURSE PRACTITIONER
Payer: COMMERCIAL

## 2023-04-10 VITALS
TEMPERATURE: 98 F | WEIGHT: 191.4 LBS | SYSTOLIC BLOOD PRESSURE: 110 MMHG | BODY MASS INDEX: 35.01 KG/M2 | OXYGEN SATURATION: 96 % | DIASTOLIC BLOOD PRESSURE: 60 MMHG | HEART RATE: 98 BPM | RESPIRATION RATE: 16 BRPM

## 2023-04-10 DIAGNOSIS — J02.9 ACUTE PHARYNGITIS, UNSPECIFIED ETIOLOGY: Primary | ICD-10-CM

## 2023-04-10 DIAGNOSIS — L02.415 CUTANEOUS ABSCESS OF RIGHT LOWER EXTREMITY: ICD-10-CM

## 2023-04-10 DIAGNOSIS — L08.9 SOFT TISSUE INFECTION: ICD-10-CM

## 2023-04-10 LAB — GROUP A STREP BY PCR: NOT DETECTED

## 2023-04-10 PROCEDURE — G0463 HOSPITAL OUTPT CLINIC VISIT: HCPCS

## 2023-04-10 PROCEDURE — 87651 STREP A DNA AMP PROBE: CPT | Mod: ZL | Performed by: NURSE PRACTITIONER

## 2023-04-10 PROCEDURE — 99213 OFFICE O/P EST LOW 20 MIN: CPT | Performed by: NURSE PRACTITIONER

## 2023-04-10 RX ORDER — SULFAMETHOXAZOLE/TRIMETHOPRIM 800-160 MG
1 TABLET ORAL 2 TIMES DAILY
Qty: 14 TABLET | Refills: 0 | Status: SHIPPED | OUTPATIENT
Start: 2023-04-10 | End: 2023-04-17

## 2023-04-10 ASSESSMENT — PAIN SCALES - GENERAL: PAINLEVEL: SEVERE PAIN (6)

## 2023-04-10 NOTE — LETTER
April 10, 2023      Clotilde Dorantes  [address]  [place] [address]        To Whom It May Concern:    Clotilde Dorantes was seen in our clinic. She may return to work without restrictions on 4/10/2023 but if she is POSITIVE for strep; may return without restrictions on 4/11/2023.      Sincerely,        Park Bee NP

## 2023-04-10 NOTE — PROGRESS NOTES
"ASSESSMENT/PLAN:    I have reviewed the nursing notes.  I have reviewed the findings, diagnosis, plan and need for follow up with the patient.    1. Acute pharyngitis, unspecified etiology  - Group A Streptococcus PCR Throat Swab  Symptomatic treatment - Encouraged fluids, salt water gargles, honey (only if greater than 1 year in age due to risk of botulism), elevation, humidifier, sinus rinse/netti pot, lozenges, tea, topical vapor rub, popsicles, rest, etc   May use over-the-counter Tylenol or ibuprofen PRN  Negative strep pharyngitis swab, suspect viral etiology as discussed with patient in the office.  Majority of sore throats are caused by viral illness and do not require antibiotic.    2. Cutaneous abscess of right lower extremity  3. Soft tissue infection  Opted to prescribe Bactrim for small soft tissue abscess with on the right posterior leg to cover for possible MRSA infection.  Would recommend warm compresses.  If no resolution or abscess becomes larger would recommend that she return for potential incision and drainage.  She verbalized understanding of this.  - sulfamethoxazole-trimethoprim (BACTRIM DS) 800-160 MG tablet; Take 1 tablet by mouth 2 times daily for 7 days  Dispense: 14 tablet; Refill: 0    Discussed warning signs/symptoms indicative of need to f/u    Follow up if symptoms persist or worsen or concerns    I explained my diagnostic considerations and recommendations to the patient, who voiced understanding and agreement with the treatment plan. All questions were answered. We discussed potential side effects of any prescribed or recommended therapies, as well as expectations for response to treatments.    Park Bee NP  4/10/2023  9:51 AM    HPI:  Clotilde Dorantes is a 22 year old female  who presents to [facility] today for concerns of sore throat that started last night, bit of congestion. She thought maybe a fever last night. \"feels like strep.\" no cough. No other symptoms " currently.     She also tells me that a few days ago she noticed a lump behind her right thigh region without drainage.  It is tender to the touch and red, slightly warm.  No fevers.  She does not feel ill.  She does not have any other similar rash or lesions anywhere else on her body.  No one else at home has a similar skin infection.  Has not had this before.  She wonders if it is spider bite.    Past Medical History:   Diagnosis Date     Anemia due to blood loss, acute 9/17/2021     MTHFR gene mutation      Past Surgical History:   Procedure Laterality Date     wisdom teeth       Social History     Tobacco Use     Smoking status: Every Day     Types: Vaping Device     Start date: 03/2022     Smokeless tobacco: Never     Tobacco comments:     Not smoking tobacco, currently vaping   Vaping Use     Vaping status: Every Day     Substances: Nicotine, Flavoring     Devices: Disposable   Substance Use Topics     Alcohol use: Not Currently     Comment: occ     Current Outpatient Medications   Medication Sig Dispense Refill     metoclopramide (REGLAN) 10 MG tablet Take 10 mg by mouth every 6 hours as needed       pantoprazole (PROTONIX) 40 MG EC tablet TAKE 1 TABLET BY MOUTH ONCE DAILY FOR 14 DAYS . DO NOT CRUSH       Allergies   Allergen Reactions     Tramadol Other (See Comments)     Ankle swelling and pain  Other reaction(s): Joint Pain  Ankle issues     Past medical history, past surgical history, current medications and allergies reviewed and accurate to the best of my knowledge.      ROS:  Refer to HPI    /60 (BP Location: Left arm, Patient Position: Sitting, Cuff Size: Adult Large)   Pulse 98   Temp 98  F (36.7  C) (Temporal)   Resp 16   Wt 86.8 kg (191 lb 6.4 oz)   LMP 04/02/2023 (Exact Date)   SpO2 96%   Breastfeeding No   BMI 35.01 kg/m      EXAM:  General Appearance: Well appearing 22 year old female, appropriate appearance for age. No acute distress   Ears: Left TM intact, translucent with bony  landmarks appreciated, no erythema, no effusion, no bulging, no purulence.  Right TM intact, translucent with bony landmarks appreciated, no erythema, no effusion, no bulging, no purulence.  Left auditory canal clear.  Right auditory canal clear.  Normal external ears, non tender.  Eyes: conjunctivae normal without erythema or irritation, corneas clear, no drainage or crusting, no eyelid swelling, pupils equal   Oropharynx: moist mucous membranes,+ posterior pharynx with erythema, tonsils symmetric, + erythema, no exudates or petechiae, no post nasal drip seen, no trismus, voice clear.     Sinuses:  No sinus tenderness upon palpation of the frontal or maxillary sinuses  Nose:  Bilateral nares: no erythema, no edema, + nasal congestion   Neck: supple without adenopathy  Respiratory: normal chest wall and respirations.  Normal effort.  Clear to auscultation bilaterally, no wheezing, crackles or rhonchi.  No increased work of breathing.  No cough appreciated.  Cardiac: RRR with no murmurs  Musculoskeletal:  Equal movement of bilateral upper extremities.  Equal movement of bilateral lower extremities.  Normal gait.    Dermatological: + right posterior thigh, just above the flexor surface of knee: <2 cm diameter erythematous, slightly raised but firm and tender abscess without fluctuance or drainage. Slight surrounding erythema and warmth surrounding the site.   Psychological: normal affect, alert, oriented, and pleasant.     Results for orders placed or performed in visit on 04/10/23   Group A Streptococcus PCR Throat Swab     Status: Normal    Specimen: Throat; Swab   Result Value Ref Range    Group A strep by PCR Not Detected Not Detected    Narrative    The Xpert Xpress Strep A test, performed on the [employer], is a rapid, qualitative in vitro diagnostic test for the detection of Streptococcus pyogenes (Group A ß-hemolytic Streptococcus, Strep A) in throat swab specimens from patients with signs  and symptoms of pharyngitis. The Xpert Xpress Strep A test can be used as an aid in the diagnosis of Group A Streptococcal pharyngitis. The assay is not intended to monitor treatment for Group A Streptococcus infections. The Xpert Xpress Strep A test utilizes an automated real-time polymerase chain reaction (PCR) to detect Streptococcus pyogenes DNA.

## 2023-04-10 NOTE — NURSING NOTE
"Chief Complaint   Patient presents with     Throat Problem     Sore throat  started last night  congestion         Medication reconciliation completed.    FOOD SECURITY SCREENING QUESTIONS:    The next two questions are to help us understand your food security.  If you are feeling you need any assistance in this area, we have resources available to support you today.    Hunger Vital Signs:  Within the past 12 months we worried whether our food would run out before we got money to buy more. Never  Within the past 12 months the food we bought just didn't last and we didn't have money to get more. Never    Initial /60 (BP Location: Left arm, Patient Position: Sitting, Cuff Size: Adult Large)   Pulse 98   Temp 98  F (36.7  C) (Temporal)   Resp 16   Wt 86.8 kg (191 lb 6.4 oz)   LMP 04/02/2023 (Exact Date)   SpO2 96%   Breastfeeding No   BMI 35.01 kg/m   Estimated body mass index is 35.01 kg/m  as calculated from the following:    Height as of 4/3/23: 1.575 m (5' 2\").    Weight as of this encounter: 86.8 kg (191 lb 6.4 oz).       Shantell Dave LPN .......  4/10/2023  9:52 AM  "

## 2023-09-21 NOTE — TELEPHONE ENCOUNTER
Returned patients call and patient states that she is feeling baby but not like she used to as much. Discussed her doing a kick count and to stay well hydrated and have something to eat and lay down and monitor baby's movements.  Patients states she is not having contractions, bleeding, pain or leaking of fluid. Advised patient to call back if unable to pass kick count or with any other concerns. She voiced understanding and has appointment on 7/22/21 with Dr. Gutierrez.   Fadumo Cardenas RN on 7/20/2021 at 12:18 PM     Tetracycline Counseling: Patient counseled regarding possible photosensitivity and increased risk for sunburn.  Patient instructed to avoid sunlight, if possible.  When exposed to sunlight, patients should wear protective clothing, sunglasses, and sunscreen.  The patient was instructed to call the office immediately if the following severe adverse effects occur:  hearing changes, easy bruising/bleeding, severe headache, or vision changes.  The patient verbalized understanding of the proper use and possible adverse effects of tetracycline.  All of the patient's questions and concerns were addressed. Patient understands to avoid pregnancy while on therapy due to potential birth defects.

## 2024-04-17 ENCOUNTER — MYC MEDICAL ADVICE (OUTPATIENT)
Dept: FAMILY MEDICINE | Facility: OTHER | Age: 24
End: 2024-04-17
Payer: COMMERCIAL

## 2024-04-17 NOTE — TELEPHONE ENCOUNTER
12/9/2022 LOV with Jessica Zelaya.    My thoughts:  Ok to test again in a few days at home.  Or come in for a blood test- [facility] or Clinic?    Heather Hein RN on 4/17/2024 at 1:29 PM

## 2024-05-08 ENCOUNTER — MYC MEDICAL ADVICE (OUTPATIENT)
Dept: FAMILY MEDICINE | Facility: OTHER | Age: 24
End: 2024-05-08
Payer: COMMERCIAL

## 2024-05-24 ENCOUNTER — OFFICE VISIT (OUTPATIENT)
Dept: FAMILY MEDICINE | Facility: OTHER | Age: 24
End: 2024-05-24
Attending: PHYSICIAN ASSISTANT
Payer: COMMERCIAL

## 2024-05-24 VITALS
BODY MASS INDEX: 37.09 KG/M2 | SYSTOLIC BLOOD PRESSURE: 118 MMHG | DIASTOLIC BLOOD PRESSURE: 94 MMHG | HEART RATE: 83 BPM | OXYGEN SATURATION: 97 % | TEMPERATURE: 98.1 F | WEIGHT: 202.8 LBS

## 2024-05-24 DIAGNOSIS — R19.7 DIARRHEA, UNSPECIFIED TYPE: ICD-10-CM

## 2024-05-24 DIAGNOSIS — R10.13 EPIGASTRIC PAIN: Primary | ICD-10-CM

## 2024-05-24 DIAGNOSIS — L98.9 SKIN LESION: ICD-10-CM

## 2024-05-24 DIAGNOSIS — M25.562 ACUTE PAIN OF LEFT KNEE: ICD-10-CM

## 2024-05-24 DIAGNOSIS — K21.9 GASTROESOPHAGEAL REFLUX DISEASE, UNSPECIFIED WHETHER ESOPHAGITIS PRESENT: ICD-10-CM

## 2024-05-24 LAB
ALBUMIN SERPL BCG-MCNC: 4.5 G/DL (ref 3.5–5.2)
ALP SERPL-CCNC: 52 U/L (ref 40–150)
ALT SERPL W P-5'-P-CCNC: 12 U/L (ref 0–50)
ANION GAP SERPL CALCULATED.3IONS-SCNC: 12 MMOL/L (ref 7–15)
AST SERPL W P-5'-P-CCNC: 15 U/L (ref 0–45)
BASOPHILS # BLD AUTO: 0.1 10E3/UL (ref 0–0.2)
BASOPHILS NFR BLD AUTO: 1 %
BILIRUB SERPL-MCNC: 0.6 MG/DL
BUN SERPL-MCNC: 11.2 MG/DL (ref 6–20)
CALCIUM SERPL-MCNC: 9.5 MG/DL (ref 8.6–10)
CHLORIDE SERPL-SCNC: 105 MMOL/L (ref 98–107)
CREAT SERPL-MCNC: 0.69 MG/DL (ref 0.51–0.95)
DEPRECATED HCO3 PLAS-SCNC: 22 MMOL/L (ref 22–29)
EGFRCR SERPLBLD CKD-EPI 2021: >90 ML/MIN/1.73M2
EOSINOPHIL # BLD AUTO: 0.2 10E3/UL (ref 0–0.7)
EOSINOPHIL NFR BLD AUTO: 3 %
ERYTHROCYTE [DISTWIDTH] IN BLOOD BY AUTOMATED COUNT: 14.6 % (ref 10–15)
GLUCOSE SERPL-MCNC: 89 MG/DL (ref 70–99)
HCT VFR BLD AUTO: 39.7 % (ref 35–47)
HGB BLD-MCNC: 13 G/DL (ref 11.7–15.7)
IMM GRANULOCYTES # BLD: 0 10E3/UL
IMM GRANULOCYTES NFR BLD: 0 %
LIPASE SERPL-CCNC: 17 U/L (ref 13–60)
LYMPHOCYTES # BLD AUTO: 1.8 10E3/UL (ref 0.8–5.3)
LYMPHOCYTES NFR BLD AUTO: 22 %
MCH RBC QN AUTO: 28.8 PG (ref 26.5–33)
MCHC RBC AUTO-ENTMCNC: 32.7 G/DL (ref 31.5–36.5)
MCV RBC AUTO: 88 FL (ref 78–100)
MONOCYTES # BLD AUTO: 0.4 10E3/UL (ref 0–1.3)
MONOCYTES NFR BLD AUTO: 5 %
NEUTROPHILS # BLD AUTO: 5.7 10E3/UL (ref 1.6–8.3)
NEUTROPHILS NFR BLD AUTO: 69 %
NRBC # BLD AUTO: 0 10E3/UL
NRBC BLD AUTO-RTO: 0 /100
PLATELET # BLD AUTO: 344 10E3/UL (ref 150–450)
POTASSIUM SERPL-SCNC: 4 MMOL/L (ref 3.4–5.3)
PROT SERPL-MCNC: 7.3 G/DL (ref 6.4–8.3)
RBC # BLD AUTO: 4.52 10E6/UL (ref 3.8–5.2)
SODIUM SERPL-SCNC: 139 MMOL/L (ref 135–145)
WBC # BLD AUTO: 8.2 10E3/UL (ref 4–11)

## 2024-05-24 PROCEDURE — 85025 COMPLETE CBC W/AUTO DIFF WBC: CPT | Mod: ZL | Performed by: PHYSICIAN ASSISTANT

## 2024-05-24 PROCEDURE — 36415 COLL VENOUS BLD VENIPUNCTURE: CPT | Mod: ZL | Performed by: PHYSICIAN ASSISTANT

## 2024-05-24 PROCEDURE — 99214 OFFICE O/P EST MOD 30 MIN: CPT | Performed by: PHYSICIAN ASSISTANT

## 2024-05-24 PROCEDURE — 83690 ASSAY OF LIPASE: CPT | Mod: ZL | Performed by: PHYSICIAN ASSISTANT

## 2024-05-24 PROCEDURE — G0463 HOSPITAL OUTPT CLINIC VISIT: HCPCS

## 2024-05-24 PROCEDURE — 80053 COMPREHEN METABOLIC PANEL: CPT | Mod: ZL | Performed by: PHYSICIAN ASSISTANT

## 2024-05-24 ASSESSMENT — ANXIETY QUESTIONNAIRES
IF YOU CHECKED OFF ANY PROBLEMS ON THIS QUESTIONNAIRE, HOW DIFFICULT HAVE THESE PROBLEMS MADE IT FOR YOU TO DO YOUR WORK, TAKE CARE OF THINGS AT HOME, OR GET ALONG WITH OTHER PEOPLE: VERY DIFFICULT
2. NOT BEING ABLE TO STOP OR CONTROL WORRYING: NEARLY EVERY DAY
4. TROUBLE RELAXING: MORE THAN HALF THE DAYS
7. FEELING AFRAID AS IF SOMETHING AWFUL MIGHT HAPPEN: MORE THAN HALF THE DAYS
1. FEELING NERVOUS, ANXIOUS, OR ON EDGE: NEARLY EVERY DAY
6. BECOMING EASILY ANNOYED OR IRRITABLE: NEARLY EVERY DAY
5. BEING SO RESTLESS THAT IT IS HARD TO SIT STILL: MORE THAN HALF THE DAYS
GAD7 TOTAL SCORE: 18
3. WORRYING TOO MUCH ABOUT DIFFERENT THINGS: NEARLY EVERY DAY
7. FEELING AFRAID AS IF SOMETHING AWFUL MIGHT HAPPEN: MORE THAN HALF THE DAYS
GAD7 TOTAL SCORE: 18
8. IF YOU CHECKED OFF ANY PROBLEMS, HOW DIFFICULT HAVE THESE MADE IT FOR YOU TO DO YOUR WORK, TAKE CARE OF THINGS AT HOME, OR GET ALONG WITH OTHER PEOPLE?: VERY DIFFICULT

## 2024-05-24 ASSESSMENT — PATIENT HEALTH QUESTIONNAIRE - PHQ9
SUM OF ALL RESPONSES TO PHQ QUESTIONS 1-9: 20
10. IF YOU CHECKED OFF ANY PROBLEMS, HOW DIFFICULT HAVE THESE PROBLEMS MADE IT FOR YOU TO DO YOUR WORK, TAKE CARE OF THINGS AT HOME, OR GET ALONG WITH OTHER PEOPLE: VERY DIFFICULT
SUM OF ALL RESPONSES TO PHQ QUESTIONS 1-9: 20

## 2024-05-24 ASSESSMENT — ENCOUNTER SYMPTOMS: NERVOUS/ANXIOUS: 1

## 2024-05-24 NOTE — NURSING NOTE
"Chief Complaint   Patient presents with    Abdominal Pain    Anxiety     Patient wakes up with abdominal pain and stomach upset. Does have diarrhea sometimes along with it. She was told it may be anxiety, which she used to be on medications for when she was younger. She has mole on right knee that she wants looked at. Positive history of skin cancer. Left knee pain as well.  Initial BP (!) 152/96   Pulse 83   Temp 98.1  F (36.7  C) (Tympanic)   Wt 92 kg (202 lb 12.8 oz)   LMP 05/22/2024 (Exact Date)   SpO2 97%   BMI 37.09 kg/m   Estimated body mass index is 37.09 kg/m  as calculated from the following:    Height as of 4/3/23: 1.575 m (5' 2\").    Weight as of this encounter: 92 kg (202 lb 12.8 oz).  Medication Review: complete    The next two questions are to help us understand your food security.  If you are feeling you need any assistance in this area, we have resources available to support you today.          5/24/2024   SDOH- Food Insecurity   Within the past 12 months, did you worry that your food would run out before you got money to buy more? N   Within the past 12 months, did the food you bought just not last and you didn t have money to get more? N         Health Care Directive:  Patient does not have a Health Care Directive or Living Will: Discussed advance care planning with patient; however, patient declined at this time.    Ermelinda Antoine MA      "

## 2024-05-24 NOTE — PROGRESS NOTES
Assessment & Plan   Problem List Items Addressed This Visit    None  Visit Diagnoses       Epigastric pain    -  Primary    Relevant Medications    omeprazole (PRILOSEC) 20 MG DR capsule    Other Relevant Orders    CBC and Differential (Completed)    Comprehensive Metabolic Panel (Completed)    Lipase (Completed)    Enteric Bacteria and Virus Panel PCR    Helicobacter pylori Antigen Stool    C. difficile Toxin B PCR with reflex to C. difficile Antigen and Toxins A/B EIA (Completed)    Gastroesophageal reflux disease, unspecified whether esophagitis present        Relevant Medications    omeprazole (PRILOSEC) 20 MG DR capsule    Other Relevant Orders    CBC and Differential (Completed)    Comprehensive Metabolic Panel (Completed)    Lipase (Completed)    Enteric Bacteria and Virus Panel PCR    Helicobacter pylori Antigen Stool    C. difficile Toxin B PCR with reflex to C. difficile Antigen and Toxins A/B EIA (Completed)    Diarrhea, unspecified type        Relevant Medications    omeprazole (PRILOSEC) 20 MG DR capsule    Other Relevant Orders    CBC and Differential (Completed)    Comprehensive Metabolic Panel (Completed)    Lipase (Completed)    Enteric Bacteria and Virus Panel PCR    Helicobacter pylori Antigen Stool    C. difficile Toxin B PCR with reflex to C. difficile Antigen and Toxins A/B EIA (Completed)    Skin lesion        Relevant Orders    Adult Dermatology  Referral    Acute pain of left knee        Relevant Orders    XR Knee Standing 2v  Bilateral & 2v Left (Completed)    Physical Therapy  Referral    Cervical cancer screening               Left knee pain:   Completed left knee xray.  I personally reviewed the xray. I found no fracture appreciated upon initial read of xray.  Final read pending by radiology.  Encouraged to take ibuprofen for relief up to 4 times per day.  Encouraged rest and elevation.  Encouraged to use ice or heat 15 minutes at a time several times per day to  Spiritual Plan of Care    Encountered patient and family/friends in response to a referral from [facility],suggesting that family could benefit from grief support. Family expressed gratitude for  Intern's guided meditation yesterday.  normalized grief feelings/tearfulness, and provided a YouTube video of ocean sounds to build upon yesterday's guided meditation. Staff  defers further spiritual care to the hospice service, unless otherwise requested.    Pt Name: Denys Nix  Pt : 1960  Date: 2021    Visit Type: In person    Referral Source: Interdisciplinary team    Reason for Visit: Routine Visit    Visited With: Family/Friend    Length of Visit: 20 minutes    Requires Follow-up: No    Follow-up Date:      Follow-up Reason:       Spiritual Care Consult Needed: Spiritual Care eval completed    Spiritual Care Visit Preference: None    Taxonomy:    · Intended Effects: Meaning-Making  · Methods: Offer support  · Interventions: Provide Jain music      Patient Affect at Time of Visit: Unresponsive  Patient Assessment: Unable to assess  Patient  Intervention:        Family/Friend Name: Family and friends gathered in room  Family/Friend Affect at Time of Visit: Tearful, Sad  Family/Friend Assessment: Grief   Family/Friend  Intervention: Grief Support,  Support, Music    Other Assessment:    Other Affect at Time of Visit:    Other Assessment:    Other  Intervention:      Spiritual Plan of Care: Follow up if requested    Patient Reported Outcome:       [facility]:    [facility] Phone Number:       decrease pain. Return to clinic in 1-2 weeks as necessary for persistent pain. Return to clinic with any change or worsening of symptoms.   Referred to physical therapy for consult.  Can call for an orthopedic referral if needed.    Abdominal pain: Completed thorough lab workup to rule out concerns.  Ordered stool testing to be completed.  Completed CBC, CMP, and lipase which are stable.  Stool testing needs to be returned.  Gave warning signs and symptoms.  Patient was started omeprazole 20 mg daily.  Encouraged to decrease irritation of fluids along with ibuprofen, aspirin, and Aleve.  Use Tums as needed.  Encouraged close follow-up if symptoms or not improving or worsening.    Try small amounts of food and drink frequently. Offer a bland diet. Advance as tolerated. Avoid dairy products the first day. You may add yogurt tomorrow as the first dairy product.    Try the BRAT diet (bread, bananas, rice, applesauce, tea, toast).  This is a very bland diet which should not irritate your colon.  Hold off on spicy foods, red sauces, mexican or chinese food.    Call or return to clinic as needed if your symptoms worsen or fail to improve as anticipated.     If the pain does not begin improving, localizes to the right lower belly, there is increased fever, or other progression of symptoms, return for reassessment.    Should I see a doctor or nurse about my stomach ache? -- Most people do not need to see a doctor or nurse for a stomach ache. But you should see your doctor or nurse if:  ?You have bloody bowel movements, diarrhea, or vomiting  ?Your pain is severe and lasts more than an hour or comes and goes for more than 24 hours  ?You cannot eat or drink for hours  ?You have a fever higher than 102 F (39 C)  ?You lose a lot of weight without trying to, or lose interest in food        Skin lesion: Refer to dermatology for full body skin check.    Due for physical with Pap test.    History of depression.  Currently stable.   No acute concerns at this time.     Nicotine/Tobacco Cessation  She reports that she has been smoking vaping device. She started smoking about 2 years ago. She has never used smokeless tobacco.  Nicotine/Tobacco Cessation Plan  Information offered: Patient not interested at this time      Depression Screening Follow Up        5/24/2024    10:24 AM   PHQ   PHQ-9 Total Score 20   Q9: Thoughts of better off dead/self-harm past 2 weeks Not at all         5/24/2024    10:24 AM   Last PHQ-9   1.  Little interest or pleasure in doing things 2   2.  Feeling down, depressed, or hopeless 2   3.  Trouble falling or staying asleep, or sleeping too much 3   4.  Feeling tired or having little energy 3   5.  Poor appetite or overeating 3   6.  Feeling bad about yourself 2   7.  Trouble concentrating 3   8.  Moving slowly or restless 2   Q9: Thoughts of better off dead/self-harm past 2 weeks 0   PHQ-9 Total Score 20         Follow Up Actions Taken  Patient counseled, no additional follow up at this time.     See Patient Instructions    Return if symptoms worsen or fail to improve.      Saravanan Mabry is a 23 year old, presenting for the following health issues:  Abdominal Pain and Anxiety        5/24/2024    10:53 AM   Additional Questions   Roomed by Ermelinda BENITEZ CMA     History of Present Illness       Mental Health Follow-up:  Patient presents to follow-up on Depression & Anxiety.Patient's depression since last visit has been:  Bad  The patient is not having other symptoms associated with depression.  Patient's anxiety since last visit has been:  Bad  The patient is not having other symptoms associated with anxiety.  Any significant life events: grief or loss  Patient is feeling anxious or having panic attacks.  Patient has no concerns about alcohol or drug use.    She eats 0-1 servings of fruits and vegetables daily.She consumes 1 sweetened beverage(s) daily.She exercises with enough effort to increase her heart rate 9 or less  "minutes per day.  She exercises with enough effort to increase her heart rate 3 or less days per week.   She is taking medications regularly.     Concern - abdominal pain  Onset: 6 mos  Description: rotten stomach, sometimes diarrhea  Intensity: moderate  Progression of Symptoms:  worsening  Accompanying Signs & Symptoms: pain  Previous history of similar problem: no  Precipitating factors:        Worsened by: alcohol, caffeine, spicy food  Alleviating factors:        Improved by: lay in bed for a few minutes, but is always kind of there.  Therapies tried and outcome: None    Over the last 6 months patient has been having stomach symptoms.  States that her stomach feels \"rotten\".  Stop drinking alcohol and caffeine.  Caffeine and spicy food irritates her stomach.  Having mid to upper belly pain.  Laying down helps.  Nothing takes it fully away.  Has a constant dull irritation.  Has diarrhea and soft stools.  Diarrhea typically every morning with a few episodes.  No blood in the stool or black tarry stools.  No dysuria, frequency, urgency, hematuria.  No fevers or chills.  No cough or cold symptoms.  Occasional heartburn however it is infrequent.  Tends to flare more with spicy and greasy foods.  Stomach pain can get very painful at times.  Will typically get pain within 5 minutes of eating irritating foods.  Energy drinks make her stomach hurt.  Mother has history of ulcers.    Patient has been having some left-sided knee pain.  Knee tends to click and pop.  Has been present over the last few months.  No injury or trauma.  No bruising or swelling.    Skin lesion on her knee along with several moles on her back.  Would like a full-body skin check.    Review of Systems  Constitutional, neuro, ENT, endocrine, pulmonary, cardiac, gastrointestinal, genitourinary, musculoskeletal, integument and psychiatric systems are negative, except as otherwise noted.      Objective    BP (!) 118/94 (BP Location: Right arm, Patient " Position: Sitting, Cuff Size: Adult Large)   Pulse 83   Temp 98.1  F (36.7  C) (Tympanic)   Wt 92 kg (202 lb 12.8 oz)   LMP 05/22/2024 (Exact Date)   SpO2 97%   BMI 37.09 kg/m    Body mass index is 37.09 kg/m .  Physical Exam  Vitals and nursing note reviewed.   Constitutional:       General: She is not in acute distress.     Appearance: Normal appearance. She is well-developed.   HENT:      Head: Normocephalic and atraumatic.   Cardiovascular:      Rate and Rhythm: Normal rate and regular rhythm.      Heart sounds: Normal heart sounds, S1 normal and S2 normal. No murmur heard.  Pulmonary:      Effort: Pulmonary effort is normal. No respiratory distress.      Breath sounds: Normal breath sounds. No wheezing or rales.   Abdominal:      General: Abdomen is flat. Bowel sounds are normal.      Palpations: Abdomen is soft. There is no mass.      Tenderness: There is no abdominal tenderness. There is no right CVA tenderness, left CVA tenderness, guarding or rebound.      Hernia: No hernia is present.   Musculoskeletal:         General: No swelling, tenderness or signs of injury. Normal range of motion.      Cervical back: Normal range of motion.      Comments: No knee pain with palpation.  Crepitus appreciated with left knee flexion and extension.  No calf pain to palpation.  No swelling, bruising, erythema.   Skin:     General: Skin is warm and dry.      Findings: No rash.   Neurological:      General: No focal deficit present.      Mental Status: She is alert and oriented to person, place, and time.      Motor: No weakness.      Coordination: Coordination normal.      Gait: Gait normal.      Deep Tendon Reflexes: Reflexes normal.   Psychiatric:         Mood and Affect: Mood normal.         Behavior: Behavior normal.         Thought Content: Thought content normal.         Judgment: Judgment normal.                Signed Electronically by: Bertha Zelaya PA-C

## 2024-05-28 ENCOUNTER — HOSPITAL ENCOUNTER (OUTPATIENT)
Dept: GENERAL RADIOLOGY | Facility: OTHER | Age: 24
Discharge: HOME | End: 2024-05-28
Attending: PHYSICIAN ASSISTANT | Admitting: PHYSICIAN ASSISTANT
Payer: COMMERCIAL

## 2024-05-28 DIAGNOSIS — M25.562 ACUTE PAIN OF LEFT KNEE: ICD-10-CM

## 2024-05-28 PROCEDURE — 73560 X-RAY EXAM OF KNEE 1 OR 2: CPT | Mod: RT

## 2024-05-29 ENCOUNTER — MYC MEDICAL ADVICE (OUTPATIENT)
Dept: FAMILY MEDICINE | Facility: OTHER | Age: 24
End: 2024-05-29

## 2024-05-29 ENCOUNTER — LAB (OUTPATIENT)
Dept: LAB | Facility: OTHER | Age: 24
End: 2024-05-29
Attending: PHYSICIAN ASSISTANT
Payer: COMMERCIAL

## 2024-05-29 DIAGNOSIS — R10.13 EPIGASTRIC PAIN: ICD-10-CM

## 2024-05-29 DIAGNOSIS — K21.9 GASTROESOPHAGEAL REFLUX DISEASE, UNSPECIFIED WHETHER ESOPHAGITIS PRESENT: ICD-10-CM

## 2024-05-29 DIAGNOSIS — R19.7 DIARRHEA, UNSPECIFIED TYPE: ICD-10-CM

## 2024-05-29 LAB
ADV 40+41 DNA STL QL NAA+NON-PROBE: NEGATIVE
ASTRO TYP 1-8 RNA STL QL NAA+NON-PROBE: NEGATIVE
C CAYETANENSIS DNA STL QL NAA+NON-PROBE: NEGATIVE
C DIFF TOX B STL QL: NEGATIVE
CAMPYLOBACTER DNA SPEC NAA+PROBE: NEGATIVE
CRYPTOSP DNA STL QL NAA+NON-PROBE: NEGATIVE
E COLI O157 DNA STL QL NAA+NON-PROBE: NORMAL
E HISTOLYT DNA STL QL NAA+NON-PROBE: NEGATIVE
EAEC ASTA GENE ISLT QL NAA+PROBE: NEGATIVE
EC STX1+STX2 GENES STL QL NAA+NON-PROBE: NEGATIVE
EPEC EAE GENE STL QL NAA+NON-PROBE: NEGATIVE
ETEC LTA+ST1A+ST1B TOX ST NAA+NON-PROBE: NEGATIVE
G LAMBLIA DNA STL QL NAA+NON-PROBE: NEGATIVE
H PYLORI AG STL QL IA: NEGATIVE
NOROVIRUS GI+II RNA STL QL NAA+NON-PROBE: NEGATIVE
P SHIGELLOIDES DNA STL QL NAA+NON-PROBE: NEGATIVE
RVA RNA STL QL NAA+NON-PROBE: NEGATIVE
SALMONELLA SP RPOD STL QL NAA+PROBE: NEGATIVE
SAPO I+II+IV+V RNA STL QL NAA+NON-PROBE: NEGATIVE
SHIGELLA SP+EIEC IPAH ST NAA+NON-PROBE: NEGATIVE
V CHOLERAE DNA SPEC QL NAA+PROBE: NEGATIVE
VIBRIO DNA SPEC NAA+PROBE: NEGATIVE
Y ENTEROCOL DNA STL QL NAA+PROBE: NEGATIVE

## 2024-05-29 PROCEDURE — 87493 C DIFF AMPLIFIED PROBE: CPT | Mod: ZL,XU

## 2024-05-29 PROCEDURE — 87507 IADNA-DNA/RNA PROBE TQ 12-25: CPT | Mod: ZL

## 2024-05-29 PROCEDURE — 87338 HPYLORI STOOL AG IA: CPT | Mod: ZL

## 2024-06-03 ENCOUNTER — OFFICE VISIT (OUTPATIENT)
Dept: OBGYN | Facility: OTHER | Age: 24
End: 2024-06-03
Attending: STUDENT IN AN ORGANIZED HEALTH CARE EDUCATION/TRAINING PROGRAM
Payer: COMMERCIAL

## 2024-06-03 VITALS
SYSTOLIC BLOOD PRESSURE: 134 MMHG | WEIGHT: 205.7 LBS | BODY MASS INDEX: 37.62 KG/M2 | HEART RATE: 90 BPM | DIASTOLIC BLOOD PRESSURE: 86 MMHG

## 2024-06-03 DIAGNOSIS — A64 SEXUALLY TRANSMITTED DISEASE (STD): ICD-10-CM

## 2024-06-03 DIAGNOSIS — Z12.4 ENCOUNTER FOR PAPANICOLAOU SMEAR OF CERVIX: Primary | ICD-10-CM

## 2024-06-03 LAB
C TRACH DNA SPEC QL PROBE+SIG AMP: NEGATIVE
N GONORRHOEA DNA SPEC QL NAA+PROBE: NEGATIVE

## 2024-06-03 PROCEDURE — 58301 REMOVE INTRAUTERINE DEVICE: CPT | Performed by: STUDENT IN AN ORGANIZED HEALTH CARE EDUCATION/TRAINING PROGRAM

## 2024-06-03 PROCEDURE — G0463 HOSPITAL OUTPT CLINIC VISIT: HCPCS

## 2024-06-03 PROCEDURE — 99212 OFFICE O/P EST SF 10 MIN: CPT | Mod: 25 | Performed by: STUDENT IN AN ORGANIZED HEALTH CARE EDUCATION/TRAINING PROGRAM

## 2024-06-03 PROCEDURE — G0123 SCREEN CERV/VAG THIN LAYER: HCPCS | Performed by: STUDENT IN AN ORGANIZED HEALTH CARE EDUCATION/TRAINING PROGRAM

## 2024-06-03 PROCEDURE — 87491 CHLMYD TRACH DNA AMP PROBE: CPT | Mod: ZL | Performed by: STUDENT IN AN ORGANIZED HEALTH CARE EDUCATION/TRAINING PROGRAM

## 2024-06-03 ASSESSMENT — PATIENT HEALTH QUESTIONNAIRE - PHQ9
SUM OF ALL RESPONSES TO PHQ QUESTIONS 1-9: 19
10. IF YOU CHECKED OFF ANY PROBLEMS, HOW DIFFICULT HAVE THESE PROBLEMS MADE IT FOR YOU TO DO YOUR WORK, TAKE CARE OF THINGS AT HOME, OR GET ALONG WITH OTHER PEOPLE: VERY DIFFICULT
SUM OF ALL RESPONSES TO PHQ QUESTIONS 1-9: 19

## 2024-06-03 NOTE — NURSING NOTE
Pt presents to clinic today for IUD removal.      Medication Reconciliation: complete  Alayna Crump LPN

## 2024-06-03 NOTE — PROGRESS NOTES
GYN Visit    S: Ms. Clotilde Dorantes is a 23 year old  here for IUD removal, pap smear and GC/CT testing. She notes that since she has had the Mirena IUD she has had some worsening side effects with mood and feeling like her hormones are off. She has had regular period with the IUD in place. She denies any pain with it.     She denies any vaginal discharge or bleeding outside of her menses.     O:  /86   Pulse 90   Wt 93.3 kg (205 lb 11.2 oz)   LMP 2024 (Exact Date)   BMI 37.62 kg/m    Gen: Well-appearing, NAD  Pulm: nonlabored  Abd: Soft, non-tender, non-distended  Ext: No LE edema, extremities warm and well perfused    Pelvic:  Normal appearing external female genitalia. Normal hair distribution. Vagina is without lesions. No vaginal discharge. Cervix parous, no lesions, no cervical motion tenderness. Uterus is small, mobile, non-tender. No adnexal tenderness or masses. Pap smear and GC/CT collected today.    IUD removal  Ms. Dorantes verbally consented to IUD removal. She was assisted into a lithotomy position and the speculum was gently inserted to provide visualization of the cervix. The IUD strings were noted to be at the external os and gently grasped with a ring forcep. The IUD was removed and inspected, found to be intact. She tolerated the procedure well.    A/P:  Ms. Clotilde Dorantes is a 23 year old  here for IUD removal, cervical cancer screening, GC/CT screening.    # Mirena IUD removal  -- No complications with removal: she understands she is immediately uncontracepted and plans to use condoms    # Cervical cancer screening  -- Pap collected today      Total amount of time spent during today's encounter including chart prep, face to face, exam, procedure and documentation was 15 minutes. 5 minutes were dedicated to the exam and procedure portion  Rae Desai MD on 6/3/2024 at 3:08 PM

## 2024-06-06 ENCOUNTER — MYC MEDICAL ADVICE (OUTPATIENT)
Dept: OBGYN | Facility: OTHER | Age: 24
End: 2024-06-06
Payer: COMMERCIAL

## 2024-06-12 NOTE — PROGRESS NOTES
Patient attended session 9 of outpatient Phase 2 cardiac rehab. See scanned in exercise session report in media tab.     Return OB Visit    S: Patient is feeling pretty good overall, just uncomfortable. No ctx, VB or LOF. +FM    O: /82 (BP Location: Right arm, Patient Position: Sitting, Cuff Size: Adult Large)   Pulse 88   Wt 101.7 kg (224 lb 4.8 oz)   LMP 2020   Breastfeeding No   BMI 41.03 kg/m    Gen: Well-appearing, NAD  See OB Flowsheet    A/P:  Clotilde Dorantes is a 20 year old  at 34w5d by LMP c/w 8w0d US, here for return OB visit.  MTHFR mutation: does not need anticoagulation during pregnancy or postpartum.   Depression: no meds, currently stable. Plan to restart PP or sooner prn  Anemia: on iron  Plans breastfeeding, no epidural  Undecided on contraception, reviewed options     PNC: Rh positive, Rubella immune,   Genetics: normal CF and SMA screen, and Corona  Imaging: dating US at 8w0d, normal anatomy survey after follow up  Immunizations: none  RTC 2 weeks- GBS next visit    Claudia Cardenas MD  OB/GYN  2021 3:56 PM

## 2024-06-13 ENCOUNTER — TELEPHONE (OUTPATIENT)
Dept: OBGYN | Facility: OTHER | Age: 24
End: 2024-06-13
Payer: COMMERCIAL

## 2024-06-13 LAB
BKR LAB AP GYN ADEQUACY: ABNORMAL
BKR LAB AP GYN INTERPRETATION: ABNORMAL
BKR LAB AP GYN OTHER FINDINGS: ABNORMAL
BKR LAB AP HPV REFLEX: NO
BKR LAB AP PREVIOUS ABNORMAL: ABNORMAL
PATH REPORT.COMMENTS IMP SPEC: ABNORMAL
PATH REPORT.COMMENTS IMP SPEC: ABNORMAL
PATH REPORT.RELEVANT HX SPEC: ABNORMAL

## 2024-06-13 NOTE — TELEPHONE ENCOUNTER
After verifying pt last name and date of birth, pt was given information about why we recommend a repeat PAP next year and not now, pt asks if she needs to do more testing right now. Nurse gave more information about PAP testing and when and why we do it. Pt states that she understands and states she will follow up in a year.     Yana Duque RN on 6/13/2024 at 2:24 PM

## 2024-06-13 NOTE — TELEPHONE ENCOUNTER
Clotilde has some questions about her pap smear results and would like to talk with a nurse.  Caridad Wells on 6/13/2024 at 2:11 PM

## 2024-06-17 ENCOUNTER — HOSPITAL ENCOUNTER (EMERGENCY)
Facility: OTHER | Age: 24
Discharge: HOME | End: 2024-06-17
Payer: COMMERCIAL

## 2024-06-17 VITALS
RESPIRATION RATE: 18 BRPM | SYSTOLIC BLOOD PRESSURE: 135 MMHG | DIASTOLIC BLOOD PRESSURE: 80 MMHG | HEART RATE: 90 BPM | OXYGEN SATURATION: 95 % | TEMPERATURE: 97.6 F

## 2024-06-17 DIAGNOSIS — J02.0 STREP THROAT: ICD-10-CM

## 2024-06-17 LAB
FLUAV RNA SPEC QL NAA+PROBE: NEGATIVE
FLUBV RNA RESP QL NAA+PROBE: NEGATIVE
GROUP A STREP BY PCR: DETECTED
RSV RNA SPEC NAA+PROBE: NEGATIVE
SARS-COV-2 RNA RESP QL NAA+PROBE: NEGATIVE

## 2024-06-17 PROCEDURE — 87651 STREP A DNA AMP PROBE: CPT

## 2024-06-17 PROCEDURE — 87637 SARSCOV2&INF A&B&RSV AMP PRB: CPT

## 2024-06-17 PROCEDURE — 250N000011 HC RX IP 250 OP 636

## 2024-06-17 PROCEDURE — 99283 EMERGENCY DEPT VISIT LOW MDM: CPT

## 2024-06-17 PROCEDURE — 250N000013 HC RX MED GY IP 250 OP 250 PS 637

## 2024-06-17 RX ORDER — ONDANSETRON 4 MG/1
4 TABLET, ORALLY DISINTEGRATING ORAL ONCE
Status: COMPLETED | OUTPATIENT
Start: 2024-06-17 | End: 2024-06-17

## 2024-06-17 RX ORDER — AMOXICILLIN 500 MG/1
500 CAPSULE ORAL ONCE
Status: COMPLETED | OUTPATIENT
Start: 2024-06-17 | End: 2024-06-17

## 2024-06-17 RX ORDER — AMOXICILLIN 500 MG/1
1000 CAPSULE ORAL 2 TIMES DAILY
Qty: 40 CAPSULE | Refills: 0 | Status: SHIPPED | OUTPATIENT
Start: 2024-06-17 | End: 2024-06-27

## 2024-06-17 RX ADMIN — AMOXICILLIN 500 MG: 500 CAPSULE ORAL at 20:08

## 2024-06-17 RX ADMIN — ONDANSETRON 4 MG: 4 TABLET, ORALLY DISINTEGRATING ORAL at 20:08

## 2024-06-17 ASSESSMENT — ENCOUNTER SYMPTOMS
COUGH: 1
CHEST TIGHTNESS: 1
SORE THROAT: 1
VOMITING: 1

## 2024-06-17 ASSESSMENT — COLUMBIA-SUICIDE SEVERITY RATING SCALE - C-SSRS
1. IN THE PAST MONTH, HAVE YOU WISHED YOU WERE DEAD OR WISHED YOU COULD GO TO SLEEP AND NOT WAKE UP?: NO
6. HAVE YOU EVER DONE ANYTHING, STARTED TO DO ANYTHING, OR PREPARED TO DO ANYTHING TO END YOUR LIFE?: NO
2. HAVE YOU ACTUALLY HAD ANY THOUGHTS OF KILLING YOURSELF IN THE PAST MONTH?: NO

## 2024-06-17 ASSESSMENT — ACTIVITIES OF DAILY LIVING (ADL): ADLS_ACUITY_SCORE: 35

## 2024-06-18 ASSESSMENT — ENCOUNTER SYMPTOMS
TROUBLE SWALLOWING: 0
ABDOMINAL PAIN: 0
FEVER: 0

## 2024-06-18 NOTE — DISCHARGE INSTRUCTIONS
Return to be seen:     You have new or worse symptoms of infection, such as:  A new or higher fever.  A fever with a stiff neck or severe headache.  New or worse trouble swallowing.  Pain that becomes much worse on one side of your throat.   Watch closely for changes in your health, and be sure to contact your doctor if:    You are not getting better after 2 days (48 hours) after taking an antibiotic.     Tylenol and ibuprofen as needed for pain and fever.  Information for symptom control included in handout attached to your discharge paperwork  Hope you feel better soon.  If your son is having symptoms he can be seen either here or in the emergency department evaluation.  Oxacillin sent to your pharmacy for you to start tomorrow you did receive a dose here in the ER this will be taken for 10 days

## 2024-06-18 NOTE — ED PROVIDER NOTES
History     Chief Complaint   Patient presents with    Flu Symptoms     The history is provided by the patient and medical records.     Clotilde Dorantes is a 23 year old female who presents to the emergency department today complaining of a sore throat that started this morning when she woke up.  She is also feeling congested, she has had a cough, and sometimes coughing so hard that she has thrown up.  She reports coughing so much is caused her to have some chest discomfort.  She is not sure if she has had any fevers.  Patient denies shortness of breath.        Allergies:  Allergies   Allergen Reactions    Tramadol Other (See Comments)     Ankle swelling and pain  Other reaction(s): Joint Pain  Ankle issues       Problem List:    There are no problems to display for this patient.       Past Medical History:    Past Medical History:   Diagnosis Date    Anemia due to blood loss, acute 9/17/2021    MTHFR gene mutation        Past Surgical History:    Past Surgical History:   Procedure Laterality Date    wisdom teeth         Family History:    Family History   Problem Relation Age of Onset    Genetic Disorder Mother         MTHFR mutation    Pulmonary Embolism Mother         2018, no issues during pregnancy    Stomach Problem Mother         ulcer    GERD Mother     Thyroid Cancer Father     Heart Disease Maternal Grandmother     Diabetes Maternal Grandfather     Breast Cancer Paternal Aunt         dx late 40s       Social History:  Marital Status:  Single [1]  Social History     Tobacco Use    Smoking status: Every Day     Types: Vaping Device     Start date: 03/2022    Smokeless tobacco: Never    Tobacco comments:     Not smoking tobacco, currently vaping   Vaping Use    Vaping status: Every Day    Substances: Nicotine, Flavoring    Devices: Disposable   Substance Use Topics    Alcohol use: Not Currently     Comment: occ    Drug use: Not Currently     Types: Marijuana     Comment: Has not used marijuana during  pregnancy        Medications:    amoxicillin (AMOXIL) 500 MG capsule  omeprazole (PRILOSEC) 20 MG DR capsule          Review of Systems   Constitutional:  Negative for fever.   HENT:  Positive for congestion and sore throat. Negative for ear pain and trouble swallowing.    Respiratory:  Positive for cough and chest tightness.    Gastrointestinal:  Positive for vomiting. Negative for abdominal pain.   All other systems reviewed and are negative.  See HPI    Physical Exam   BP: 135/80  Pulse: 90  Temp: 97.6  F (36.4  C)  Resp: 18  SpO2: 95 %      Physical Exam  Vitals and nursing note reviewed.   Constitutional:       General: She is not in acute distress.     Appearance: Normal appearance. She is not ill-appearing or toxic-appearing.   HENT:      Head: Normocephalic.      Right Ear: Tympanic membrane normal.      Left Ear: Tympanic membrane normal.      Nose: Nose normal.      Mouth/Throat:      Mouth: Mucous membranes are moist.      Pharynx: Uvula midline. Posterior oropharyngeal erythema present. No oropharyngeal exudate or uvula swelling.      Tonsils: No tonsillar exudate or tonsillar abscesses. 2+ on the right. 2+ on the left.   Eyes:      Pupils: Pupils are equal, round, and reactive to light.   Cardiovascular:      Rate and Rhythm: Normal rate and regular rhythm.      Pulses: Normal pulses.   Pulmonary:      Effort: Pulmonary effort is normal.      Breath sounds: Normal breath sounds.   Abdominal:      Palpations: Abdomen is soft.   Musculoskeletal:         General: Normal range of motion.      Cervical back: Full passive range of motion without pain and neck supple.   Lymphadenopathy:      Head:      Right side of head: Tonsillar adenopathy present.      Left side of head: Tonsillar adenopathy present.   Skin:     General: Skin is warm.      Capillary Refill: Capillary refill takes less than 2 seconds.   Neurological:      General: No focal deficit present.      Mental Status: She is alert and oriented to  person, place, and time.   Psychiatric:         Mood and Affect: Mood normal.         ED Course         Results for orders placed or performed during the hospital encounter of 06/17/24 (from the past 24 hour(s))   Symptomatic Influenza A/B, RSV, & SARS-CoV2 PCR (COVID-19) Nose    Specimen: Nose; Swab   Result Value Ref Range    Influenza A PCR Negative Negative    Influenza B PCR Negative Negative    RSV PCR Negative Negative    SARS CoV2 PCR Negative Negative    Narrative    Testing was performed using the Xpert Xpress CoV2/Flu/RSV Assay on the [employer] Instrument. This test should be ordered for the detection of SARS-CoV-2, influenza, and RSV viruses in individuals who meet clinical and/or epidemiological criteria. Test performance is unknown in asymptomatic patients. This test is for in vitro diagnostic use under the FDA EUA for laboratories certified under CLIA to perform high or moderate complexity testing. This test has not been FDA cleared or approved. A negative result does not rule out the presence of PCR inhibitors in the specimen or target RNA in concentration below the limit of detection for the assay. If only one viral target is positive but coinfection with multiple targets is suspected, the sample should be re-tested with another FDA cleared, approved, or authorized test, if coinfection would change clinical management. This test was validated by the [facility]. These laboratories are certified under the Clinical Laboratory Improvement Amendments of 1988 (CLIA-88) as qualified to perform high complexity laboratory testing.   Group A Streptococcus PCR Throat Swab    Specimen: Throat; Swab   Result Value Ref Range    Group A strep by PCR Detected (A) Not Detected    Narrative    The Xpert Xpress Strep A test, performed on the [employer]  Instr[employer] Systems, is a rapid, qualitative in vitro diagnostic test for the detection of Streptococcus pyogenes (Group A ß-hemolytic  Streptococcus, Strep A) in throat swab specimens from patients with signs and symptoms of pharyngitis. The Xpert Xpress Strep A test can be used as an aid in the diagnosis of Group A Streptococcal pharyngitis. The assay is not intended to monitor treatment for Group A Streptococcus infections. The Xpert Xpress Strep A test utilizes an automated real-time polymerase chain reaction (PCR) to detect Streptococcus pyogenes DNA.       Medications   ondansetron (ZOFRAN ODT) ODT tab 4 mg (4 mg Oral $Given 6/17/24 2008)   amoxicillin (AMOXIL) capsule 500 mg (500 mg Oral $Given 6/17/24 2008)       Assessments & Plan (with Medical Decision Making)  /80   Pulse 90   Temp 97.6  F (36.4  C)   Resp 18   LMP 05/22/2024 (Exact Date)   SpO2 95% vitally stable and afebrile  Patient is alert and oriented and nondistressed.  Breathing is easy unlabored, lung sounds are clear.  Posterior oropharynx erythema, no exudate or abscess.   Viral swab- negative;  strep PCR positive  Zofran and amoxicillin given here  Patient to discharge home in stable condition, verbal and handout information given on strep pharyngitis and how to manage symptoms at home, also to prevent giving strep to others in the household, she is prescribed amoxicillin, patient has Zofran at home does not need a prescription for this today.  She is advised to return if she has any new or worsening concerns, patient gave verbal understanding of discharge information.     I have reviewed the nursing notes.    I have reviewed the findings, diagnosis, plan and need for follow up with the patient.    Medical Decision Making  The patient's presentation was of straightforward complexity (a clearly self-limited or minor problem).    The patient's evaluation involved:  ordering and/or review of 2 test(s) in this encounter (see separate area of note for details)    The patient's management necessitated moderate risk (prescription drug management including medications given  in the ED).        Current Discharge Medication List        START taking these medications    Details   amoxicillin (AMOXIL) 500 MG capsule Take 2 capsules (1,000 mg) by mouth 2 times daily for 10 days  Qty: 40 capsule, Refills: 0             Final diagnoses:   Strep throat       6/17/2024   [facility]Linnette jaen, APRN CNP  06/18/24 0027

## 2024-06-18 NOTE — ED TRIAGE NOTES
Patient woke up today with a sore throat. States she has a congested cough. Has vomited several times when attempting to eat.      Triage Assessment (Adult)       Row Name 06/17/24 8107          Triage Assessment    Airway WDL WDL        Respiratory WDL    Respiratory WDL X;cough     Cough Frequency frequent     Cough Type congested        Skin Circulation/Temperature WDL    Skin Circulation/Temperature WDL WDL        Cardiac WDL    Cardiac WDL WDL        Peripheral/Neurovascular WDL    Peripheral Neurovascular WDL WDL        Cognitive/Neuro/Behavioral WDL    Cognitive/Neuro/Behavioral WDL WDL

## 2024-07-17 ENCOUNTER — TRANSFERRED RECORDS (OUTPATIENT)
Dept: HEALTH INFORMATION MANAGEMENT | Facility: OTHER | Age: 24
End: 2024-07-17
Payer: COMMERCIAL

## 2024-11-21 ENCOUNTER — HOSPITAL ENCOUNTER (EMERGENCY)
Facility: OTHER | Age: 24
Discharge: HOME | End: 2024-11-21
Payer: COMMERCIAL

## 2024-11-21 ENCOUNTER — APPOINTMENT (OUTPATIENT)
Dept: GENERAL RADIOLOGY | Facility: OTHER | Age: 24
End: 2024-11-21
Payer: COMMERCIAL

## 2024-11-21 VITALS
DIASTOLIC BLOOD PRESSURE: 92 MMHG | SYSTOLIC BLOOD PRESSURE: 150 MMHG | BODY MASS INDEX: 36.8 KG/M2 | WEIGHT: 200 LBS | TEMPERATURE: 98.8 F | HEART RATE: 86 BPM | RESPIRATION RATE: 16 BRPM | OXYGEN SATURATION: 96 % | HEIGHT: 62 IN

## 2024-11-21 DIAGNOSIS — S92.354A CLOSED NONDISPLACED FRACTURE OF FIFTH METATARSAL BONE OF RIGHT FOOT, INITIAL ENCOUNTER: ICD-10-CM

## 2024-11-21 PROCEDURE — 99283 EMERGENCY DEPT VISIT LOW MDM: CPT

## 2024-11-21 PROCEDURE — 250N000013 HC RX MED GY IP 250 OP 250 PS 637

## 2024-11-21 PROCEDURE — 73610 X-RAY EXAM OF ANKLE: CPT | Mod: RT

## 2024-11-21 PROCEDURE — 73630 X-RAY EXAM OF FOOT: CPT | Mod: RT

## 2024-11-21 RX ORDER — ACETAMINOPHEN 325 MG/1
975 TABLET ORAL ONCE
Status: COMPLETED | OUTPATIENT
Start: 2024-11-21 | End: 2024-11-21

## 2024-11-21 RX ADMIN — ACETAMINOPHEN 975 MG: 325 TABLET, FILM COATED ORAL at 15:47

## 2024-11-21 RX ADMIN — IBUPROFEN 600 MG: 200 TABLET, FILM COATED ORAL at 15:46

## 2024-11-21 ASSESSMENT — COLUMBIA-SUICIDE SEVERITY RATING SCALE - C-SSRS
6. HAVE YOU EVER DONE ANYTHING, STARTED TO DO ANYTHING, OR PREPARED TO DO ANYTHING TO END YOUR LIFE?: NO
2. HAVE YOU ACTUALLY HAD ANY THOUGHTS OF KILLING YOURSELF IN THE PAST MONTH?: NO
1. IN THE PAST MONTH, HAVE YOU WISHED YOU WERE DEAD OR WISHED YOU COULD GO TO SLEEP AND NOT WAKE UP?: NO

## 2024-11-21 ASSESSMENT — ACTIVITIES OF DAILY LIVING (ADL)
ADLS_ACUITY_SCORE: 0
ADLS_ACUITY_SCORE: 0

## 2024-11-21 NOTE — DISCHARGE INSTRUCTIONS
Follow-up in the orthopedic clinic, referral was placed for you today and call and set up an appointment tomorrow when the clinic is open.  Tylenol, ice, ibuprofen elevation to foot and ankle to help with the swelling and pain.   Weightbearing as tolerated.   Return to be seen if new or worsening concerns such as:Call your doctor now or seek immediate medical care if:    You have problems with your cast or splint. For example:  The skin under the cast or splint is burning or stinging.  The cast or splint feels too tight.  There is a lot of swelling near the cast or splint. (Some swelling is normal.)  You have a new fever.  There is drainage or a bad smell coming from the cast or splint.     You have increased or severe pain.     You have tingling, weakness, or numbness in your foot and toes.     You cannot move your toes.     Your foot turns cold or changes color.   Watch closely for changes in your health, and be sure to contact your doctor if:    The pain does not get better day by day.     You do not get better as expected.     Nice to meet you today. You are excused from mother duties at home until feeling well.

## 2024-11-21 NOTE — ED PROVIDER NOTES
History     Chief Complaint   Patient presents with    Foot Injury     The history is provided by the patient and medical records.     Clotilde Dorantes is a 24 year old female who presents to the emergency department complaining of right ankle and right foot pain after she fell this afternoon getting up out of a chair.  She reports that when she stood up she bent her foot and is now experiencing lateral foot and lateral ankle pain and swelling.  She did not take any medications prior to arrival for the pain she is not injured this foot in the past. Denies other injury from the fall.    Allergies:  Allergies   Allergen Reactions    Tramadol Other (See Comments)     Ankle swelling and pain  Other reaction(s): Joint Pain  Ankle issues       Problem List:    There are no active problems to display for this patient.       Past Medical History:    Past Medical History:   Diagnosis Date    Anemia due to blood loss, acute 9/17/2021    MTHFR gene mutation        Past Surgical History:    Past Surgical History:   Procedure Laterality Date    wisdom teeth         Family History:    Family History   Problem Relation Age of Onset    Genetic Disorder Mother         MTHFR mutation    Pulmonary Embolism Mother         2018, no issues during pregnancy    Stomach Problem Mother         ulcer    GERD Mother     Thyroid Cancer Father     Heart Disease Maternal Grandmother     Diabetes Maternal Grandfather     Breast Cancer Paternal Aunt         dx late 40s       Social History:  Marital Status:  Single [1]  Social History     Tobacco Use    Smoking status: Every Day     Types: Vaping Device     Start date: 03/2022    Smokeless tobacco: Never    Tobacco comments:     Not smoking tobacco, currently vaping   Vaping Use    Vaping status: Every Day    Substances: Nicotine, Flavoring    Devices: Disposable   Substance Use Topics    Alcohol use: Not Currently     Comment: occ    Drug use: Not Currently     Types: Marijuana     Comment:  "Has not used marijuana during pregnancy        Medications:    omeprazole (PRILOSEC) 20 MG DR capsule        Review of Systems   Musculoskeletal:         Right ankle and foot pain   All other systems reviewed and are negative.  See HPI    Physical Exam   BP: (!) 150/92  Pulse: 86  Temp: 98.8  F (37.1  C)  Resp: 16  Height: 157.5 cm (5' 2\")  Weight: 90.7 kg (200 lb)  SpO2: 96 %      Physical Exam  Vitals and nursing note reviewed.   Constitutional:       General: She is not in acute distress.     Appearance: She is not ill-appearing or toxic-appearing.   HENT:      Head: Normocephalic.   Cardiovascular:      Rate and Rhythm: Normal rate and regular rhythm.      Pulses: Normal pulses.           Dorsalis pedis pulses are 2+ on the right side and 2+ on the left side.   Pulmonary:      Effort: Pulmonary effort is normal.   Musculoskeletal:      Cervical back: Neck supple.      Right ankle: Swelling present. Tenderness present over the lateral malleolus. Normal pulse.      Right Achilles Tendon: Normal.      Right foot: Normal capillary refill. Swelling and tenderness present. No deformity or crepitus. Normal pulse.        Legs:       Comments: Decreased ROM r/t pain lateral ankle, foot. Mild bruising lateral foot. Pain over 5th metatarsal   Skin:     General: Skin is warm.      Capillary Refill: Capillary refill takes less than 2 seconds.   Neurological:      General: No focal deficit present.      Mental Status: She is alert and oriented to person, place, and time.   Psychiatric:         Mood and Affect: Mood normal.         Behavior: Behavior is cooperative.         ED Course         Results for orders placed or performed during the hospital encounter of 11/21/24 (from the past 24 hours)   XR Foot Right G/E 3 Views    Narrative    XR FOOT RIGHT G/E 3 VIEWS    HISTORY: 24 years Female fall, pain over 3rd-5th metatarsals    COMPARISON: None    TECHNIQUE: Right foot 3 views    FINDINGS: There is a nondisplaced fracture " "involving the base of the  fifth metatarsal.    Joint spaces are congruent.      Impression    IMPRESSION: There is a nondisplaced fracture involving the base of the  fifth metatarsal    UMA REAL MD         SYSTEM ID:  [id number]   XR Ankle Right G/E 3 Views    Narrative    XR ANKLE RIGHT G/E 3 VIEWS    HISTORY: 24 years Female fall, lateral ankle pain and swelling    COMPARISON: None    TECHNIQUE: Right ankle 3 views    FINDINGS: Ankle mortise is congruent. Articular surfaces are smooth.  There is no evidence of fracture or dislocation of the ankle.    There is a nondisplaced fracture of the base of the fifth metatarsal.      Impression    IMPRESSION: Nondisplaced fifth metatarsal base fracture, avulsion type  fracture involving the attachment of the peroneal brevis tendon. No  evidence of fracture dislocation of the ankle otherwise. The ankle  mortise is congruent.    UMA REAL MD         SYSTEM ID:  [id number]       Medications   acetaminophen (TYLENOL) tablet 975 mg (975 mg Oral $Given 11/21/24 1547)   ibuprofen (ADVIL/MOTRIN) tablet 600 mg (600 mg Oral $Given 11/21/24 1546)       Assessments & Plan (with Medical Decision Making)  Clotilde Dorantes is a 24 year old female who presents to the emergency department complaining of right ankle and right foot pain after she fell this afternoon getting up out of a chair.  She reports that when she stood up she bent her foot and is now experiencing lateral foot and lateral ankle pain and swelling.  She did not take any medications prior to arrival for the pain she is not injured this foot in the past. Denies other injury from the fall.  BP (!) 150/92   Pulse 86   Temp 98.8  F (37.1  C)   Resp 16   Ht 1.575 m (5' 2\")   Wt 90.7 kg (200 lb)   SpO2 96%   BMI 36.58 kg/m    she is vitally stable.   Patient is alert and oriented and nondistressed.  She has swelling to the lateral aspect of her right ankle and right foot.  Tenderness to lateral ankle and " lateral foot over neurovascular status is intact. Limited ROM to left ankle r/t pain.  Labs & Radiology results interpreted by radiologist:   ED Course as of 11/22/24 0151   u Nov 21, 2024   1625 XR Foot Right G/E 3 Views  IMPRESSION: Nondisplaced fifth metatarsal base fracture, avulsion type  fracture involving the attachment of the peroneal brevis tendon. No  evidence of fracture dislocation of the ankle otherwise. The ankle  mortise is congruent     1625 XR Ankle Right G/E 3 Views  Ankle mortise is congruent. Articular surfaces are smooth.  There is no evidence of fracture or dislocation of the ankle.        Meds: She is given Tylenol and ibuprofen.  Ice was applied to right foot and ankle  Consulted with on call orthopedic surgeon, Dr. Mcnair- recommended CAM boot, WBAT, follow up with orthopedic in clinic- referral was placed.   Discussed tylenol, ibuprofen, ice, elevation for pain control, swelling.   Patient discharged home in stable condition advised to return if she has new or worsening concerns.     I have reviewed the nursing notes.    I have reviewed the findings, diagnosis, plan and need for follow up with the patient.    Medical Decision Making  The patient's presentation was of low complexity (an acute and uncomplicated illness or injury).    The patient's evaluation involved:  ordering and/or review of 2 test(s) in this encounter (see separate area of note for details)  discussion of management or test interpretation with another health professional (see separate area of note for details)    The patient's management necessitated only low risk treatment.      Discharge Medication List as of 11/21/2024  5:20 PM          Final diagnoses:   Closed nondisplaced fracture of fifth metatarsal bone of right foot, initial encounter       11/21/2024   [facility] AND [facility] Linnette, APRN CNP  11/22/24 0152

## 2024-11-21 NOTE — ED TRIAGE NOTES
Pt arrives via private vehicle from home with c/o right foot injury. Pt states she was standing out of chair, tripped over self, heard snapping in right foot, painful to walk on and wiggle toes. Fell about 1300. No otc meds pta.      Triage Assessment (Adult)       Row Name 11/21/24 1358          Triage Assessment    Airway WDL WDL        Respiratory WDL    Respiratory WDL WDL        Skin Circulation/Temperature WDL    Skin Circulation/Temperature WDL WDL        Cardiac WDL    Cardiac WDL WDL        Peripheral/Neurovascular WDL    Peripheral Neurovascular WDL WDL        Cognitive/Neuro/Behavioral WDL    Cognitive/Neuro/Behavioral WDL WDL

## 2024-11-22 RX ORDER — OXYCODONE HYDROCHLORIDE 5 MG/1
5 TABLET ORAL EVERY 6 HOURS PRN
Qty: 10 TABLET | Refills: 0 | Status: SHIPPED | OUTPATIENT
Start: 2024-11-22

## 2024-11-22 RX ORDER — OXYCODONE HYDROCHLORIDE 5 MG/1
5 TABLET ORAL EVERY 6 HOURS PRN
Qty: 10 TABLET | Refills: 0 | Status: SHIPPED | OUTPATIENT
Start: 2024-11-22 | End: 2024-11-22

## 2024-11-22 NOTE — PROGRESS NOTES
Pt called d/t increased pain. Provider notified and will send prescription to [facility] pharmacy

## 2024-11-22 NOTE — ED PROVIDER NOTES
Patient calls emergency department now wanting a prescription for pain medication.  Oxycodone 5 mg x 10 tabs sent to [facility] pharmacy for her.    Final diagnoses:   Closed nondisplaced fracture of fifth metatarsal bone of right foot, initial encounter             Diomedes Rachel MD  11/22/24 0857

## 2024-11-25 ENCOUNTER — OFFICE VISIT (OUTPATIENT)
Dept: FAMILY MEDICINE | Facility: OTHER | Age: 24
End: 2024-11-25
Payer: COMMERCIAL

## 2024-11-25 VITALS
DIASTOLIC BLOOD PRESSURE: 60 MMHG | RESPIRATION RATE: 16 BRPM | SYSTOLIC BLOOD PRESSURE: 118 MMHG | TEMPERATURE: 98.2 F | HEART RATE: 65 BPM | WEIGHT: 205 LBS | BODY MASS INDEX: 37.49 KG/M2 | OXYGEN SATURATION: 98 %

## 2024-11-25 DIAGNOSIS — S92.351A CLOSED FRACTURE OF BASE OF FIFTH METATARSAL BONE OF RIGHT FOOT, INITIAL ENCOUNTER: Primary | ICD-10-CM

## 2024-11-25 PROCEDURE — 28470 CLTX METATARSAL FX WO MNP EA: CPT | Performed by: FAMILY MEDICINE

## 2024-11-25 PROCEDURE — G0463 HOSPITAL OUTPT CLINIC VISIT: HCPCS | Mod: 25

## 2024-11-25 ASSESSMENT — PAIN SCALES - GENERAL: PAINLEVEL_OUTOF10: EXTREME PAIN (8)

## 2024-11-25 NOTE — PROGRESS NOTES
Sports Medicine Office Note    HPI:  24-year-old female coming in for an evaluation of a right foot injury that occurred on 11/21.  She was sitting with her legs crossed in a chair and went to get up.  She describes an inversion ankle injury.  She had immediate pain and felt a pop.  She was seen in the ER and found to have a fracture to the base of the fifth metatarsal.  She was placed in a walking boot.  She comes in today endorsing 8/10 pain.  She rates her pain as sharp, stabbing, aching, and throbbing.  Upright/weightbearing activities are bothersome.  She has been using ice and OTC medications.  She has associated bruising and swelling.  No history of fracture.      EXAM:  /60 (BP Location: Right arm, Patient Position: Sitting, Cuff Size: Adult Large)   Pulse 65   Temp 98.2  F (36.8  C) (Temporal)   Resp 16   Wt 93 kg (205 lb)   SpO2 98%   BMI 37.49 kg/m    MUSCULOSKELETAL EXAM:  RIGHT FOOT AND ANKLE  Inspection:  -No gross deformity  - Mild bruising and swelling about the midfoot and forefoot  -Scars:  None    Tenderness to palpation of the:  -Fibular head:  Negative  -Fibular shaft:  Negative  -Tibial shaft:  Negative  -Medial malleolus:  Negative  -Deltoid ligament:  Negative  -Lateral malleolus:  Negative  -ATFL:  Negative  -CFL:  Negative  -PTFL:  Negative  -Syndesmosis (AITFL):  Negative  -Midsubstance Achilles tendon:  Negative  -Achilles tendon insertion:  Negative  -Plantar fascial origin on the calcaneus:  Negative  -Base of the fifth metatarsal: Positive  -Navicular:  Negative  -Lisfranc joint:  Negative  -Metatarsals: Mild pain over the 2nd-4th metatarsals, pain over the fifth    Strength:  -Dorsiflexion:  5/5  -Plantarflexion:  5/5  -Inversion:  5/5  -Eversion:  4/5    Other:  -Intact sensation to light touch distally.  -No signs of cyanosis. Normal skin temperature.  -Knee:  No gross deformity. Normal motion.  -Left foot/ankle:  No gross deformity. No palpable tenderness. Normal  strength.      IMAGIN2024: 3 view right foot x-ray  - Transverse fracture through the base of the fifth metatarsal.  No other acute bony abnormalities.    2024: 3 view right ankle x-ray  - Base of the fifth metatarsal fracture is not well-visualized on these radiographs.  No bony abnormalities at the ankle.  Mortise appears intact.      ASSESSMENT/PLAN:  Diagnoses and all orders for this visit:  Closed fracture of base of fifth metatarsal bone of right foot, initial encounter  -     Orthopedic  Referral  -     CLOSED TX METATARSAL FX W/O MANIP, EACH    24-year-old female with a closed, nondisplaced, transverse fracture through the base of the right fifth metatarsal.  X-rays from  were personally reviewed in the office with the findings as demonstrated above by my interpretation.  She is now 4 days out from her injury.  She meets criteria for nonoperative management.  - Continue in the walking boot  - Ice, elevation, and OTC analgesics as needed  - Follow-up in 2 weeks for repeat x-rays out of the boot  - Anticipate approximately 8 total weeks of immobilization      Terence Desai MD  2024  8:27 AM    Total time spent with this patient was 26 minutes which included chart review, visualization and independent interpretation of images, time spent with the patient, and documentation.    Procedure time:  0 minute(s)

## 2024-12-09 ENCOUNTER — HOSPITAL ENCOUNTER (OUTPATIENT)
Dept: GENERAL RADIOLOGY | Facility: OTHER | Age: 24
Discharge: HOME | End: 2024-12-09
Attending: FAMILY MEDICINE
Payer: COMMERCIAL

## 2024-12-09 ENCOUNTER — OFFICE VISIT (OUTPATIENT)
Dept: FAMILY MEDICINE | Facility: OTHER | Age: 24
End: 2024-12-09
Attending: FAMILY MEDICINE
Payer: COMMERCIAL

## 2024-12-09 VITALS
SYSTOLIC BLOOD PRESSURE: 120 MMHG | RESPIRATION RATE: 16 BRPM | WEIGHT: 200 LBS | HEART RATE: 77 BPM | OXYGEN SATURATION: 98 % | TEMPERATURE: 97.7 F | DIASTOLIC BLOOD PRESSURE: 70 MMHG | BODY MASS INDEX: 36.58 KG/M2

## 2024-12-09 DIAGNOSIS — S92.351A CLOSED FRACTURE OF BASE OF FIFTH METATARSAL BONE OF RIGHT FOOT: Primary | ICD-10-CM

## 2024-12-09 PROCEDURE — 73630 X-RAY EXAM OF FOOT: CPT | Mod: RT

## 2024-12-09 PROCEDURE — G0463 HOSPITAL OUTPT CLINIC VISIT: HCPCS

## 2024-12-09 ASSESSMENT — PAIN SCALES - GENERAL: PAINLEVEL_OUTOF10: MODERATE PAIN (5)

## 2024-12-09 NOTE — PROGRESS NOTES
Sports Medicine Office Note    HPI:  24-year-old female coming in for follow-up evaluation of a right foot injury that occurred on .  She was sitting with her legs crossed in a chair and went to get up.  She describes an inversion ankle injury.  She felt immediate pain and a pop.  She was seen in the ER and found to have a fracture of the base of the fifth metatarsal.  She was placed in a walking boot.  She followed up in this office on .  The walking boot was continued.  She is currently endorsing 5/10 pain.  She characterizes the pain as sharp, stabbing, aching, and throbbing.      EXAM:  /70 (BP Location: Right arm, Patient Position: Sitting, Cuff Size: Adult Large)   Pulse 77   Temp 97.7  F (36.5  C) (Temporal)   Resp 16   Wt 90.7 kg (200 lb)   SpO2 98%   BMI 36.58 kg/m    MUSCULOSKELETAL EXAM:  RIGHT FOOT AND ANKLE  Inspection:  -No gross deformity  -No bruising or swelling  -Scars:  None    Tenderness to palpation of the:  -Fibular head:  Negative  -Fibular shaft:  Negative  -Tibial shaft:  Negative  -Medial malleolus:  Negative  -Deltoid ligament:  Negative  -Lateral malleolus:  Negative  -ATFL:  Negative  -CFL:  Negative  -PTFL:  Negative  -Syndesmosis (AITFL):  Negative  -Midsubstance Achilles tendon:  Negative  -Achilles tendon insertion:  Negative  -Plantar fascial origin on the calcaneus:  Negative  -Base of the fifth metatarsal: Positive  -Navicular:  Negative  -Lisfranc joint:  Negative  -Metatarsals: Positive over the fourth and fifth    Strength:  -Dorsiflexion:  5/5  -Plantarflexion:  5/5  -Inversion:  5/5  -Eversion:  4/5 with pain    Other:  -Intact sensation to light touch distally.  -No signs of cyanosis. Normal skin temperature.  -Knee:  No gross deformity. Normal motion.  -Left foot/ankle:  No gross deformity. No palpable tenderness. Normal strength.      IMAGIN2024: 3 view right foot x-ray  - Transverse fracture through the base of the fifth metatarsal.  No  other acute bony abnormalities.     11/21/2024: 3 view right ankle x-ray  - Base of the fifth metatarsal fracture is not well-visualized on these radiographs.  No bony abnormalities at the ankle.  Mortise appears intact.    12/9/2024: 3 view right foot x-ray  - Base of the fifth metatarsal fracture is again noted.  No change in bony alignment.  Interval bony callus formation is demonstrated.      ASSESSMENT/PLAN:  Diagnoses and all orders for this visit:  Closed fracture of base of fifth metatarsal bone of right foot  -     XR Foot Right G/E 3 Views    24-year-old female with a closed, nondisplaced, transverse fracture through the base of the right fifth metatarsal.  X-rays from 11/21 and 12/9 were both personally reviewed in the office with the findings as demonstrated above by my interpretation.  She is now 2 weeks and 4 days out from her injury.  We will continue nonoperative management.  - Continue in the walking boot  - Ice, elevation, and OTC analgesics as needed  - Follow-up in 5 weeks for repeat x-rays out of the boot and likely transition away from boot immobilization at that time      Terence Desai MD  12/9/2024  1:08 PM    Total time spent with this patient was 17 minutes which included chart review, visualization and independent interpretation of images, time spent with the patient, and documentation.    Procedure time:  0 minute(s)

## 2025-01-08 ENCOUNTER — OFFICE VISIT (OUTPATIENT)
Dept: FAMILY MEDICINE | Facility: OTHER | Age: 25
End: 2025-01-08
Attending: FAMILY MEDICINE
Payer: COMMERCIAL

## 2025-01-08 ENCOUNTER — HOSPITAL ENCOUNTER (OUTPATIENT)
Dept: GENERAL RADIOLOGY | Facility: OTHER | Age: 25
Discharge: HOME | End: 2025-01-08
Attending: FAMILY MEDICINE
Payer: COMMERCIAL

## 2025-01-08 VITALS
TEMPERATURE: 98.3 F | RESPIRATION RATE: 16 BRPM | WEIGHT: 199 LBS | OXYGEN SATURATION: 98 % | SYSTOLIC BLOOD PRESSURE: 122 MMHG | BODY MASS INDEX: 36.4 KG/M2 | HEART RATE: 75 BPM | DIASTOLIC BLOOD PRESSURE: 80 MMHG

## 2025-01-08 DIAGNOSIS — S92.351A CLOSED FRACTURE OF BASE OF FIFTH METATARSAL BONE OF RIGHT FOOT: Primary | ICD-10-CM

## 2025-01-08 PROCEDURE — 73630 X-RAY EXAM OF FOOT: CPT | Mod: RT

## 2025-01-08 PROCEDURE — G0463 HOSPITAL OUTPT CLINIC VISIT: HCPCS

## 2025-01-08 ASSESSMENT — PAIN SCALES - GENERAL: PAINLEVEL_OUTOF10: NO PAIN (0)

## 2025-01-08 NOTE — PROGRESS NOTES
Sports Medicine Office Note    HPI:  24-year-old female coming in for follow-up evaluation of a right foot injury that occurred on .  She was sitting with her legs crossed in a chair and went to get up.  She describes an inversion ankle injury.  She felt a pop.  She was seen in the ER and found to have a fracture of the base of the fifth metatarsal.  She was placed in a walking boot.  She followed up in this office on  and again on .  The walking boot has been continued.  She stopped wearing it 1-2 weeks ago and has done well in normal footwear.  She is currently endorsing 2/10 pain.  She characterizes this pain as dull.  No new injuries.      EXAM:  /80 (BP Location: Right arm, Patient Position: Sitting, Cuff Size: Adult Regular)   Pulse 75   Temp 98.3  F (36.8  C) (Temporal)   Resp 16   Wt 90.3 kg (199 lb)   SpO2 98%   BMI 36.40 kg/m    MUSCULOSKELETAL EXAM:  RIGHT FOOT AND ANKLE  Inspection:  -No gross deformity  -No bruising or swelling  -Scars:  None    Tenderness to palpation of the:  -Fibular head:  Negative  -Fibular shaft:  Negative  -Tibial shaft:  Negative  -Medial malleolus:  Negative  -Deltoid ligament:  Negative  -Lateral malleolus:  Negative  -ATFL:  Negative  -CFL:  Negative  -PTFL:  Negative  -Syndesmosis (AITFL):  Negative  -Midsubstance Achilles tendon:  Negative  -Achilles tendon insertion:  Negative  -Plantar fascial origin on the calcaneus:  Negative  -Base of the fifth metatarsal: Minimal pain  -Navicular:  Negative  -Lisfranc joint:  Negative  -Metatarsals:  Negative    Other:  -Intact sensation to light touch distally.  -No signs of cyanosis. Normal skin temperature.  -Knee:  No gross deformity. Normal motion.  -Left foot/ankle:  No gross deformity. No palpable tenderness. Normal strength.      IMAGIN2024: 3 view right foot x-ray  - Transverse fracture through the base of the fifth metatarsal.  No other acute bony abnormalities.     2024: 3 view  right ankle x-ray  - Base of the fifth metatarsal fracture is not well-visualized on these radiographs.  No bony abnormalities at the ankle.  Mortise appears intact.     12/9/2024: 3 view right foot x-ray  - Base of the fifth metatarsal fracture is again noted.  No change in bony alignment.  Interval bony callus formation is demonstrated.'    1/8/2025: 3 view right foot x-ray  - Interval bony callus formation demonstrated about the fracture at the base of the fifth metatarsal.  No change in bony alignment.      ASSESSMENT/PLAN:  Diagnoses and all orders for this visit:  Closed fracture of base of fifth metatarsal bone of right foot  -     XR Foot Right G/E 3 Views    24-year-old female with a closed, nondisplaced, transverse fracture through the base of the right fifth metatarsal.  X-rays from 11/21, 12/9, and 1/8 were all personally reviewed in the office with the findings as demonstrated above by my interpretation.  She is now 6 weeks and 6 days out from her injury.  She is demonstrating good clinical and radiographic evidence of healing.  - Transition away from the walking boot  - Resume normal activities as symptoms allow  - Follow-up as needed      Terence Desai MD  1/8/2025  1:06 PM    Total time spent with this patient was 14 minutes which included chart review, visualization and independent interpretation of images, time spent with the patient, and documentation.    Procedure time:  0 minute(s)

## 2025-01-28 ENCOUNTER — OFFICE VISIT (OUTPATIENT)
Dept: FAMILY MEDICINE | Facility: OTHER | Age: 25
End: 2025-01-28
Attending: NURSE PRACTITIONER
Payer: COMMERCIAL

## 2025-01-28 VITALS
HEIGHT: 62 IN | BODY MASS INDEX: 36.77 KG/M2 | WEIGHT: 199.8 LBS | TEMPERATURE: 99.1 F | SYSTOLIC BLOOD PRESSURE: 118 MMHG | DIASTOLIC BLOOD PRESSURE: 78 MMHG | OXYGEN SATURATION: 98 % | HEART RATE: 72 BPM | RESPIRATION RATE: 16 BRPM

## 2025-01-28 DIAGNOSIS — R42 DIZZINESS: Primary | ICD-10-CM

## 2025-01-28 DIAGNOSIS — R42 LIGHT-HEADED FEELING: ICD-10-CM

## 2025-01-28 LAB
ALBUMIN SERPL BCG-MCNC: 4.6 G/DL (ref 3.5–5.2)
ALP SERPL-CCNC: 55 U/L (ref 40–150)
ALT SERPL W P-5'-P-CCNC: 12 U/L (ref 0–50)
ANION GAP SERPL CALCULATED.3IONS-SCNC: 14 MMOL/L (ref 7–15)
AST SERPL W P-5'-P-CCNC: 14 U/L (ref 0–45)
BASOPHILS # BLD MANUAL: 0 10E3/UL (ref 0–0.2)
BASOPHILS NFR BLD MANUAL: 0 %
BILIRUB SERPL-MCNC: 0.7 MG/DL
BUN SERPL-MCNC: 11.2 MG/DL (ref 6–20)
CALCIUM SERPL-MCNC: 9.7 MG/DL (ref 8.8–10.4)
CHLORIDE SERPL-SCNC: 105 MMOL/L (ref 98–107)
CREAT SERPL-MCNC: 0.76 MG/DL (ref 0.51–0.95)
EGFRCR SERPLBLD CKD-EPI 2021: >90 ML/MIN/1.73M2
EOSINOPHIL # BLD MANUAL: 0.3 10E3/UL (ref 0–0.7)
EOSINOPHIL NFR BLD MANUAL: 3 %
ERYTHROCYTE [DISTWIDTH] IN BLOOD BY AUTOMATED COUNT: 14.3 % (ref 10–15)
FERRITIN SERPL-MCNC: 25 NG/ML (ref 6–175)
GLUCOSE SERPL-MCNC: 92 MG/DL (ref 70–99)
HCG UR QL: NEGATIVE
HCO3 SERPL-SCNC: 21 MMOL/L (ref 22–29)
HCT VFR BLD AUTO: 38.8 % (ref 35–47)
HGB BLD-MCNC: 13.1 G/DL (ref 11.7–15.7)
IRON BINDING CAPACITY (ROCHE): 368 UG/DL (ref 240–430)
IRON SATN MFR SERPL: 33 % (ref 15–46)
IRON SERPL-MCNC: 123 UG/DL (ref 37–145)
LYMPHOCYTES # BLD MANUAL: 2.3 10E3/UL (ref 0.8–5.3)
LYMPHOCYTES NFR BLD MANUAL: 20 %
MCH RBC QN AUTO: 28.9 PG (ref 26.5–33)
MCHC RBC AUTO-ENTMCNC: 33.8 G/DL (ref 31.5–36.5)
MCV RBC AUTO: 86 FL (ref 78–100)
MONOCYTES # BLD MANUAL: 0.2 10E3/UL (ref 0–1.3)
MONOCYTES NFR BLD MANUAL: 2 %
NEUTROPHILS # BLD MANUAL: 8.6 10E3/UL (ref 1.6–8.3)
NEUTROPHILS NFR BLD MANUAL: 75 %
PLAT MORPH BLD: ABNORMAL
PLATELET # BLD AUTO: 351 10E3/UL (ref 150–450)
POTASSIUM SERPL-SCNC: 3.9 MMOL/L (ref 3.4–5.3)
PROT SERPL-MCNC: 7.5 G/DL (ref 6.4–8.3)
RBC # BLD AUTO: 4.54 10E6/UL (ref 3.8–5.2)
RBC MORPH BLD: ABNORMAL
SODIUM SERPL-SCNC: 140 MMOL/L (ref 135–145)
TSH SERPL DL<=0.005 MIU/L-ACNC: 0.88 UIU/ML (ref 0.3–4.2)
VARIANT LYMPHS BLD QL SMEAR: PRESENT
WBC # BLD AUTO: 11.4 10E3/UL (ref 4–11)

## 2025-01-28 PROCEDURE — 85041 AUTOMATED RBC COUNT: CPT | Mod: ZL | Performed by: NURSE PRACTITIONER

## 2025-01-28 PROCEDURE — 82728 ASSAY OF FERRITIN: CPT | Mod: ZL | Performed by: NURSE PRACTITIONER

## 2025-01-28 PROCEDURE — 85007 BL SMEAR W/DIFF WBC COUNT: CPT | Mod: ZL | Performed by: NURSE PRACTITIONER

## 2025-01-28 PROCEDURE — 84155 ASSAY OF PROTEIN SERUM: CPT | Mod: ZL | Performed by: NURSE PRACTITIONER

## 2025-01-28 PROCEDURE — G0463 HOSPITAL OUTPT CLINIC VISIT: HCPCS

## 2025-01-28 PROCEDURE — 82306 VITAMIN D 25 HYDROXY: CPT | Mod: ZL | Performed by: NURSE PRACTITIONER

## 2025-01-28 PROCEDURE — 84075 ASSAY ALKALINE PHOSPHATASE: CPT | Mod: ZL | Performed by: NURSE PRACTITIONER

## 2025-01-28 PROCEDURE — 36415 COLL VENOUS BLD VENIPUNCTURE: CPT | Mod: ZL | Performed by: NURSE PRACTITIONER

## 2025-01-28 PROCEDURE — 83550 IRON BINDING TEST: CPT | Mod: ZL | Performed by: NURSE PRACTITIONER

## 2025-01-28 PROCEDURE — 84443 ASSAY THYROID STIM HORMONE: CPT | Mod: ZL | Performed by: NURSE PRACTITIONER

## 2025-01-28 PROCEDURE — 81025 URINE PREGNANCY TEST: CPT | Mod: ZL | Performed by: NURSE PRACTITIONER

## 2025-01-28 PROCEDURE — 83540 ASSAY OF IRON: CPT | Mod: ZL | Performed by: NURSE PRACTITIONER

## 2025-01-28 PROCEDURE — 85014 HEMATOCRIT: CPT | Mod: ZL | Performed by: NURSE PRACTITIONER

## 2025-01-28 ASSESSMENT — ANXIETY QUESTIONNAIRES
GAD7 TOTAL SCORE: 3
7. FEELING AFRAID AS IF SOMETHING AWFUL MIGHT HAPPEN: NOT AT ALL
7. FEELING AFRAID AS IF SOMETHING AWFUL MIGHT HAPPEN: NOT AT ALL
6. BECOMING EASILY ANNOYED OR IRRITABLE: NOT AT ALL
IF YOU CHECKED OFF ANY PROBLEMS ON THIS QUESTIONNAIRE, HOW DIFFICULT HAVE THESE PROBLEMS MADE IT FOR YOU TO DO YOUR WORK, TAKE CARE OF THINGS AT HOME, OR GET ALONG WITH OTHER PEOPLE: SOMEWHAT DIFFICULT
4. TROUBLE RELAXING: SEVERAL DAYS
3. WORRYING TOO MUCH ABOUT DIFFERENT THINGS: NOT AT ALL
1. FEELING NERVOUS, ANXIOUS, OR ON EDGE: SEVERAL DAYS
8. IF YOU CHECKED OFF ANY PROBLEMS, HOW DIFFICULT HAVE THESE MADE IT FOR YOU TO DO YOUR WORK, TAKE CARE OF THINGS AT HOME, OR GET ALONG WITH OTHER PEOPLE?: SOMEWHAT DIFFICULT
2. NOT BEING ABLE TO STOP OR CONTROL WORRYING: NOT AT ALL
GAD7 TOTAL SCORE: 3
GAD7 TOTAL SCORE: 3
5. BEING SO RESTLESS THAT IT IS HARD TO SIT STILL: SEVERAL DAYS

## 2025-01-28 ASSESSMENT — PAIN SCALES - PAIN ENJOYMENT GENERAL ACTIVITY SCALE (PEG)
AVG_PAIN_PASTWEEK: 4
INTERFERED_GENERAL_ACTIVITY: 4
AVG_PAIN_PASTWEEK: 4
PEG_TOTALSCORE: 3.67
PEG_TOTALSCORE: 3.67
INTERFERED_ENJOYMENT_LIFE: 3
INTERFERED_ENJOYMENT_LIFE: 3
INTERFERED_GENERAL_ACTIVITY: 4

## 2025-01-28 ASSESSMENT — PAIN SCALES - GENERAL: PAINLEVEL_OUTOF10: MILD PAIN (2)

## 2025-01-28 ASSESSMENT — PATIENT HEALTH QUESTIONNAIRE - PHQ9
SUM OF ALL RESPONSES TO PHQ QUESTIONS 1-9: 8
SUM OF ALL RESPONSES TO PHQ QUESTIONS 1-9: 8
10. IF YOU CHECKED OFF ANY PROBLEMS, HOW DIFFICULT HAVE THESE PROBLEMS MADE IT FOR YOU TO DO YOUR WORK, TAKE CARE OF THINGS AT HOME, OR GET ALONG WITH OTHER PEOPLE: SOMEWHAT DIFFICULT

## 2025-01-28 NOTE — PROGRESS NOTES
"  Assessment & Plan   Problem List Items Addressed This Visit    None  Visit Diagnoses       Dizziness    -  Primary    Relevant Orders    Iron & Iron Binding Capacity (Completed)    TSH Reflex GH (Completed)    CBC and Differential (Completed)    Comprehensive Metabolic Panel (Completed)    Vitamin D Total (Completed)    Pregnancy, Urine (HCG) (Completed)    Ferritin (Completed)    Light-headed feeling        Relevant Orders    Iron & Iron Binding Capacity (Completed)    TSH Reflex GH (Completed)    CBC and Differential (Completed)    Comprehensive Metabolic Panel (Completed)    Vitamin D Total (Completed)    Pregnancy, Urine (HCG) (Completed)    Ferritin (Completed)           We did discuss that symptoms are likely orthostatic hypotension.  She did have a drop in her blood pressure when we checked these in office.  Labs were updated today, these all returned stable.  Encouraged her to work on increased fluid intake, slow position changes and follow-up with symptoms persist.  No indications for cardiac workup at this time but may need to move forward with this if symptoms continue.       No follow-ups on file.      Saravanan Mabry is a 24 year old, presenting for the following health issues:  Dizziness    History of Present Illness       Headaches:   Since the patient's last clinic visit, headaches are: worsened  The patient is getting headaches:  Almost every day  She is able to do normal daily activities when she has a migraine.  The patient is taking the following rescue/relief medications:  No rescue/relief medications   Patient states \"I get no relief\" from the rescue/relief medications.   The patient is taking the following medications to prevent migraines:  No medications to prevent migraines  In the past 4 weeks, the patient has gone to an Urgent Care or Emergency Room 0 times times due to headaches.    Reason for visit:  Dizzy and light headed after standing up too fast  Symptom onset:  3-4 weeks " "ago  Symptoms include:  Dizzy, light headed,splotches in vision  Symptom intensity:  Moderate  Symptom progression:  Worsening  Had these symptoms before:  No  What makes it worse:  Moving too quickly  What makes it better:  Sitting or laying down helps a little   She is taking medications regularly.     She presents to clinic today with 2 to 3 weeks of feeling lightheaded/dizzy when she stands.  She is not taking any medications, has not had any recent illnesses.  She reports she is not on any birth control.  She is sleeping well, does have a toddler at home, sleep is interrupted at times.  She is eating and drinking well.  Occasional heartburn, occasional constipation.  Denies any urinary burning, frequency or urgency.  Menses are irregular, typically last 5 to 6 days, heavy in the first few days.  She cannot think of anything that has changed in her life that would be causing her current symptoms.        Objective    /78   Pulse 72   Temp 99.1  F (37.3  C)   Resp 16   Ht 1.575 m (5' 2\")   Wt 90.6 kg (199 lb 12.8 oz)   LMP 01/04/2025 (Exact Date)   SpO2 98%   BMI 36.54 kg/m    Body mass index is 36.54 kg/m .  Physical Exam   GENERAL: alert and no distress  EYES: Eyes grossly normal to inspection, PERRL and conjunctivae and sclerae normal  HENT: ear canals and TM's normal, nose and mouth without ulcers or lesions  NECK: no adenopathy, no asymmetry, masses, or scars  RESP: lungs clear to auscultation - no rales, rhonchi or wheezes  CV: regular rate and rhythm, normal S1 S2  NEURO: Normal strength and tone, mentation intact and speech normal  PSYCH: mentation appears normal, affect normal/bright    Results for orders placed or performed in visit on 01/28/25   Ferritin     Status: Normal   Result Value Ref Range    Ferritin 25 6 - 175 ng/mL   Pregnancy, Urine (HCG)     Status: Normal   Result Value Ref Range    hCG Urine Qualitative Negative Negative   Vitamin D Total     Status: Abnormal   Result Value " Ref Range    Vitamin D, Total (25-Hydroxy) 14 (L) 20 - 50 ng/mL    Narrative    Season, race, dietary intake, and treatment affect the concentration of 25-hydroxy-Vitamin D. Values may decrease during winter months and increase during summer months.    Vitamin D determination is routinely performed by an immunoassay specific for 25 hydroxyvitamin D3.  If an individual is on vitamin D2(ergocalciferol) supplementation, please specify 25 OH vitamin D2 and D3 level determination by LCMSMS test [id number].     Comprehensive Metabolic Panel     Status: Abnormal   Result Value Ref Range    Sodium 140 135 - 145 mmol/L    Potassium 3.9 3.4 - 5.3 mmol/L    Carbon Dioxide (CO2) 21 (L) 22 - 29 mmol/L    Anion Gap 14 7 - 15 mmol/L    Urea Nitrogen 11.2 6.0 - 20.0 mg/dL    Creatinine 0.76 0.51 - 0.95 mg/dL    GFR Estimate >90 >60 mL/min/1.73m2    Calcium 9.7 8.8 - 10.4 mg/dL    Chloride 105 98 - 107 mmol/L    Glucose 92 70 - 99 mg/dL    Alkaline Phosphatase 55 40 - 150 U/L    AST 14 0 - 45 U/L    ALT 12 0 - 50 U/L    Protein Total 7.5 6.4 - 8.3 g/dL    Albumin 4.6 3.5 - 5.2 g/dL    Bilirubin Total 0.7 <=1.2 mg/dL   TSH Reflex GH     Status: Normal   Result Value Ref Range    TSH 0.88 0.30 - 4.20 uIU/mL   Iron & Iron Binding Capacity     Status: Normal   Result Value Ref Range    Iron 123 37 - 145 ug/dL    Iron Binding Capacity 368 240 - 430 ug/dL    Iron Sat Index 33 15 - 46 %   CBC with platelets and differential     Status: Abnormal   Result Value Ref Range    WBC Count 11.4 (H) 4.0 - 11.0 10e3/uL    RBC Count 4.54 3.80 - 5.20 10e6/uL    Hemoglobin 13.1 11.7 - 15.7 g/dL    Hematocrit 38.8 35.0 - 47.0 %    MCV 86 78 - 100 fL    MCH 28.9 26.5 - 33.0 pg    MCHC 33.8 31.5 - 36.5 g/dL    RDW 14.3 10.0 - 15.0 %    Platelet Count 351 150 - 450 10e3/uL   Manual Differential     Status: Abnormal   Result Value Ref Range    % Neutrophils 75 %    % Lymphocytes 20 %    % Monocytes 2 %    % Eosinophils 3 %    % Basophils 0 %    Absolute  Neutrophils 8.6 (H) 1.6 - 8.3 10e3/uL    Absolute Lymphocytes 2.3 0.8 - 5.3 10e3/uL    Absolute Monocytes 0.2 0.0 - 1.3 10e3/uL    Absolute Eosinophils 0.3 0.0 - 0.7 10e3/uL    Absolute Basophils 0.0 0.0 - 0.2 10e3/uL    RBC Morphology Confirmed RBC Indices     Platelet Assessment  Automated Count Confirmed. Platelet morphology is normal.     Automated Count Confirmed. Platelet morphology is normal.    Reactive Lymphocytes Present (A) None Seen   CBC and Differential     Status: Abnormal    Narrative    The following orders were created for panel order CBC and Differential.  Procedure                               Abnormality         Status                     ---------                               -----------         ------                     CBC with platelets and d...[[id number]]  Abnormal            Final result               RBC and Platelet Morphology[[id number]]                                                 Manual Differential[[id number]]          Abnormal            Final result                 Please view results for these tests on the individual orders.             Signed Electronically by: KRYSTLE Baker CNP

## 2025-01-28 NOTE — NURSING NOTE
Patient presents today for dizziness. Patient reports that her symptoms have worsened since then.    Medication Reconciliation Complete    Shu Melgar LPN  1/28/2025 1:08 PM

## 2025-01-29 LAB — VIT D+METAB SERPL-MCNC: 14 NG/ML (ref 20–50)

## 2025-05-17 ENCOUNTER — HEALTH MAINTENANCE LETTER (OUTPATIENT)
Age: 25
End: 2025-05-17

## 2025-08-22 ENCOUNTER — RESULTS FOLLOW-UP (OUTPATIENT)
Dept: OBGYN | Facility: OTHER | Age: 25
End: 2025-08-22

## 2025-08-22 DIAGNOSIS — B96.89 BACTERIAL VAGINITIS: Primary | ICD-10-CM

## 2025-08-22 DIAGNOSIS — N76.0 BACTERIAL VAGINITIS: Primary | ICD-10-CM

## 2025-08-22 RX ORDER — METRONIDAZOLE 500 MG/1
500 TABLET ORAL 2 TIMES DAILY
Qty: 14 TABLET | Refills: 0 | Status: SHIPPED | OUTPATIENT
Start: 2025-08-22 | End: 2025-08-29

## (undated) RX ORDER — AMOXICILLIN 500 MG/1
CAPSULE ORAL
Status: DISPENSED
Start: 2024-06-17

## (undated) RX ORDER — IPRATROPIUM BROMIDE AND ALBUTEROL SULFATE 2.5; .5 MG/3ML; MG/3ML
SOLUTION RESPIRATORY (INHALATION)
Status: DISPENSED
Start: 2019-09-17

## (undated) RX ORDER — IBUPROFEN 400 MG/1
TABLET, FILM COATED ORAL
Status: DISPENSED
Start: 2024-11-21

## (undated) RX ORDER — ONDANSETRON 4 MG/1
TABLET, ORALLY DISINTEGRATING ORAL
Status: DISPENSED
Start: 2024-06-17

## (undated) RX ORDER — LIDOCAINE HYDROCHLORIDE 20 MG/ML
SOLUTION OROPHARYNGEAL
Status: DISPENSED
Start: 2019-08-25

## (undated) RX ORDER — SODIUM CHLORIDE, SODIUM LACTATE, POTASSIUM CHLORIDE, CALCIUM CHLORIDE 600; 310; 30; 20 MG/100ML; MG/100ML; MG/100ML; MG/100ML
INJECTION, SOLUTION INTRAVENOUS
Status: DISPENSED
Start: 2021-08-25

## (undated) RX ORDER — IBUPROFEN 200 MG
TABLET ORAL
Status: DISPENSED
Start: 2024-11-21

## (undated) RX ORDER — ACETAMINOPHEN 325 MG/1
TABLET ORAL
Status: DISPENSED
Start: 2024-11-21

## (undated) RX ORDER — LIDOCAINE HYDROCHLORIDE 20 MG/ML
SOLUTION OROPHARYNGEAL
Status: DISPENSED
Start: 2023-04-03

## (undated) RX ORDER — FENTANYL CITRATE 50 UG/ML
INJECTION, SOLUTION INTRAMUSCULAR; INTRAVENOUS
Status: DISPENSED
Start: 2021-09-16

## (undated) RX ORDER — ALUMINA, MAGNESIA, AND SIMETHICONE 2400; 2400; 240 MG/30ML; MG/30ML; MG/30ML
SUSPENSION ORAL
Status: DISPENSED
Start: 2019-08-25